# Patient Record
Sex: MALE | Race: WHITE | NOT HISPANIC OR LATINO | Employment: OTHER | URBAN - METROPOLITAN AREA
[De-identification: names, ages, dates, MRNs, and addresses within clinical notes are randomized per-mention and may not be internally consistent; named-entity substitution may affect disease eponyms.]

---

## 2024-10-21 ENCOUNTER — HOSPITAL ENCOUNTER (EMERGENCY)
Facility: HOSPITAL | Age: 54
Discharge: HOME | End: 2024-10-21
Attending: EMERGENCY MEDICINE
Payer: COMMERCIAL

## 2024-10-21 ENCOUNTER — CONSULT (OUTPATIENT)
Dept: GASTROENTEROLOGY | Facility: AMBULARY SURGERY CENTER | Age: 54
End: 2024-10-21
Payer: COMMERCIAL

## 2024-10-21 ENCOUNTER — HOSPITAL ENCOUNTER (INPATIENT)
Facility: HOSPITAL | Age: 54
LOS: 2 days | DRG: 254 | End: 2024-10-23
Attending: EMERGENCY MEDICINE | Admitting: INTERNAL MEDICINE
Payer: COMMERCIAL

## 2024-10-21 ENCOUNTER — APPOINTMENT (EMERGENCY)
Dept: CT IMAGING | Facility: HOSPITAL | Age: 54
DRG: 254 | End: 2024-10-21
Payer: COMMERCIAL

## 2024-10-21 VITALS
HEART RATE: 110 BPM | HEIGHT: 70 IN | OXYGEN SATURATION: 97 % | DIASTOLIC BLOOD PRESSURE: 68 MMHG | WEIGHT: 191.2 LBS | BODY MASS INDEX: 27.37 KG/M2 | SYSTOLIC BLOOD PRESSURE: 130 MMHG

## 2024-10-21 VITALS
DIASTOLIC BLOOD PRESSURE: 104 MMHG | HEART RATE: 106 BPM | RESPIRATION RATE: 18 BRPM | TEMPERATURE: 97.9 F | SYSTOLIC BLOOD PRESSURE: 146 MMHG | OXYGEN SATURATION: 96 %

## 2024-10-21 DIAGNOSIS — R79.89 ELEVATED SERUM CREATININE: ICD-10-CM

## 2024-10-21 DIAGNOSIS — K59.00 CONSTIPATION: Primary | ICD-10-CM

## 2024-10-21 DIAGNOSIS — K59.00 CONSTIPATION: ICD-10-CM

## 2024-10-21 DIAGNOSIS — K59.00 CONSTIPATION, UNSPECIFIED CONSTIPATION TYPE: Primary | ICD-10-CM

## 2024-10-21 DIAGNOSIS — E87.6 HYPOKALEMIA: ICD-10-CM

## 2024-10-21 PROBLEM — R65.10 SIRS (SYSTEMIC INFLAMMATORY RESPONSE SYNDROME) (HCC): Status: ACTIVE | Noted: 2024-10-21

## 2024-10-21 PROBLEM — R94.31 PROLONGED Q-T INTERVAL ON ECG: Status: ACTIVE | Noted: 2024-10-21

## 2024-10-21 PROBLEM — R11.2 NAUSEA AND VOMITING: Status: ACTIVE | Noted: 2024-10-21

## 2024-10-21 LAB
ALBUMIN SERPL BCG-MCNC: 4.9 G/DL (ref 3.5–5)
ALP SERPL-CCNC: 64 U/L (ref 34–104)
ALT SERPL W P-5'-P-CCNC: 13 U/L (ref 7–52)
ANION GAP SERPL CALCULATED.3IONS-SCNC: 14 MMOL/L (ref 4–13)
AST SERPL W P-5'-P-CCNC: 17 U/L (ref 13–39)
BASOPHILS # BLD AUTO: 0.06 THOUSANDS/ΜL (ref 0–0.1)
BASOPHILS NFR BLD AUTO: 0 % (ref 0–1)
BILIRUB SERPL-MCNC: 0.9 MG/DL (ref 0.2–1)
BUN SERPL-MCNC: 21 MG/DL (ref 5–25)
CALCIUM SERPL-MCNC: 9.9 MG/DL (ref 8.4–10.2)
CHLORIDE SERPL-SCNC: 99 MMOL/L (ref 96–108)
CO2 SERPL-SCNC: 26 MMOL/L (ref 21–32)
CREAT SERPL-MCNC: 1.55 MG/DL (ref 0.6–1.3)
EOSINOPHIL # BLD AUTO: 0.03 THOUSAND/ΜL (ref 0–0.61)
EOSINOPHIL NFR BLD AUTO: 0 % (ref 0–6)
ERYTHROCYTE [DISTWIDTH] IN BLOOD BY AUTOMATED COUNT: 12.9 % (ref 11.6–15.1)
GFR SERPL CREATININE-BSD FRML MDRD: 49 ML/MIN/1.73SQ M
GLUCOSE SERPL-MCNC: 128 MG/DL (ref 65–140)
HCT VFR BLD AUTO: 47.9 % (ref 36.5–49.3)
HGB BLD-MCNC: 16.6 G/DL (ref 12–17)
IMM GRANULOCYTES # BLD AUTO: 0.04 THOUSAND/UL (ref 0–0.2)
IMM GRANULOCYTES NFR BLD AUTO: 0 % (ref 0–2)
LYMPHOCYTES # BLD AUTO: 0.96 THOUSANDS/ΜL (ref 0.6–4.47)
LYMPHOCYTES NFR BLD AUTO: 6 % (ref 14–44)
MAGNESIUM SERPL-MCNC: 2.6 MG/DL (ref 1.9–2.7)
MCH RBC QN AUTO: 31 PG (ref 26.8–34.3)
MCHC RBC AUTO-ENTMCNC: 34.7 G/DL (ref 31.4–37.4)
MCV RBC AUTO: 90 FL (ref 82–98)
MONOCYTES # BLD AUTO: 1.04 THOUSAND/ΜL (ref 0.17–1.22)
MONOCYTES NFR BLD AUTO: 7 % (ref 4–12)
NEUTROPHILS # BLD AUTO: 12.79 THOUSANDS/ΜL (ref 1.85–7.62)
NEUTS SEG NFR BLD AUTO: 87 % (ref 43–75)
NRBC BLD AUTO-RTO: 0 /100 WBCS
PLATELET # BLD AUTO: 397 THOUSANDS/UL (ref 149–390)
PMV BLD AUTO: 9.3 FL (ref 8.9–12.7)
POTASSIUM SERPL-SCNC: 3.2 MMOL/L (ref 3.5–5.3)
PROCALCITONIN SERPL-MCNC: <0.05 NG/ML
PROT SERPL-MCNC: 8.4 G/DL (ref 6.4–8.4)
RBC # BLD AUTO: 5.35 MILLION/UL (ref 3.88–5.62)
SODIUM SERPL-SCNC: 139 MMOL/L (ref 135–147)
WBC # BLD AUTO: 14.92 THOUSAND/UL (ref 4.31–10.16)

## 2024-10-21 PROCEDURE — 84145 PROCALCITONIN (PCT): CPT | Performed by: NURSE PRACTITIONER

## 2024-10-21 PROCEDURE — 85025 COMPLETE CBC W/AUTO DIFF WBC: CPT

## 2024-10-21 PROCEDURE — 99283 EMERGENCY DEPT VISIT LOW MDM: CPT

## 2024-10-21 PROCEDURE — 99285 EMERGENCY DEPT VISIT HI MDM: CPT

## 2024-10-21 PROCEDURE — 36415 COLL VENOUS BLD VENIPUNCTURE: CPT

## 2024-10-21 PROCEDURE — 96361 HYDRATE IV INFUSION ADD-ON: CPT

## 2024-10-21 PROCEDURE — 93005 ELECTROCARDIOGRAM TRACING: CPT

## 2024-10-21 PROCEDURE — 96375 TX/PRO/DX INJ NEW DRUG ADDON: CPT

## 2024-10-21 PROCEDURE — 99283 EMERGENCY DEPT VISIT LOW MDM: CPT | Performed by: EMERGENCY MEDICINE

## 2024-10-21 PROCEDURE — 74177 CT ABD & PELVIS W/CONTRAST: CPT

## 2024-10-21 PROCEDURE — 96374 THER/PROPH/DIAG INJ IV PUSH: CPT

## 2024-10-21 PROCEDURE — 99203 OFFICE O/P NEW LOW 30 MIN: CPT | Performed by: PHYSICIAN ASSISTANT

## 2024-10-21 PROCEDURE — 80053 COMPREHEN METABOLIC PANEL: CPT

## 2024-10-21 PROCEDURE — 83735 ASSAY OF MAGNESIUM: CPT | Performed by: NURSE PRACTITIONER

## 2024-10-21 RX ORDER — POLYETHYLENE GLYCOL 3350 17 G/17G
17 POWDER, FOR SOLUTION ORAL DAILY
Qty: 12 EACH | Refills: 0 | Status: ON HOLD | OUTPATIENT
Start: 2024-10-21

## 2024-10-21 RX ORDER — POTASSIUM CHLORIDE 14.9 MG/ML
20 INJECTION INTRAVENOUS
Status: COMPLETED | OUTPATIENT
Start: 2024-10-21 | End: 2024-10-22

## 2024-10-21 RX ORDER — SODIUM CHLORIDE, SODIUM LACTATE, POTASSIUM CHLORIDE, CALCIUM CHLORIDE 600; 310; 30; 20 MG/100ML; MG/100ML; MG/100ML; MG/100ML
100 INJECTION, SOLUTION INTRAVENOUS CONTINUOUS
Status: DISCONTINUED | OUTPATIENT
Start: 2024-10-21 | End: 2024-10-23

## 2024-10-21 RX ORDER — POLYETHYLENE GLYCOL 3350 17 G/17G
17 POWDER, FOR SOLUTION ORAL ONCE
Status: DISCONTINUED | OUTPATIENT
Start: 2024-10-21 | End: 2024-10-21

## 2024-10-21 RX ORDER — FAMOTIDINE 10 MG/ML
20 INJECTION, SOLUTION INTRAVENOUS ONCE
Status: COMPLETED | OUTPATIENT
Start: 2024-10-21 | End: 2024-10-21

## 2024-10-21 RX ORDER — ONDANSETRON 2 MG/ML
4 INJECTION INTRAMUSCULAR; INTRAVENOUS ONCE
Status: COMPLETED | OUTPATIENT
Start: 2024-10-21 | End: 2024-10-21

## 2024-10-21 RX ORDER — BISACODYL 10 MG
10 SUPPOSITORY, RECTAL RECTAL DAILY PRN
Status: DISCONTINUED | OUTPATIENT
Start: 2024-10-21 | End: 2024-10-23 | Stop reason: HOSPADM

## 2024-10-21 RX ADMIN — POLYETHYLENE GLYCOL 3350, SODIUM SULFATE ANHYDROUS, SODIUM BICARBONATE, SODIUM CHLORIDE, POTASSIUM CHLORIDE 4000 ML: 236; 22.74; 6.74; 5.86; 2.97 POWDER, FOR SOLUTION ORAL at 06:04

## 2024-10-21 RX ADMIN — ONDANSETRON 4 MG: 2 INJECTION INTRAMUSCULAR; INTRAVENOUS at 19:47

## 2024-10-21 RX ADMIN — SODIUM CHLORIDE, SODIUM LACTATE, POTASSIUM CHLORIDE, AND CALCIUM CHLORIDE 100 ML/HR: .6; .31; .03; .02 INJECTION, SOLUTION INTRAVENOUS at 22:06

## 2024-10-21 RX ADMIN — SODIUM CHLORIDE 1000 ML: 0.9 INJECTION, SOLUTION INTRAVENOUS at 16:09

## 2024-10-21 RX ADMIN — MINERAL OIL 1 ENEMA: 100 ENEMA RECTAL at 22:30

## 2024-10-21 RX ADMIN — IOHEXOL 100 ML: 350 INJECTION, SOLUTION INTRAVENOUS at 17:12

## 2024-10-21 RX ADMIN — FAMOTIDINE 20 MG: 10 INJECTION INTRAVENOUS at 19:35

## 2024-10-21 RX ADMIN — POTASSIUM CHLORIDE 20 MEQ: 14.9 INJECTION, SOLUTION INTRAVENOUS at 23:21

## 2024-10-21 NOTE — ED ATTENDING ATTESTATION
10/21/2024  I, Kenan Poon DO, saw and evaluated the patient. I have discussed the patient with the resident/non-physician practitioner and agree with the resident's/non-physician practitioner's findings, Plan of Care, and MDM as documented in the resident's/non-physician practitioner's note, except where noted. All available labs and Radiology studies were reviewed.  I was present for key portions of any procedure(s) performed by the resident/non-physician practitioner and I was immediately available to provide assistance.       At this point I agree with the current assessment done in the Emergency Department.  I have conducted an independent evaluation of this patient a history and physical is as follows:    54-year-old male presents for constipation has not had a bowel movement for a week.  Patient has been on the road normally eats very healthy plant-based diet, however has been straining from the and thinks that is what is causing his constipation he has no past surgical history, denies any melena or blood in his stool.  Denies any vomiting.  No other modifying factors or associated symptoms.  Otherwise healthy.  On exam slightly tachycardic and hypertensive but seems nervous.  Nondistended abdomen.  No evidence of peritonitis.  Plan laxative discharge    ED Course         Critical Care Time  Procedures

## 2024-10-21 NOTE — PROGRESS NOTES
Ambulatory Visit  Name: Darin Bullock      : 1970      MRN: [id number]  Encounter Provider: Mitali Herrera PA-C  Encounter Date: 10/21/2024   Encounter department: [facility] [place]    Assessment & Plan  Constipation, unspecified constipation type  Patient went to the emergency room this morning for symptoms.  No labs or imaging performed.  He was sent home with GoLytely which he drank 1/2 around 10 AM.  He subsequently had large-volume emesis around 2 PM which he reports was all prep.  His abdomen is distended and firm in the office though bowel sounds are present.    -Discussed consideration for stat CT/obstruction series as an outpatient versus returning back to the emergency room.  His abdomen is distended and firm and he appears visibly uncomfortable in the office and tachycardic.  Recommend return to ER for evaluation.  He is agreeable  -ADT order placed.  -Recommend awaiting CT results prior to further bowel prep, enemas or manual disimpaction. He will likely require admission for this due to inability to tolerate prep as an outpatient.  -Recommend routine lab work with CBC, CMP and TSH    -He will require colonoscopy in the next few months.  He is aware.  We will await ER/hospital eval       History of Present Illness     Darin Bullock is a 54 y.o. male who presents with no significant past medical history after going to the emergency room this morning for constipation.    Patient reports for constipation with no significant bowel movement for at least 1 week.  He reports prior to this he was having regular bowel movements.  No blood in the stool or abdominal pain.  He reports he has been on the road recently there for eating a lot of fast food.  He then started some protein shakes and Metamucil and reports his constipation began to worsen.  He reports some occasional passage of gas however none significantly.  He tried Dulcolax last night without improvement as  "well.  He reports weight has been stable.    Patient went to the emergency room earlier this morning after no improvement with dulcolax overnight. No labs or imaging were obtained.  Patient was sent DOCUSYSly bowel prep and recommended to complete that.  He drink one half of it at 10 AM and subsequently had large episode of emesis around 2 PM.  He did not have any bowel movements with this.    No prior abdominal surgeries.    No significant GI family history.    No smoking or alcohol use.    No prior colonoscopy      Review of Systems   Constitutional:  Negative for chills and fever.   HENT:  Negative for ear pain and sore throat.    Eyes:  Negative for pain and visual disturbance.   Respiratory:  Negative for cough and shortness of breath.    Cardiovascular:  Negative for chest pain and palpitations.   Gastrointestinal:  Positive for abdominal pain. Negative for vomiting.   Genitourinary:  Negative for dysuria and hematuria.   Musculoskeletal:  Negative for arthralgias and back pain.   Skin:  Negative for color change and rash.   Neurological:  Negative for seizures and syncope.   All other systems reviewed and are negative.          Objective     /68 (BP Location: Left arm, Patient Position: Sitting, Cuff Size: Standard)   Pulse (!) 110   Ht 5' 10\" (1.778 m)   Wt 86.7 kg (191 lb 3.2 oz)   SpO2 97%   BMI 27.43 kg/m²     Physical Exam  Vitals reviewed.   Constitutional:       General: He is not in acute distress.     Appearance: Normal appearance. He is not ill-appearing.   HENT:      Head: Normocephalic and atraumatic.   Eyes:      Conjunctiva/sclera: Conjunctivae normal.   Cardiovascular:      Rate and Rhythm: Normal rate and regular rhythm.      Heart sounds: No murmur heard.  Pulmonary:      Effort: Pulmonary effort is normal. No respiratory distress.      Breath sounds: Normal breath sounds.   Abdominal:      General: Bowel sounds are normal. There is distension.      Tenderness: There is abdominal " tenderness. There is no guarding or rebound.      Comments: Abdomen is mildly distended and firm.  Mild tenderness throughout.  Bowel sounds are present   Musculoskeletal:         General: No swelling.      Cervical back: Normal range of motion.      Right lower leg: No edema.      Left lower leg: No edema.   Skin:     General: Skin is warm.      Coloration: Skin is not jaundiced.   Neurological:      General: No focal deficit present.      Mental Status: He is alert and oriented to person, place, and time. Mental status is at baseline.   Psychiatric:         Mood and Affect: Mood normal.         Behavior: Behavior normal.

## 2024-10-21 NOTE — ED PROVIDER NOTES
Time reflects when diagnosis was documented in both MDM as applicable and the Disposition within this note       Time User Action Codes Description Comment    10/21/2024  5:48 AM Darin Kapoor Add [K59.00] Constipation           ED Disposition       ED Disposition   Discharge    Condition   Stable    Date/Time   Mon Oct 21, 2024  5:48 AM    Comment   Darin BURNS Ara discharge to home/self care.                   Assessment & Plan       Medical Decision Making  Patient is a 54-year-old male presenting for concerns of constipation.  DDx: Constipation  Based on patient presentation and physical exam findings, primary concern for constipation.  Plan for GoLytely administration, MiraLAX prescription, and GI follow-up.  GoLytely provided, patient will plan to take it at home.  Return precautions given.  Patient understands agrees with plan.  Ready for discharge.    Problems Addressed:  Constipation: acute illness or injury    Risk  OTC drugs.  Prescription drug management.             Medications   polyethylene glycol (GOLYTELY) bowel prep 4,000 mL (4,000 mL Oral Given 10/21/24 0604)       ED Risk Strat Scores                           SBIRT 20yo+      Flowsheet Row Most Recent Value   Initial Alcohol Screen: US AUDIT-C     1. How often do you have a drink containing alcohol? 0 Filed at: 10/21/2024 0459   Audit-C Score 0 Filed at: 10/21/2024 0459   CHRISS: How many times in the past year have you...    Used an illegal drug or used a prescription medication for non-medical reasons? Never Filed at: 10/21/2024 0459                            History of Present Illness       Chief Complaint   Patient presents with    Constipation     Pt c/o constipation, states he has not had a bm in one week.  Pt very uncomfortable and bloated.  Pt states he tried colace at home with no relief.        History reviewed. No pertinent past medical history.   History reviewed. No pertinent surgical history.   History reviewed. No pertinent family  history.   Social History     Tobacco Use    Smoking status: Never     Passive exposure: Never    Smokeless tobacco: Never   Substance Use Topics    Alcohol use: Not Currently    Drug use: Never      E-Cigarette/Vaping      E-Cigarette/Vaping Substances      I have reviewed and agree with the history as documented.     HPI  Patient is a 54-year-old male presenting for concerns of constipation has not had a bowel movement for the last week.  No abdominal surgical history, no symptom past medical history.  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  States since that time has had difficulty with a bowel movement.  No fever chills no chest pain.  Has tried Dulcolax at home without relief.  Review of Systems   Constitutional: Negative.    HENT: Negative.     Eyes: Negative.    Respiratory: Negative.     Cardiovascular: Negative.    Gastrointestinal:  Positive for constipation.   Endocrine: Negative.    Genitourinary: Negative.    Musculoskeletal: Negative.    Allergic/Immunologic: Negative.    Neurological: Negative.    Hematological: Negative.    Psychiatric/Behavioral: Negative.             Objective       ED Triage Vitals [10/21/24 0458]   Temperature Pulse Blood Pressure Respirations SpO2 Patient Position - Orthostatic VS   97.9 °F (36.6 °C) (!) 106 (!) 146/104 18 96 % Sitting      Temp Source Heart Rate Source BP Location FiO2 (%) Pain Score    Temporal Monitor Left arm -- --      Vitals      Date and Time Temp Pulse SpO2 Resp BP Pain Score FACES Pain Rating User   10/21/24 0458 97.9 °F (36.6 °C) 106 96 % 18 146/104 -- -- NZ            Physical Exam  Vitals and nursing note reviewed.   Constitutional:       Appearance: Normal appearance. He is normal weight.   HENT:      Head: Normocephalic and atraumatic.      Right Ear: Tympanic membrane, ear canal and external ear normal.      Left Ear: Tympanic membrane, ear canal and external ear normal.      Nose:  Nose normal.      Mouth/Throat:      Mouth: Mucous membranes are moist.      Pharynx: Oropharynx is clear.   Eyes:      Extraocular Movements: Extraocular movements intact.      Conjunctiva/sclera: Conjunctivae normal.      Pupils: Pupils are equal, round, and reactive to light.   Cardiovascular:      Rate and Rhythm: Normal rate and regular rhythm.      Pulses: Normal pulses.      Heart sounds: Normal heart sounds.   Pulmonary:      Effort: Pulmonary effort is normal.      Breath sounds: Normal breath sounds.   Abdominal:      General: Abdomen is flat. Bowel sounds are normal.      Palpations: Abdomen is soft.   Musculoskeletal:         General: Normal range of motion.      Cervical back: Normal range of motion and neck supple.   Skin:     General: Skin is warm and dry.      Capillary Refill: Capillary refill takes less than 2 seconds.   Neurological:      General: No focal deficit present.      Mental Status: He is alert and oriented to person, place, and time.   Psychiatric:         Mood and Affect: Mood normal.         Behavior: Behavior normal.         Thought Content: Thought content normal.         Judgment: Judgment normal.         Results Reviewed       None            No orders to display       Procedures    ED Medication and Procedure Management   None     Discharge Medication List as of 10/21/2024  6:03 AM        START taking these medications    Details   polyethylene glycol (MIRALAX) 17 g packet Take 17 g by mouth daily, Starting Mon 10/21/2024, Normal             ED SEPSIS DOCUMENTATION   Time reflects when diagnosis was documented in both MDM as applicable and the Disposition within this note       Time User Action Codes Description Comment    10/21/2024  5:48 AM Darin Kapoor Add [K59.00] Constipation                  Darin Kapoor MD  10/21/24 0708

## 2024-10-21 NOTE — Clinical Note
Case was discussed with SHILOH and the patient's admission status was agreed to be Admission Status: observation status to the service of Dr. Mendoza .

## 2024-10-21 NOTE — ED PROVIDER NOTES
Time reflects when diagnosis was documented in both MDM as applicable and the Disposition within this note       Time User Action Codes Description Comment    10/21/2024  7:11 PM Cherie Donald [K59.00] Constipation     10/21/2024  7:11 PM Cherie Donald [E87.6] Hypokalemia     10/21/2024  8:24 PM Cherie Donald [R79.89] Elevated serum creatinine           ED Disposition       ED Disposition   Admit    Condition   Stable    Date/Time   Mon Oct 21, 2024  8:51 PM    Comment   Case was discussed with SHILOH and the patient's admission status was agreed to be Admission Status: inpatient status to the service of Dr. Mendoza .               Assessment & Plan       Medical Decision Making  Differential diagnosis to include but not limited to: bowel obstruction, bowel perforation, constipation  CBC with leukocytosis, CMP with hypokalemia and elevated creatinine and anion gap. CT abdomen/pelvis with severe constipation. Distended fluid-filled loops of small bowel likely secondary to constipation however enteritis and/or low-grade obstruction.  Discussed case with colorectal on call, Dr. Garcia for evaluation due to high suspicion for obstruction. Patient vomiting again prior to evaluation, Zofran ordered. Discussed case with SHILOH Mendoza and Miki Perdomo for admission. Patient accepted. Pt stable at time of admission, vital signs reviewed, questions answered.     Problems Addressed:  Constipation: acute illness or injury  Hypokalemia: acute illness or injury    Amount and/or Complexity of Data Reviewed  External Data Reviewed: notes.     Details: Reviewed patient's ED visit earlier today and his GI appointment.  Labs: ordered. Decision-making details documented in ED Course.  Radiology: ordered. Decision-making details documented in ED Course.  Discussion of management or test interpretation with external provider(s): Reviewed case with Dr. Garcia from colorectal surgery due to high suspicion for  obstruction. Their plan is medicine admit, recommend suppository and enemas, monitoring abdominal exam, if does not resolve in the next 48 hours would engage GI. Will need follow-up colonoscopy.   Discussed case with [facility] provider iMki Perdomo who accepted patient for admission.    Risk  Prescription drug management.  Decision regarding hospitalization.        ED Course as of 10/21/24 2052   Mon Oct 21, 2024   1634 WBC(!): 14.92   1705 Potassium(!): 3.2   1705 ANION GAP(!): 14   1705 Creatinine(!): 1.55   1903 CT abdomen pelvis with contrast  Severe constipation. Distended fluid-filled loops of small bowel likely secondary to constipation however enteritis and/or low-grade obstruction not excluded       Medications   sodium chloride 0.9 % bolus 1,000 mL (0 mL Intravenous Stopped 10/21/24 1829)   iohexol (OMNIPAQUE) 350 MG/ML injection (MULTI-DOSE) 100 mL (100 mL Intravenous Given 10/21/24 1712)   Famotidine (PF) (PEPCID) injection 20 mg (20 mg Intravenous Given 10/21/24 1935)   ondansetron (ZOFRAN) injection 4 mg (4 mg Intravenous Given 10/21/24 1947)       ED Risk Strat Scores                                               History of Present Illness       Chief Complaint   Patient presents with    Constipation     Pt reports he has constipation was seen at [facility] this am and given go-lyte with no results but then vomited it up. Pt was seen at gastro and told he should have imaging.        History reviewed. No pertinent past medical history.   History reviewed. No pertinent surgical history.   History reviewed. No pertinent family history.   Social History     Tobacco Use    Smoking status: Never     Passive exposure: Never    Smokeless tobacco: Never   Substance Use Topics    Alcohol use: Not Currently    Drug use: Never      E-Cigarette/Vaping      E-Cigarette/Vaping Substances      I have reviewed and agree with the history as documented.     Darin is a 54 year old male presenting to the ED for constipation x  one week. 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 In the beginning of October (this month) he began eating healthy again, eating Core Powder shakes for breakfast and taking Metamucil. He stopped taking Metamucil 1.5-2 weeks ago. He noted constipation developing about two weeks ago, his last bowel movement was eight days ago with passage of small stool pellets five days ago. Nothing since. He went to [facility] to get Ducolax, the pharmacist recommended ER evaluation if no BM this morning. Was evaluated in [place] and given a new medication for constipation. Also made a GI appointment for this afternoon. Drank 56 oz of the medication and vomited it up at 1330 before the GI doctor. GI dr recommended ED visit for imaging and possible admission. States he has lack of appetite and feels his abdomen is distended. Able to tolerate water. No further symptoms. No hx of constipation or abdominal surgeries.         Review of Systems   Constitutional:  Positive for appetite change. Negative for chills and fever.   Eyes:  Negative for photophobia and visual disturbance.   Respiratory:  Negative for cough and shortness of breath.    Cardiovascular:  Negative for chest pain and palpitations.   Gastrointestinal:  Positive for abdominal pain and constipation. Negative for diarrhea, nausea and vomiting.   Genitourinary:  Negative for decreased urine volume, difficulty urinating, dysuria, frequency, hematuria and urgency.   Musculoskeletal:  Negative for myalgias, neck pain and neck stiffness.   Skin:  Negative for rash.   Neurological:  Negative for light-headedness and headaches.           Objective       ED Triage Vitals   Temperature Pulse Blood Pressure Respirations SpO2 Patient Position - Orthostatic VS   10/21/24 1508 10/21/24 1507 10/21/24 1507 10/21/24 1507 10/21/24 1507 10/21/24 1507   (!) 97.4 °F (36.3 °C) 100 142/87 18  95 % Sitting      Temp Source Heart Rate Source BP Location FiO2 (%) Pain Score    10/21/24 1508 10/21/24 1830 10/21/24 1507 -- 10/21/24 1939    Oral Monitor Right arm  No Pain      Vitals      Date and Time Temp Pulse SpO2 Resp BP Pain Score FACES Pain Rating User   10/21/24 1939 -- 96 94 % 18 158/95 No Pain -- GELY   10/21/24 1900 -- 98 95 % 18 150/100 -- -- MD   10/21/24 1830 -- 98 95 % 18 179/107 -- -- KK   10/21/24 1600 -- 98 95 % 18 153/91 -- -- MD   10/21/24 1545 -- 98 95 % 18 158/90 -- -- MD   10/21/24 1508 97.4 °F (36.3 °C) -- -- -- -- -- -- RLN   10/21/24 1507 -- 100 95 % 18 142/87 -- -- RLN            Physical Exam  Vitals reviewed.   Constitutional:       General: He is not in acute distress.     Appearance: Normal appearance. He is not ill-appearing or toxic-appearing.   HENT:      Head: Normocephalic and atraumatic.      Right Ear: External ear normal.      Left Ear: External ear normal.      Nose: Nose normal.   Eyes:      General: No scleral icterus.        Right eye: No discharge.         Left eye: No discharge.      Extraocular Movements: Extraocular movements intact.      Conjunctiva/sclera: Conjunctivae normal.   Cardiovascular:      Rate and Rhythm: Normal rate and regular rhythm.      Pulses: Normal pulses.      Heart sounds: Normal heart sounds.   Pulmonary:      Effort: Pulmonary effort is normal. No respiratory distress.      Breath sounds: Normal breath sounds.   Abdominal:      General: There is distension.      Tenderness: There is abdominal tenderness (generalized). There is no guarding or rebound.      Comments: Firm, distended abdomen.   Musculoskeletal:         General: Normal range of motion.      Cervical back: Normal range of motion and neck supple. No rigidity or tenderness.   Lymphadenopathy:      Cervical: No cervical adenopathy.   Skin:     General: Skin is warm and dry.      Capillary Refill: Capillary refill takes less than 2 seconds.   Neurological:      Mental Status: He is  alert.   Psychiatric:         Mood and Affect: Mood normal.         Behavior: Behavior normal.         Results Reviewed       Procedure Component Value Units Date/Time    Comprehensive metabolic panel [[id number]]  (Abnormal) Collected: 10/21/24 1610    Lab Status: Final result Specimen: Blood from Arm, Right Updated: 10/21/24 1645     Sodium 139 mmol/L      Potassium 3.2 mmol/L      Chloride 99 mmol/L      CO2 26 mmol/L      ANION GAP 14 mmol/L      BUN 21 mg/dL      Creatinine 1.55 mg/dL      Glucose 128 mg/dL      Calcium 9.9 mg/dL      AST 17 U/L      ALT 13 U/L      Alkaline Phosphatase 64 U/L      Total Protein 8.4 g/dL      Albumin 4.9 g/dL      Total Bilirubin 0.90 mg/dL      eGFR 49 ml/min/1.73sq m     Narrative:      National Kidney Disease Foundation guidelines for Chronic Kidney Disease (CKD):     Stage 1 with normal or high GFR (GFR > 90 mL/min/1.73 square meters)    Stage 2 Mild CKD (GFR = 60-89 mL/min/1.73 square meters)    Stage 3A Moderate CKD (GFR = 45-59 mL/min/1.73 square meters)    Stage 3B Moderate CKD (GFR = 30-44 mL/min/1.73 square meters)    Stage 4 Severe CKD (GFR = 15-29 mL/min/1.73 square meters)    Stage 5 End Stage CKD (GFR <15 mL/min/1.73 square meters)  Note: GFR calculation is accurate only with a steady state creatinine    CBC and differential [[id number]]  (Abnormal) Collected: 10/21/24 1610    Lab Status: Final result Specimen: Blood from Arm, Right Updated: 10/21/24 1620     WBC 14.92 Thousand/uL      RBC 5.35 Million/uL      Hemoglobin 16.6 g/dL      Hematocrit 47.9 %      MCV 90 fL      MCH 31.0 pg      MCHC 34.7 g/dL      RDW 12.9 %      MPV 9.3 fL      Platelets 397 Thousands/uL      nRBC 0 /100 WBCs      Segmented % 87 %      Immature Grans % 0 %      Lymphocytes % 6 %      Monocytes % 7 %      Eosinophils Relative 0 %      Basophils Relative 0 %      Absolute Neutrophils 12.79 Thousands/µL      Absolute Immature Grans 0.04 Thousand/uL      Absolute Lymphocytes 0.96  Thousands/µL      Absolute Monocytes 1.04 Thousand/µL      Eosinophils Absolute 0.03 Thousand/µL      Basophils Absolute 0.06 Thousands/µL             CT abdomen pelvis with contrast   Final Interpretation by Louis Solis MD (10/21 1900)      Severe constipation. Distended fluid-filled loops of small bowel likely secondary to constipation however enteritis and/or low-grade obstruction not excluded         Workstation performed: [id number]             Procedures    ED Medication and Procedure Management   Prior to Admission Medications   Prescriptions Last Dose Informant Patient Reported? Taking?   polyethylene glycol (MIRALAX) 17 g packet  Self No No   Sig: Take 17 g by mouth daily      Facility-Administered Medications: None     Patient's Medications   Discharge Prescriptions    No medications on file     No discharge procedures on file.  ED SEPSIS DOCUMENTATION   Time reflects when diagnosis was documented in both MDM as applicable and the Disposition within this note       Time User Action Codes Description Comment    10/21/2024  7:11 PM Cherie Donald [K59.00] Constipation     10/21/2024  7:11 PM Cherie Donald [E87.6] Hypokalemia     10/21/2024  8:24 PM Cherie Donald [R79.89] Elevated serum creatinine            Initial Sepsis Screening       Row Name 10/21/24 1634                Is the patient's history suggestive of a new or worsening infection? No  -AB                  User Key  (r) = Recorded By, (t) = Taken By, (c) = Cosigned By      Initials Name Provider Type    AB Cherie Donald PA-C Physician Assistant                       Cherie Donald PA-C  10/21/24 2052

## 2024-10-22 LAB
ANION GAP SERPL CALCULATED.3IONS-SCNC: 15 MMOL/L (ref 4–13)
ATRIAL RATE: 88 BPM
ATRIAL RATE: 98 BPM
B-OH-BUTYR SERPL-MCNC: 1.64 MMOL/L (ref 0.02–0.27)
BACTERIA UR QL AUTO: ABNORMAL /HPF
BASE EX.OXY STD BLDV CALC-SCNC: 88.6 % (ref 60–80)
BASE EXCESS BLDV CALC-SCNC: -2.3 MMOL/L
BASOPHILS # BLD AUTO: 0.02 THOUSANDS/ΜL (ref 0–0.1)
BASOPHILS NFR BLD AUTO: 0 % (ref 0–1)
BILIRUB UR QL STRIP: NEGATIVE
BUN SERPL-MCNC: 25 MG/DL (ref 5–25)
CALCIUM SERPL-MCNC: 8.7 MG/DL (ref 8.4–10.2)
CHLORIDE SERPL-SCNC: 105 MMOL/L (ref 96–108)
CLARITY UR: CLEAR
CO2 SERPL-SCNC: 18 MMOL/L (ref 21–32)
COLOR UR: YELLOW
CREAT SERPL-MCNC: 1.31 MG/DL (ref 0.6–1.3)
EOSINOPHIL # BLD AUTO: 0 THOUSAND/ΜL (ref 0–0.61)
EOSINOPHIL NFR BLD AUTO: 0 % (ref 0–6)
ERYTHROCYTE [DISTWIDTH] IN BLOOD BY AUTOMATED COUNT: 12.8 % (ref 11.6–15.1)
GFR SERPL CREATININE-BSD FRML MDRD: 60 ML/MIN/1.73SQ M
GLUCOSE SERPL-MCNC: 122 MG/DL (ref 65–140)
GLUCOSE UR STRIP-MCNC: NEGATIVE MG/DL
HCO3 BLDV-SCNC: 22.1 MMOL/L (ref 24–30)
HCT VFR BLD AUTO: 44 % (ref 36.5–49.3)
HGB BLD-MCNC: 14.9 G/DL (ref 12–17)
HGB UR QL STRIP.AUTO: NEGATIVE
IMM GRANULOCYTES # BLD AUTO: 0.04 THOUSAND/UL (ref 0–0.2)
IMM GRANULOCYTES NFR BLD AUTO: 0 % (ref 0–2)
KETONES UR STRIP-MCNC: ABNORMAL MG/DL
LACTATE SERPL-SCNC: 1.1 MMOL/L (ref 0.5–2)
LEUKOCYTE ESTERASE UR QL STRIP: NEGATIVE
LYMPHOCYTES # BLD AUTO: 0.95 THOUSANDS/ΜL (ref 0.6–4.47)
LYMPHOCYTES NFR BLD AUTO: 9 % (ref 14–44)
MAGNESIUM SERPL-MCNC: 2.4 MG/DL (ref 1.9–2.7)
MCH RBC QN AUTO: 30.7 PG (ref 26.8–34.3)
MCHC RBC AUTO-ENTMCNC: 33.9 G/DL (ref 31.4–37.4)
MCV RBC AUTO: 91 FL (ref 82–98)
MONOCYTES # BLD AUTO: 1.24 THOUSAND/ΜL (ref 0.17–1.22)
MONOCYTES NFR BLD AUTO: 12 % (ref 4–12)
MUCOUS THREADS UR QL AUTO: ABNORMAL
NEUTROPHILS # BLD AUTO: 8.34 THOUSANDS/ΜL (ref 1.85–7.62)
NEUTS SEG NFR BLD AUTO: 79 % (ref 43–75)
NITRITE UR QL STRIP: NEGATIVE
NON-SQ EPI CELLS URNS QL MICRO: ABNORMAL /HPF
NRBC BLD AUTO-RTO: 0 /100 WBCS
O2 CT BLDV-SCNC: 19.3 ML/DL
P AXIS: 15 DEGREES
P AXIS: 30 DEGREES
PCO2 BLDV: 37.5 MM HG (ref 42–50)
PH BLDV: 7.39 [PH] (ref 7.3–7.4)
PH UR STRIP.AUTO: 5.5 [PH]
PLATELET # BLD AUTO: 347 THOUSANDS/UL (ref 149–390)
PMV BLD AUTO: 9.2 FL (ref 8.9–12.7)
PO2 BLDV: 60.9 MM HG (ref 35–45)
POTASSIUM SERPL-SCNC: 3.5 MMOL/L (ref 3.5–5.3)
PR INTERVAL: 166 MS
PR INTERVAL: 166 MS
PROCALCITONIN SERPL-MCNC: 0.07 NG/ML
PROT UR STRIP-MCNC: ABNORMAL MG/DL
QRS AXIS: -25 DEGREES
QRS AXIS: -28 DEGREES
QRSD INTERVAL: 86 MS
QRSD INTERVAL: 96 MS
QT INTERVAL: 418 MS
QT INTERVAL: 548 MS
QTC INTERVAL: 505 MS
QTC INTERVAL: 699 MS
RBC # BLD AUTO: 4.85 MILLION/UL (ref 3.88–5.62)
RBC #/AREA URNS AUTO: ABNORMAL /HPF
SODIUM SERPL-SCNC: 138 MMOL/L (ref 135–147)
SP GR UR STRIP.AUTO: >=1.05 (ref 1–1.03)
T WAVE AXIS: -12 DEGREES
T WAVE AXIS: -34 DEGREES
TSH SERPL DL<=0.05 MIU/L-ACNC: 0.76 UIU/ML (ref 0.45–4.5)
UROBILINOGEN UR STRIP-ACNC: <2 MG/DL
VENTRICULAR RATE: 88 BPM
VENTRICULAR RATE: 98 BPM
WBC # BLD AUTO: 10.59 THOUSAND/UL (ref 4.31–10.16)
WBC #/AREA URNS AUTO: ABNORMAL /HPF

## 2024-10-22 PROCEDURE — 93010 ELECTROCARDIOGRAM REPORT: CPT | Performed by: INTERNAL MEDICINE

## 2024-10-22 PROCEDURE — 83605 ASSAY OF LACTIC ACID: CPT

## 2024-10-22 PROCEDURE — 82805 BLOOD GASES W/O2 SATURATION: CPT

## 2024-10-22 PROCEDURE — 82010 KETONE BODYS QUAN: CPT

## 2024-10-22 PROCEDURE — 99232 SBSQ HOSP IP/OBS MODERATE 35: CPT | Performed by: INTERNAL MEDICINE

## 2024-10-22 PROCEDURE — 83735 ASSAY OF MAGNESIUM: CPT

## 2024-10-22 PROCEDURE — 93005 ELECTROCARDIOGRAM TRACING: CPT

## 2024-10-22 PROCEDURE — 80048 BASIC METABOLIC PNL TOTAL CA: CPT | Performed by: NURSE PRACTITIONER

## 2024-10-22 PROCEDURE — 84145 PROCALCITONIN (PCT): CPT | Performed by: NURSE PRACTITIONER

## 2024-10-22 PROCEDURE — 84443 ASSAY THYROID STIM HORMONE: CPT

## 2024-10-22 PROCEDURE — 85025 COMPLETE CBC W/AUTO DIFF WBC: CPT | Performed by: NURSE PRACTITIONER

## 2024-10-22 PROCEDURE — 81001 URINALYSIS AUTO W/SCOPE: CPT | Performed by: NURSE PRACTITIONER

## 2024-10-22 PROCEDURE — NC001 PR NO CHARGE: Performed by: COLON & RECTAL SURGERY

## 2024-10-22 PROCEDURE — 99254 IP/OBS CNSLTJ NEW/EST MOD 60: CPT | Performed by: COLON & RECTAL SURGERY

## 2024-10-22 RX ORDER — PANTOPRAZOLE SODIUM 40 MG/1
40 TABLET, DELAYED RELEASE ORAL
Status: DISCONTINUED | OUTPATIENT
Start: 2024-10-22 | End: 2024-10-23 | Stop reason: HOSPADM

## 2024-10-22 RX ORDER — MAGNESIUM HYDROXIDE/ALUMINUM HYDROXICE/SIMETHICONE 120; 1200; 1200 MG/30ML; MG/30ML; MG/30ML
30 SUSPENSION ORAL EVERY 4 HOURS PRN
Status: DISCONTINUED | OUTPATIENT
Start: 2024-10-22 | End: 2024-10-23 | Stop reason: HOSPADM

## 2024-10-22 RX ORDER — HYDRALAZINE HYDROCHLORIDE 20 MG/ML
10 INJECTION INTRAMUSCULAR; INTRAVENOUS EVERY 8 HOURS PRN
Status: DISCONTINUED | OUTPATIENT
Start: 2024-10-22 | End: 2024-10-23 | Stop reason: HOSPADM

## 2024-10-22 RX ADMIN — MINERAL OIL 1 ENEMA: 100 ENEMA RECTAL at 09:24

## 2024-10-22 RX ADMIN — BISACODYL 10 MG: 10 SUPPOSITORY RECTAL at 21:24

## 2024-10-22 RX ADMIN — SODIUM CHLORIDE, SODIUM LACTATE, POTASSIUM CHLORIDE, AND CALCIUM CHLORIDE 100 ML/HR: .6; .31; .03; .02 INJECTION, SOLUTION INTRAVENOUS at 23:52

## 2024-10-22 RX ADMIN — ALUMINUM HYDROXIDE, MAGNESIUM HYDROXIDE, AND DIMETHICONE 30 ML: 200; 20; 200 SUSPENSION ORAL at 18:48

## 2024-10-22 RX ADMIN — PANTOPRAZOLE SODIUM 40 MG: 40 TABLET, DELAYED RELEASE ORAL at 10:58

## 2024-10-22 RX ADMIN — SODIUM CHLORIDE, SODIUM LACTATE, POTASSIUM CHLORIDE, AND CALCIUM CHLORIDE 100 ML/HR: .6; .31; .03; .02 INJECTION, SOLUTION INTRAVENOUS at 14:28

## 2024-10-22 RX ADMIN — POTASSIUM CHLORIDE 20 MEQ: 14.9 INJECTION, SOLUTION INTRAVENOUS at 01:43

## 2024-10-22 RX ADMIN — BISACODYL 10 MG: 10 SUPPOSITORY RECTAL at 03:55

## 2024-10-22 NOTE — ASSESSMENT & PLAN NOTE
Patient denies fever, chills, cough, shortness of breath, chest pain, dysuria.SIRS criteria: Leukocytosis, tachycardia  Suspect reactive due to recent nausea/vomiting 2/2 constipation  Monitor off antibiotics.  Monitor vital signs, laboratory studies.  Lactic acid unremarkable  Beta-hydroxybutyrate elevated to 1.64

## 2024-10-22 NOTE — PLAN OF CARE
Problem: PAIN - ADULT  Goal: Verbalizes/displays adequate comfort level or baseline comfort level  Description: Interventions:  - Encourage patient to monitor pain and request assistance  - Assess pain using appropriate pain scale  - Administer analgesics based on type and severity of pain and evaluate response  - Implement non-pharmacological measures as appropriate and evaluate response  - Consider cultural and social influences on pain and pain management  - Notify physician/advanced practitioner if interventions unsuccessful or patient reports new pain  Outcome: Progressing     Problem: INFECTION - ADULT  Goal: Absence or prevention of progression during hospitalization  Description: INTERVENTIONS:  - Assess and monitor for signs and symptoms of infection  - Monitor lab/diagnostic results  - Monitor all insertion sites, i.e. indwelling lines, tubes, and drains  - Monitor endotracheal if appropriate and nasal secretions for changes in amount and color  - [place] appropriate cooling/warming therapies per order  - Administer medications as ordered  - Instruct and encourage patient and family to use good hand hygiene technique  - Identify and instruct in appropriate isolation precautions for identified infection/condition  Outcome: Progressing  Goal: Absence of fever/infection during neutropenic period  Description: INTERVENTIONS:  - Monitor WBC    Outcome: Progressing     Problem: SAFETY ADULT  Goal: Patient will remain free of falls  Description: INTERVENTIONS:  - Educate patient/family on patient safety including physical limitations  - Instruct patient to call for assistance with activity   - Consult OT/PT to assist with strengthening/mobility   - Keep Call bell within reach  - Keep bed low and locked with side rails adjusted as appropriate  - Keep care items and personal belongings within reach  - Initiate and maintain comfort rounds  - Make Fall Risk Sign visible to staff  - Offer Toileting every  Hours,  in advance of need  - Initiate/Maintain alarm  - Obtain necessary fall risk management equipment:   - Apply yellow socks and bracelet for high fall risk patients  - Consider moving patient to room near nurses station  Outcome: Progressing  Goal: Maintain or return to baseline ADL function  Description: INTERVENTIONS:  -  Assess patient's ability to carry out ADLs; assess patient's baseline for ADL function and identify physical deficits which impact ability to perform ADLs (bathing, care of mouth/teeth, toileting, grooming, dressing, etc.)  - Assess/evaluate cause of self-care deficits   - Assess range of motion  - Assess patient's mobility; develop plan if impaired  - Assess patient's need for assistive devices and provide as appropriate  - Encourage maximum independence but intervene and supervise when necessary  - Involve family in performance of ADLs  - Assess for home care needs following discharge   - Consider OT consult to assist with ADL evaluation and planning for discharge  - Provide patient education as appropriate  Outcome: Progressing  Goal: Maintains/Returns to pre admission functional level  Description: INTERVENTIONS:  - Perform AM-PAC 6 Click Basic Mobility/ Daily Activity assessment daily.  - Set and communicate daily mobility goal to care team and patient/family/caregiver.   - Collaborate with rehabilitation services on mobility goals if consulted  - Perform Range of Motion  times a day.  - Reposition patient every  hours.  - Dangle patient  times a day  - Stand patient  times a day  - Ambulate patient  times a day  - Out of bed to chair  times a day   - Out of bed for meals  times a day  - Out of bed for toileting  - Record patient progress and toleration of activity level   Outcome: Progressing     Problem: DISCHARGE PLANNING  Goal: Discharge to home or other facility with appropriate resources  Description: INTERVENTIONS:  - Identify barriers to discharge w/patient and caregiver  - Arrange for  needed discharge resources and transportation as appropriate  - Identify discharge learning needs (meds, wound care, etc.)  - Arrange for interpretive services to assist at discharge as needed  - Refer to Case Management Department for coordinating discharge planning if the patient needs post-hospital services based on physician/advanced practitioner order or complex needs related to functional status, cognitive ability, or social support system  Outcome: Progressing     Problem: Knowledge Deficit  Goal: Patient/family/caregiver demonstrates understanding of disease process, treatment plan, medications, and discharge instructions  Description: Complete learning assessment and assess knowledge base.  Interventions:  - Provide teaching at level of understanding  - Provide teaching via preferred learning methods  Outcome: Progressing

## 2024-10-22 NOTE — PROGRESS NOTES
"Progress Note - Hospitalist   Name: Darin Bullock 54 y.o. male I MRN: [id number]  Unit/Bed#: W -01 I Date of Admission: 10/21/2024   Date of Service: 10/22/2024 I Hospital Day: 1    Assessment & Plan  Constipation  Presentation: Patient presents to the ED for the second time today due to complaints of constipation for 1 week. Patient was seen in [facility] ED this morning at which time he was discharged home with GoLytely, Miralax prescription and GI outpatient follow up. He returned to [facility] ED due to new onset of nausea and vomiting following consumption 58 ounces of GoLytely. Patient denies ever undergoing a colonoscopy in the past.  CT A/P: \"Severe constipation. Distended fluid-filled loops of small bowel likely secondary to constipation however enteritis and/or low-grade obstruction not excluded \"  Colorectal surgery consulted  Monitor abdominal exam.  Suppositories and enemas.  Clear liquids as tolerated.  If no return of bowel function in 48 hours, recommending GI consult.  Stool record at bedside.  SIRS (systemic inflammatory response syndrome) (HCC)  Patient denies fever, chills, cough, shortness of breath, chest pain, dysuria.SIRS criteria: Leukocytosis, tachycardia  Suspect reactive due to recent nausea/vomiting 2/2 constipation  Monitor off antibiotics.  Monitor vital signs, laboratory studies.  Lactic acid unremarkable  Beta-hydroxybutyrate elevated to 1.64  Elevated serum creatinine  Creatinine upon admission: 1.55  Baseline: Unsure of baseline creatinine due to limited lab records  IV hydration.  Monitor BMP.  Nausea and vomiting  Suspect 2/2 constipation.  IM Tigan.  IV hydration.  Clear liquids as tolerated.  Prolonged Q-T interval on ECG  Patient denies chest pain.  EKG displayed NSR, nonspecific T wave abnormality, heart rate 98, QTc 699.  Replete electrolytes.  Repeat EKG demonstrated Qtc prolonged at 505, but improved from 699 on admission.    VTE Pharmacologic Prophylaxis: VTE Score: 2 Low Risk " (Score 0-2) - Encourage Ambulation.    Mobility:   Basic Mobility Inpatient Raw Score: 24  JH-HLM Goal: 8: Walk 250 feet or more  JH-HLM Achieved: 8: Walk 250 feet ot more  JH-HLM Goal achieved. Continue to encourage appropriate mobility.    Patient Centered Rounds: I performed bedside rounds with nursing staff today.   Discussions with Specialists or Other Care Team Provider: Yes    Education and Discussions with Family / Patient: Patient declined call to .     Current Length of Stay: 1 day(s)  Current Patient Status: Inpatient   Certification Statement: The patient will continue to require additional inpatient hospital stay due to severe constipation and dilated bowel on CT  Discharge Plan: Anticipate discharge in 48-72 hrs to home.    Code Status: Level 1 - Full Code    Subjective   Patient seen and passed some flatus after enema last evening.  Around noon patient had very small bowel movement, not feeling bloated.  Patient had 1 episode of vomiting after GoLytely PTA, denies nausea since.  Patient has no surgical history and denies family history of colon polyps or mass.  He denies diverticulitis history.  He denies fever/chills, nausea/vomiting, cough, shortness of breath, chest pain, abdominal pain, changes in bladder habits.     Objective :  Temp:  [97.6 °F (36.4 °C)-98 °F (36.7 °C)] 97.8 °F (36.6 °C)  HR:  [] 90  BP: (150-179)/() 153/100  Resp:  [16-18] 18  SpO2:  [93 %-96 %] 94 %  O2 Device: None (Room air)    There is no height or weight on file to calculate BMI.     Input and Output Summary (last 24 hours):     Intake/Output Summary (Last 24 hours) at 10/22/2024 1522  Last data filed at 10/22/2024 1300  Gross per 24 hour   Intake 1960 ml   Output 400 ml   Net 1560 ml       Physical Exam  Constitutional:       General: He is not in acute distress.     Appearance: He is not ill-appearing.   Cardiovascular:      Rate and Rhythm: Normal rate.      Heart sounds: Normal heart sounds.  No murmur heard.  Pulmonary:      Effort: No respiratory distress.      Breath sounds: Normal breath sounds. No wheezing or rales.   Abdominal:      General: Bowel sounds are normal. There is no distension.      Tenderness: There is no abdominal tenderness. There is no guarding or rebound.   Musculoskeletal:      Right lower leg: No edema.      Left lower leg: No edema.           Lines/Drains:              Lab Results: I have reviewed the following results:   Results from last 7 days   Lab Units 10/22/24  0510   WBC Thousand/uL 10.59*   HEMOGLOBIN g/dL 14.9   HEMATOCRIT % 44.0   PLATELETS Thousands/uL 347   SEGS PCT % 79*   LYMPHO PCT % 9*   MONO PCT % 12   EOS PCT % 0     Results from last 7 days   Lab Units 10/22/24  0510 10/21/24  1610   SODIUM mmol/L 138 139   POTASSIUM mmol/L 3.5 3.2*   CHLORIDE mmol/L 105 99   CO2 mmol/L 18* 26   BUN mg/dL 25 21   CREATININE mg/dL 1.31* 1.55*   ANION GAP mmol/L 15* 14*   CALCIUM mg/dL 8.7 9.9   ALBUMIN g/dL  --  4.9   TOTAL BILIRUBIN mg/dL  --  0.90   ALK PHOS U/L  --  64   ALT U/L  --  13   AST U/L  --  17   GLUCOSE RANDOM mg/dL 122 128                 Results from last 7 days   Lab Units 10/22/24  0715 10/22/24  0510 10/21/24  1610   LACTIC ACID mmol/L 1.1  --   --    PROCALCITONIN ng/ml  --  0.07 <0.05       Recent Cultures (last 7 days):         Imaging Results Review: I reviewed radiology reports from this admission including: CTAP with contrast.  Other Study Results Review: EKG was reviewed.     Last 24 Hours Medication List:     Current Facility-Administered Medications:     bisacodyl (DULCOLAX) rectal suppository 10 mg, Daily PRN    hydrALAZINE (APRESOLINE) injection 10 mg, Q8H PRN    lactated ringers infusion, Continuous, Last Rate: 100 mL/hr (10/22/24 1428)    pantoprazole (PROTONIX) EC tablet 40 mg, Daily Before Breakfast    trimethobenzamide (TIGAN) IM injection 200 mg, Q6H PRN    Administrative Statements   Today, Patient Was Seen By: Gerri Mercado,  MD      **Please Note: This note may have been constructed using a voice recognition system.**

## 2024-10-22 NOTE — ASSESSMENT & PLAN NOTE
Darin Bullock is a 54 y.o. male with no significant PMHx or PSHx presenting due to 1 week of constipation and new onset nausea and vomiting following consumption of GoLytely today.  CT scan shows large volume stool and gas throughout the colon, with the exception of decompressed sigmoid colon, concern for large bowel obstruction.     - Clears as tolerated  - suppositories and enemas  - monitor abdominal exam  - if no return of bowel function in 48 hours from admission, recommend engaging GI  -No surgical intervention at this time, we will continue to follow

## 2024-10-22 NOTE — ASSESSMENT & PLAN NOTE
Patient denies fever, chills, cough, shortness of breath, chest pain, dysuria.SIRS criteria: Leukocytosis, tachycardia  Suspect reactive due to recent nausea/vomiting 2/2 constipation  Obtain UA.  Monitor off antibiotics.  Monitor vital signs, laboratory studies.

## 2024-10-22 NOTE — ASSESSMENT & PLAN NOTE
Darin Bullock is a 54 y.o. male with no significant PMHx or PSHx presenting due to 1 week of constipation and new onset nausea and vomiting following consumption of GoLytely today.  CT scan shows large volume stool and gas throughout the colon, with the exception of decompressed sigmoid colon, concern for large bowel obstruction.     - Clears as tolerated  - suppositories and enemas  - monitor abdominal exam  - if no return of bowel function in 48 hours, recommend engaging GI  -No surgical intervention at this time, we will continue to follow

## 2024-10-22 NOTE — PROGRESS NOTES
Progress Note - Surgery-General   Name: Darin Bullock 54 y.o. male I MRN: [id number]  Unit/Bed#: W -01 I Date of Admission: 10/21/2024   Date of Service: 10/22/2024 I Hospital Day: 1    Assessment & Plan  Constipation  Darin Bullock is a 54 y.o. male with no significant PMHx or PSHx presenting due to 1 week of constipation and new onset nausea and vomiting following consumption of GoLytely today.  CT scan shows large volume stool and gas throughout the colon, with the exception of decompressed sigmoid colon, concern for large bowel obstruction. S/P mineral oil enemas, patient reports small BM and some flatus.    - Clears toast cracker as tolerated  - Switch to MIVF 100  - Continue dulcolax suppositories, enemas  - Monitor abdominal exam  - Consider GI consult for scope  - No surgical intervention at this time, we will continue to follow    Please contact the SecureChat role for the Colorectal service with any questions/concerns.    Subjective   No acute events overnight. Patient reports pain is about the same, describes it as discomfort. They are experiencing some nausea and vomiting with clear liquids as they report reflux that is more well-controlled with more solid food.  They report a small bowel movement and a small amount of flatus after enema.  They are ambulating.  They deny chest pain, shortness of breath, fevers, chills.      Objective :  Temp:  [97.4 °F (36.3 °C)-98 °F (36.7 °C)] 97.8 °F (36.6 °C)  HR:  [] 114  BP: (130-179)/() 158/102  Resp:  [16-18] 18  SpO2:  [93 %-97 %] 95 %  O2 Device: None (Room air)    I/O         10/20 0701  10/21 0700 10/21 0701  10/22 0700 10/22 0701  10/23 0700    P.O.   480    IV Piggyback  1000     Total Intake  1000 480    Urine  150 250    Total Output  150 250    Net  +850 +230           Unmeasured Urine Occurrence  1 x             Physical Exam  Constitutional:       General: He is not in acute distress.  HENT:      Head: Normocephalic and atraumatic.       Right Ear: External ear normal.      Left Ear: External ear normal.      Nose: Nose normal.      Mouth/Throat:      Pharynx: Oropharynx is clear.   Eyes:      Extraocular Movements: Extraocular movements intact.   Cardiovascular:      Rate and Rhythm: Normal rate.   Pulmonary:      Effort: Pulmonary effort is normal.   Abdominal:      General: There is distension.      Palpations: Abdomen is soft.      Tenderness: There is no abdominal tenderness.   Musculoskeletal:         General: No swelling.      Cervical back: Normal range of motion.   Skin:     General: Skin is warm and dry.   Neurological:      Mental Status: He is alert. Mental status is at baseline.   Psychiatric:         Mood and Affect: Mood normal.           Lab Results: I have reviewed the following results:  Recent Labs     10/21/24  1610 10/22/24  0510 10/22/24  0715   WBC 14.92* 10.59*  --    HGB 16.6 14.9  --    HCT 47.9 44.0  --    * 347  --    SODIUM 139 138  --    K 3.2* 3.5  --    CL 99 105  --    CO2 26 18*  --    BUN 21 25  --    CREATININE 1.55* 1.31*  --    GLUC 128 122  --    MG 2.6  --  2.4   AST 17  --   --    ALT 13  --   --    ALB 4.9  --   --    TBILI 0.90  --   --    ALKPHOS 64  --   --    LACTICACID  --   --  1.1       Imaging Results Review: I reviewed radiology reports from this admission including: CT abdomen/pelvis.  Other Study Results Review: No additional pertinent studies reviewed.    VTE Pharmacologic Prophylaxis: VTE covered by:    None     VTE Mechanical Prophylaxis: sequential compression device

## 2024-10-22 NOTE — ASSESSMENT & PLAN NOTE
Creatinine upon admission: 1.55  Baseline: Unsure of baseline creatinine due to limited lab records  IV hydration.  Monitor BMP.

## 2024-10-22 NOTE — ASSESSMENT & PLAN NOTE
"Presentation: Patient presents to the ED for the second time today due to complaints of constipation for 1 week. Patient was seen in [facility] ED this morning at which time he was discharged home with GoLytely, Miralax prescription and GI outpatient follow up. He returned to [facility] ED due to new onset of nausea and vomiting following consumption 58 ounces of GoLytely. Patient denies ever undergoing a colonoscopy in the past.  CT A/P: \"Severe constipation. Distended fluid-filled loops of small bowel likely secondary to constipation however enteritis and/or low-grade obstruction not excluded \"  Colorectal surgery consulted -- appreciate recommendations.  Monitor abdominal exam.  Suppositories and enemas.  Clear liquids as tolerated.  If no return of bowel function in 48 hours, recommending GI consult.  Stool record at bedside.  "

## 2024-10-22 NOTE — UTILIZATION REVIEW
Initial Clinical Review    Admission: Date/Time/Statement:   Admission Orders (From admission, onward)       Ordered        10/21/24 2052  INPATIENT ADMISSION  Once                          Orders Placed This Encounter   Procedures    INPATIENT ADMISSION     Standing Status:   Standing     Number of Occurrences:   1     Order Specific Question:   Level of Care     Answer:   Med Surg [16]     Order Specific Question:   Estimated length of stay     Answer:   More than 2 Midnights     Order Specific Question:   Certification     Answer:   I certify that inpatient services are medically necessary for this patient for a duration of greater than two midnights. See H&P and MD Progress Notes for additional information about the patient's course of treatment.     ED Arrival Information       Expected   10/21/2024 14:53    Arrival   10/21/2024 14:56    Acuity   Urgent              Means of arrival   Walk-In    Escorted by   Self    Service   Hospitalist    Admission type   Emergency              Arrival complaint   constipation             Chief Complaint   Patient presents with    Constipation     Pt reports he has constipation was seen at [facility] this am and given go-lyte with no results but then vomited it up. Pt was seen at [facility] and told he should have imaging.        Initial Presentation: 54 y.o. male w/o signif PMH who presents to ED from home with 1 week of constipation . Pt was seen in [facility] ED earlier today and was d/c'ed home w/GoLytely, Miralax prescription and GI outpatient follow up. Pt now has  new onset nausea and vomiting following consumption of GoLytely today. On exam, decreased bowel sounds, abdomen distended .   Labs : WBC 14.92. K 3.2, creat 1.55 with no documented baseline .  CT  shows severe constipation , distended fluid-filled loops of small bowel likely secondary to constipation however enteritis and/or low-grade obstruction not excluded .EKG displayed NSR, nonspecific T wave abnormality, heart  rate 98, QTc 699.  Pt given IVF, IV antiemetic in ED. Pt admitted as Inpatient with constipation , elevated serum creat. Nausea and vomiting suspect 2/2 constipation .  PLan- Colorectal sx consult . Monitor abdominal exam . Suppositories ansd enemas.  C CL diet as jasbir. IVF. If no return of bowel function in 48 hours, recommending GI consult. Monitor BMP. ECG in am to eval QTc. Replete lytes as needed.   Anticipated Length of Stay/Certification Statement:  Patient will be admitted on an inpatient basis with an anticipated length of stay of greater than 2 midnights secondary to constipation .     Colorectal sx consult - After consuming about half of the GoLytely as outpt , patient had a large bout of emesis a few hours later. Patient was seen in by outpatient GI who recommended further workup in the ED.  CT scan shows large volume stool and gas throughout the colon, with the exception of decompressed sigmoid colon, concern for large bowel obstruction. Abdomen is soft, nontender, distended, tympanic. No previous significant family history or colonoscopy. PLan- CL as jasbir. Suppos and enemas. No surgical intervention at this time . Monit abdomen, bowel function . Consider GI consult I no bowel function  in 48 hrs .     Date: 10/22   Day 2:    BP elevated overnight, this am . 161/105 . Patient reports pain is controlled , just having feelings of abdominal bloating.  Not tolerating clear liquid diet.  Reports intermittent nausea and 1 episode of emesis overnight.  Voiding spontaneously   States he has received suppository and enemas without bowel function yet, not passing flatus or bm . On exam, abdomen tympanic . IVF infusing . Creat down to 1.31. K 3.5 today .   Update  pt had small bm today around 1200 ,  denies abdominal pain .  Tolerating CL diet started this am . No nausea or vomiting. Soft nontender abdomen. Bowel sounds present. Continue aggressive bowel regime .   Repeat EKG demonstrated Qtc prolonged at 505, but  improved from 699 on admission . PRN Tigan for nausea and vomiting given prolonged QTc. Beta-hydroxybutyrate elevated to 1.64 . Continue IVF . PRN Hydralazine added . CBC, BMP,  phos in am .             ED Treatment-Medication Administration from 10/21/2024 1453 to 10/21/2024 2143         Date/Time Order Dose Route Action     10/21/2024 1609 sodium chloride 0.9 % bolus 1,000 mL 1,000 mL Intravenous New Bag     10/21/2024 1712 iohexol (OMNIPAQUE) 350 MG/ML injection (MULTI-DOSE) 100 mL 100 mL Intravenous Given     10/21/2024 1935 Famotidine (PF) (PEPCID) injection 20 mg 20 mg Intravenous Given     10/21/2024 1947 ondansetron (ZOFRAN) injection 4 mg 4 mg Intravenous Given            Scheduled Medications:  pantoprazole, 40 mg, Oral, Daily Before Breakfast    potassium chloride 20 mEq IVPB (premix)  Dose: 20 mEq  Freq: Every 2 hours Route: IV  Last Dose: 20 mEq (10/22/24 0143)  Start: 10/21/24 2245 End: 10/22/24 0343  mineral oil enema 1 enema  Dose: 1 enema  Freq: Once Route: RE x1 10/21 @ 2230. X1 10/22 @ 0924         Continuous IV Infusions:  lactated ringers, 100 mL/hr, Intravenous, Continuous      PRN Meds:  bisacodyl, 10 mg, Rectal, Daily PRN x1 10/22  trimethobenzamide, 200 mg, Intramuscular, Q6H PRN      ED Triage Vitals   Temperature Pulse Respirations Blood Pressure SpO2 Pain Score   10/21/24 1508 10/21/24 1507 10/21/24 1507 10/21/24 1507 10/21/24 1507 10/21/24 1939   (!) 97.4 °F (36.3 °C) 100 18 142/87 95 % No Pain     Weight (last 2 days)       None            Vital Signs (last 3 days)       Date/Time Temp Pulse Resp BP MAP (mmHg) SpO2 O2 Device Patient Position - Orthostatic VS [place] Coma Scale Score Pain    10/22/24 0916 -- -- -- -- -- -- -- -- 15 --    10/22/24 0900 -- 114 -- 158/102 121 95 % -- -- -- --    10/22/24 08:53:28 -- 95 18 158/102 121 94 % -- -- -- --    10/22/24 08:39:07 97.8 °F (36.6 °C) 89 18 159/106 124 93 % -- -- -- --    10/21/24 23:32:37 97.6 °F (36.4 °C) 95 16 161/105 124 94 % -- --  -- --    10/21/24 23:24:26 -- 95 -- 161/105 124 95 % -- -- -- --    10/21/24 2152 -- -- -- -- -- -- None (Room air) -- 15 No Pain    10/21/24 21:47:44 98 °F (36.7 °C) 102 16 157/108 124 94 % -- -- -- --    10/21/24 2130 -- 100 18 163/95 125 96 % None (Room air) Lying -- --    10/21/24 1939 -- 96 18 158/95 120 94 % None (Room air) Sitting -- No Pain    10/21/24 1900 -- 98 18 150/100 122 95 % -- -- -- --    10/21/24 1830 -- 98 18 179/107 136 95 % None (Room air) Sitting -- --    10/21/24 1600 -- 98 18 153/91 117 95 % -- -- -- --    10/21/24 1549 -- -- -- -- -- -- -- -- 15 --    10/21/24 1545 -- 98 18 158/90 114 95 % -- -- -- --    10/21/24 1508 97.4 °F (36.3 °C) -- -- -- -- -- -- -- -- --    10/21/24 1507 -- 100 18 142/87 -- 95 % None (Room air) Sitting -- --              Pertinent Labs/Diagnostic Test Results:   Radiology:  CT abdomen pelvis with contrast   Final Interpretation by Louis Solis MD (10/21 1900)      Severe constipation. Distended fluid-filled loops of small bowel likely secondary to constipation however enteritis and/or low-grade obstruction not excluded         Workstation performed: [id number]           Cardiology:  ECG 12 lead   Final Result by Gerard Morales MD (10/22 1010)   Normal sinus rhythm   Voltage criteria for left ventricular hypertrophy   Nonspecific T wave abnormality   Prolonged QT   Abnormal ECG   When compared with ECG of 21-OCT-2024 22:15,   Nonspecific T wave abnormality has replaced inverted T waves in Inferior    leads   Nonspecific T wave abnormality now evident in Anterior leads   QT has shortened   Confirmed by Gerard Morales ([id number]) on 10/22/2024 10:10:18 AM      ECG 12 lead   Final Result by Gerard Morales MD (10/22 1811)   Normal sinus rhythm   Voltage criteria for left ventricular hypertrophy   ST & T wave abnormality, consider inferior ischemia   Abnormal ECG   No previous ECGs available   Confirmed by Gerard Morales ([id number]) on 10/22/2024 7:51:23 AM         GI:  No orders to display           Results from last 7 days   Lab Units 10/22/24  0510 10/21/24  1610   WBC Thousand/uL 10.59* 14.92*   HEMOGLOBIN g/dL 14.9 16.6   HEMATOCRIT % 44.0 47.9   PLATELETS Thousands/uL 347 397*   TOTAL NEUT ABS Thousands/µL 8.34* 12.79*         Results from last 7 days   Lab Units 10/22/24  0715 10/22/24  0510 10/21/24  1610   SODIUM mmol/L  --  138 139   POTASSIUM mmol/L  --  3.5 3.2*   CHLORIDE mmol/L  --  105 99   CO2 mmol/L  --  18* 26   ANION GAP mmol/L  --  15* 14*   BUN mg/dL  --  25 21   CREATININE mg/dL  --  1.31* 1.55*   EGFR ml/min/1.73sq m  --  60 49   CALCIUM mg/dL  --  8.7 9.9   MAGNESIUM mg/dL 2.4  --  2.6     Results from last 7 days   Lab Units 10/21/24  1610   AST U/L 17   ALT U/L 13   ALK PHOS U/L 64   TOTAL PROTEIN g/dL 8.4   ALBUMIN g/dL 4.9   TOTAL BILIRUBIN mg/dL 0.90         Results from last 7 days   Lab Units 10/22/24  0510 10/21/24  1610   GLUCOSE RANDOM mg/dL 122 128             Beta- Hydroxybutyrate   Date Value Ref Range Status   10/22/2024 1.64 (H) 0.02 - 0.27 mmol/L Final          Results from last 7 days   Lab Units 10/22/24  0851   PH IRVIN  7.389   PCO2 IRVIN mm Hg 37.5*   PO2 IRVIN mm Hg 60.9*   HCO3 IRVIN mmol/L 22.1*   BASE EXC IRVIN mmol/L -2.3   O2 CONTENT IRVIN ml/dL 19.3   O2 HGB, VENOUS % 88.6*                         Results from last 7 days   Lab Units 10/22/24  0510   TSH 3RD GENERATON uIU/mL 0.761     Results from last 7 days   Lab Units 10/22/24  0510 10/21/24  1610   PROCALCITONIN ng/ml 0.07 <0.05     Results from last 7 days   Lab Units 10/22/24  0715   LACTIC ACID mmol/L 1.1                   Results from last 7 days   Lab Units 10/22/24  0224   CLARITY UA  Clear   COLOR UA  Yellow   SPEC GRAV UA  >=1.050*   PH UA  5.5   GLUCOSE UA mg/dl Negative   KETONES UA mg/dl 60 (2+)*   BLOOD UA  Negative   PROTEIN UA mg/dl 50 (1+)*   NITRITE UA  Negative   BILIRUBIN UA  Negative   UROBILINOGEN UA (BE) mg/dl <2.0   LEUKOCYTES UA  Negative   WBC UA /hpf None  Seen   RBC UA /hpf 1-2   BACTERIA UA /hpf None Seen   EPITHELIAL CELLS WET PREP /hpf None Seen   MUCUS THREADS  Innumerable*             History reviewed. No pertinent past medical history.  Present on Admission:  **None**      Admitting Diagnosis: Hypokalemia [E87.6]  Constipation [K59.00]  Elevated serum creatinine [R79.89]  Age/Sex: 54 y.o. male    Network Utilization Review Department  ATTENTION: Please call with any questions or concerns to [phone] and carefully listen to the prompts so that you are directed to the right person. All voicemails are confidential.   For Discharge needs, contact Care Management DC Support Team at [phone] opt. 2  Send all requests for admission clinical reviews, approved or denied determinations and any other requests to dedicated fax number below belonging to the campus where the patient is receiving treatment. List of dedicated fax numbers for the Facilities:  FACILITY NAME UR FAX NUMBER   ADMISSION DENIALS (Administrative/Medical Necessity) [phone]   DISCHARGE SUPPORT TEAM (NETWORK) [phone]   PARENT CHILD HEALTH (Maternity/NICU/Pediatrics) [phone]   [facility] [phone]   [facility] [phone]   [facility] [phone]   [facility] [phone]   [facility] [phone]   [facility] [phone]   [facility] [phone]   [facility] [phone]   [facility] [phone]   [facility] [phone]   [facility] [phone]   [facility] [phone]

## 2024-10-22 NOTE — ASSESSMENT & PLAN NOTE
Patient denies chest pain.  EKG displayed NSR, nonspecific T wave abnormality, heart rate 98, QTc 699.  Replete electrolytes.  Repeat EKG in AM.

## 2024-10-22 NOTE — UTILIZATION REVIEW
NOTIFICATION OF INPATIENT ADMISSION   AUTHORIZATION REQUEST   SERVICING FACILITY:   [place] [address]  Tax ID: [id number]  NPI: [id number]   ATTENDING PROVIDER:  Attending Name and NPI#: Opal Newell Md [[id number]]  Address: [address] [place] [address]  Phone: [phone]     ADMISSION INFORMATION:  Place of Service: Inpatient [facility]  Place of Service Code: 21  Inpatient Admission Date/Time: 10/21/24  8:52 PM  Discharge Date/Time: No discharge date for patient encounter.  Admitting Diagnosis Code/Description:  Hypokalemia [E87.6]  Constipation [K59.00]  Elevated serum creatinine [R79.89]     UTILIZATION REVIEW CONTACT:  Lois Falk Utilization   Network Utilization Review Department  Phone: [phone]  Fax: [phone]  Email: [email or web address]  Contact for approvals/pending authorizations, clinical reviews, and discharge.     PHYSICIAN ADVISORY SERVICES:  Medical Necessity Denial & Icnn-zv-Bpnq Review  Phone: [phone]  Fax: [phone]  Email: [email or web address]     DISCHARGE SUPPORT TEAM:  For Patients Discharge Needs & Updates  Phone: [phone] opt. 2 Fax: [phone]  Email: [email or web address]

## 2024-10-22 NOTE — ASSESSMENT & PLAN NOTE
Patient denies chest pain.  EKG displayed NSR, nonspecific T wave abnormality, heart rate 98, QTc 699.  Replete electrolytes.  Repeat EKG demonstrated Qtc prolonged at 505, but improved from 699 on admission.

## 2024-10-22 NOTE — ASSESSMENT & PLAN NOTE
"Presentation: Patient presents to the ED for the second time today due to complaints of constipation for 1 week. Patient was seen in [facility] ED this morning at which time he was discharged home with GoLytely, Miralax prescription and GI outpatient follow up. He returned to [facility] ED due to new onset of nausea and vomiting following consumption 58 ounces of GoLytely. Patient denies ever undergoing a colonoscopy in the past.  CT A/P: \"Severe constipation. Distended fluid-filled loops of small bowel likely secondary to constipation however enteritis and/or low-grade obstruction not excluded \"  Colorectal surgery consulted  Monitor abdominal exam.  Suppositories and enemas.  Clear liquids as tolerated.  If no return of bowel function in 48 hours, recommending GI consult.  Stool record at bedside.  "

## 2024-10-22 NOTE — ASSESSMENT & PLAN NOTE
Darin Bullock is a 54 y.o. male with no significant PMHx or PSHx presenting due to 1 week of constipation and new onset nausea and vomiting following consumption of GoLytely today.  CT scan shows large volume stool and gas throughout the colon, with the exception of decompressed sigmoid colon, concern for large bowel obstruction. S/P mineral oil enemas, patient reports small BM and some flatus.    - Clears toast cracker as tolerated  - Switch to MIVF 100  - Continue dulcolax suppositories, enemas  - Monitor abdominal exam  - Consider GI consult for scope  - No surgical intervention at this time, we will continue to follow

## 2024-10-22 NOTE — CONSULTS
"Consultation - Surgery-General   Name: Darin Bullock 54 y.o. male I MRN: [id number]  Unit/Bed#: ED-36 I Date of Admission: 10/21/2024   Date of Service: 10/21/2024 I Hospital Day: 0   Inpatient consult to Colorectal Surgery  Consult performed by: Amor Garcia MD  Consult ordered by: Cherie Donald PA-C        Physician Requesting Evaluation: Aida Malik   Reason for Evaluation / Principal Problem: constipation   Assessment & Plan  Constipation  Darin Bullock is a 54 y.o. male with no significant PMHx or PSHx presenting due to 1 week of constipation and new onset nausea and vomiting following consumption of GoLytely today.  CT scan shows large volume stool and gas throughout the colon, with the exception of decompressed sigmoid colon, concern for large bowel obstruction.     - Clears as tolerated  - suppositories and enemas  - monitor abdominal exam  - if no return of bowel function in 48 hours, recommend engaging GI  -No surgical intervention at this time, we will continue to follow  Please contact the SecureChat role,\"AN Surgery Resident\", with any questions/concerns.    History of Present Illness   Darin Bullock is a 54 y.o. male with no significant PMHx or PSHx presenting due to 1 week of constipation & increasing abdominal distention. Attempted Dulcolax at home last night with no improvement.  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  Patient recently drinking protein shakes and taking MiraLAX to help improve bowel movements.  Given his increasing abdominal distention and continued constipation, patient presented to the ED.  Patient was seen in the [facility]"" ED this morning and given GoLytely for constipation & discharged.  No labs or imaging were obtained at this morning's ED visit.  After consuming about half of the GoLytely, patient had a large bout of emesis a few hours later. Patient was seen in by " outpatient GI who recommended further workup in the ED. Reports 1 week without bowel movement & a few days since passing flatus. Has decreased appetite over the last couple days. First day of nausea/vomiting was today after the GoLytely is still having n/v. Denies fevers, chills, urinary symptoms. No previous significant family history or colonoscopy. Abdomen is soft, nontender, distended, tympanic. Vitals stable on room air. WBC 14.9, Hgb 16.6. CT abdomen pelvis shows large volume stool and gas throughout the colon, with the exception of decompressed sigmoid colon, concern for large bowel obstruction.  Given history and clinical picture, plan as above.    Review of Systems   Constitutional:  Positive for appetite change. Negative for chills and fever.   Respiratory:  Negative for shortness of breath.    Cardiovascular:  Negative for chest pain.   Gastrointestinal:  Positive for abdominal distention, constipation, nausea and vomiting. Negative for abdominal pain.   Genitourinary:  Negative for difficulty urinating and dysuria.     I have reviewed the patient's PMH, PSH, Social History, Family History, Meds, and Allergies  Historical Information   History reviewed. No pertinent past medical history.  History reviewed. No pertinent surgical history.  Social History     Tobacco Use    Smoking status: Never     Passive exposure: Never    Smokeless tobacco: Never   Substance and Sexual Activity    Alcohol use: Not Currently    Drug use: Never    Sexual activity: Not on file     E-Cigarette/Vaping     E-Cigarette/Vaping Substances     Family history non-contributory    Objective :  Temp:  [97.4 °F (36.3 °C)-97.9 °F (36.6 °C)] 97.4 °F (36.3 °C)  HR:  [] 96  BP: (130-179)/() 158/95  Resp:  [18] 18  SpO2:  [94 %-97 %] 94 %  O2 Device: None (Room air)      Physical Exam  Constitutional:       Appearance: Normal appearance.   HENT:      Head: Normocephalic and atraumatic.      Mouth/Throat:      Mouth: Mucous  membranes are moist.      Pharynx: Oropharynx is clear.   Eyes:      Extraocular Movements: Extraocular movements intact.      Pupils: Pupils are equal, round, and reactive to light.   Cardiovascular:      Rate and Rhythm: Normal rate.   Pulmonary:      Effort: Pulmonary effort is normal.   Abdominal:      General: There is distension.      Palpations: Abdomen is soft.      Tenderness: There is no abdominal tenderness.      Comments: tympanic   Musculoskeletal:         General: Normal range of motion.   Skin:     General: Skin is warm and dry.   Neurological:      General: No focal deficit present.      Mental Status: He is alert and oriented to person, place, and time.   Psychiatric:         Mood and Affect: Mood normal.         Behavior: Behavior normal.       Lab Results: I have reviewed the following results:  Recent Labs     10/21/24  1610   WBC 14.92*   HGB 16.6   HCT 47.9   *   SODIUM 139   K 3.2*   CL 99   CO2 26   BUN 21   CREATININE 1.55*   GLUC 128   AST 17   ALT 13   ALB 4.9   TBILI 0.90   ALKPHOS 64       Imaging Results Review: I personally reviewed the following image studies in PACS and associated radiology reports: CT abdomen/pelvis. My interpretation of the radiology images/reports is: concern for large bowel obstruction.  Other Study Results Review: No additional pertinent studies reviewed.      Administrative Statements   I have spent a total time of 20 minutes in caring for this patient on the day of the visit/encounter including Diagnostic results, Risks and benefits of tx options, Instructions for management, Patient and family education, Counseling / Coordination of care, Documenting in the medical record, Reviewing / ordering tests, medicine, procedures  , Obtaining or reviewing history  , and Communicating with other healthcare professionals .    Amor Garcia MD  General Surgery Resident

## 2024-10-22 NOTE — PLAN OF CARE
Problem: PAIN - ADULT  Goal: Verbalizes/displays adequate comfort level or baseline comfort level  Description: Interventions:  - Encourage patient to monitor pain and request assistance  - Assess pain using appropriate pain scale  - Administer analgesics based on type and severity of pain and evaluate response  - Implement non-pharmacological measures as appropriate and evaluate response  - Consider cultural and social influences on pain and pain management  - Notify physician/advanced practitioner if interventions unsuccessful or patient reports new pain  Outcome: Progressing     Problem: INFECTION - ADULT  Goal: Absence or prevention of progression during hospitalization  Description: INTERVENTIONS:  - Assess and monitor for signs and symptoms of infection  - Monitor lab/diagnostic results  - Monitor all insertion sites, i.e. indwelling lines, tubes, and drains  - Monitor endotracheal if appropriate and nasal secretions for changes in amount and color  - [place] appropriate cooling/warming therapies per order  - Administer medications as ordered  - Instruct and encourage patient and family to use good hand hygiene technique  - Identify and instruct in appropriate isolation precautions for identified infection/condition  Outcome: Progressing  Goal: Absence of fever/infection during neutropenic period  Description: INTERVENTIONS:  - Monitor WBC    Outcome: Progressing     Problem: SAFETY ADULT  Goal: Patient will remain free of falls  Description: INTERVENTIONS:  - Educate patient/family on patient safety including physical limitations  - Instruct patient to call for assistance with activity   - Consult OT/PT to assist with strengthening/mobility   - Keep Call bell within reach  - Keep bed low and locked with side rails adjusted as appropriate  - Keep care items and personal belongings within reach  - Initiate and maintain comfort rounds  - Make Fall Risk Sign visible to staff  - Offer Toileting every  Hours,  in advance of need  - Initiate/Maintain alarm  - Obtain necessary fall risk management equipment:   - Apply yellow socks and bracelet for high fall risk patients  - Consider moving patient to room near nurses station  Outcome: Progressing  Goal: Maintain or return to baseline ADL function  Description: INTERVENTIONS:  -  Assess patient's ability to carry out ADLs; assess patient's baseline for ADL function and identify physical deficits which impact ability to perform ADLs (bathing, care of mouth/teeth, toileting, grooming, dressing, etc.)  - Assess/evaluate cause of self-care deficits   - Assess range of motion  - Assess patient's mobility; develop plan if impaired  - Assess patient's need for assistive devices and provide as appropriate  - Encourage maximum independence but intervene and supervise when necessary  - Involve family in performance of ADLs  - Assess for home care needs following discharge   - Consider OT consult to assist with ADL evaluation and planning for discharge  - Provide patient education as appropriate  Outcome: Progressing  Goal: Maintains/Returns to pre admission functional level  Description: INTERVENTIONS:  - Perform AM-PAC 6 Click Basic Mobility/ Daily Activity assessment daily.  - Set and communicate daily mobility goal to care team and patient/family/caregiver.   - Collaborate with rehabilitation services on mobility goals if consulted  - Perform Range of Motion  times a day.  - Reposition patient every  hours.  - Dangle patient  times a day  - Stand patient  times a day  - Ambulate patient  times a day  - Out of bed to chair  times a day   - Out of bed for meals  times a day  - Out of bed for toileting  - Record patient progress and toleration of activity level   Outcome: Progressing     Problem: DISCHARGE PLANNING  Goal: Discharge to home or other facility with appropriate resources  Description: INTERVENTIONS:  - Identify barriers to discharge w/patient and caregiver  - Arrange for  needed discharge resources and transportation as appropriate  - Identify discharge learning needs (meds, wound care, etc.)  - Arrange for interpretive services to assist at discharge as needed  - Refer to Case Management Department for coordinating discharge planning if the patient needs post-hospital services based on physician/advanced practitioner order or complex needs related to functional status, cognitive ability, or social support system  Outcome: Progressing     Problem: Knowledge Deficit  Goal: Patient/family/caregiver demonstrates understanding of disease process, treatment plan, medications, and discharge instructions  Description: Complete learning assessment and assess knowledge base.  Interventions:  - Provide teaching at level of understanding  - Provide teaching via preferred learning methods  Outcome: Progressing

## 2024-10-23 ENCOUNTER — HOSPITAL ENCOUNTER (INPATIENT)
Facility: HOSPITAL | Age: 54
LOS: 11 days | Discharge: HOME HEALTH | DRG: 231 | End: 2024-11-03
Attending: COLON & RECTAL SURGERY | Admitting: COLON & RECTAL SURGERY
Payer: COMMERCIAL

## 2024-10-23 ENCOUNTER — APPOINTMENT (INPATIENT)
Dept: RADIOLOGY | Facility: HOSPITAL | Age: 54
DRG: 254 | End: 2024-10-23
Payer: COMMERCIAL

## 2024-10-23 ENCOUNTER — APPOINTMENT (INPATIENT)
Dept: CT IMAGING | Facility: HOSPITAL | Age: 54
DRG: 254 | End: 2024-10-23
Payer: COMMERCIAL

## 2024-10-23 VITALS
HEART RATE: 87 BPM | RESPIRATION RATE: 16 BRPM | TEMPERATURE: 98.4 F | DIASTOLIC BLOOD PRESSURE: 103 MMHG | SYSTOLIC BLOOD PRESSURE: 173 MMHG | OXYGEN SATURATION: 93 %

## 2024-10-23 DIAGNOSIS — K59.09 OTHER CONSTIPATION: ICD-10-CM

## 2024-10-23 DIAGNOSIS — K63.89 COLONIC MASS: Primary | ICD-10-CM

## 2024-10-23 PROBLEM — K21.9 GERD (GASTROESOPHAGEAL REFLUX DISEASE): Status: ACTIVE | Noted: 2024-10-23

## 2024-10-23 LAB
ANION GAP SERPL CALCULATED.3IONS-SCNC: 12 MMOL/L (ref 4–13)
BUN SERPL-MCNC: 27 MG/DL (ref 5–25)
CALCIUM SERPL-MCNC: 9.1 MG/DL (ref 8.4–10.2)
CEA SERPL-MCNC: 13.4 NG/ML (ref 0–3)
CHLORIDE SERPL-SCNC: 103 MMOL/L (ref 96–108)
CO2 SERPL-SCNC: 24 MMOL/L (ref 21–32)
CREAT SERPL-MCNC: 1.29 MG/DL (ref 0.6–1.3)
ERYTHROCYTE [DISTWIDTH] IN BLOOD BY AUTOMATED COUNT: 12.9 % (ref 11.6–15.1)
GFR SERPL CREATININE-BSD FRML MDRD: 61 ML/MIN/1.73SQ M
GLUCOSE SERPL-MCNC: 129 MG/DL (ref 65–140)
HCT VFR BLD AUTO: 43.6 % (ref 36.5–49.3)
HGB BLD-MCNC: 14.6 G/DL (ref 12–17)
MCH RBC QN AUTO: 30.5 PG (ref 26.8–34.3)
MCHC RBC AUTO-ENTMCNC: 33.5 G/DL (ref 31.4–37.4)
MCV RBC AUTO: 91 FL (ref 82–98)
PHOSPHATE SERPL-MCNC: 3.5 MG/DL (ref 2.7–4.5)
PLATELET # BLD AUTO: 306 THOUSANDS/UL (ref 149–390)
PMV BLD AUTO: 9.3 FL (ref 8.9–12.7)
POTASSIUM SERPL-SCNC: 3.2 MMOL/L (ref 3.5–5.3)
RBC # BLD AUTO: 4.78 MILLION/UL (ref 3.88–5.62)
SODIUM SERPL-SCNC: 139 MMOL/L (ref 135–147)
WBC # BLD AUTO: 8.11 THOUSAND/UL (ref 4.31–10.16)

## 2024-10-23 PROCEDURE — 99239 HOSP IP/OBS DSCHRG MGMT >30: CPT | Performed by: INTERNAL MEDICINE

## 2024-10-23 PROCEDURE — 71250 CT THORAX DX C-: CPT

## 2024-10-23 PROCEDURE — 99233 SBSQ HOSP IP/OBS HIGH 50: CPT | Performed by: COLON & RECTAL SURGERY

## 2024-10-23 PROCEDURE — 84100 ASSAY OF PHOSPHORUS: CPT

## 2024-10-23 PROCEDURE — 74022 RADEX COMPL AQT ABD SERIES: CPT

## 2024-10-23 PROCEDURE — 80048 BASIC METABOLIC PNL TOTAL CA: CPT

## 2024-10-23 PROCEDURE — 82378 CARCINOEMBRYONIC ANTIGEN: CPT | Performed by: PHYSICIAN ASSISTANT

## 2024-10-23 PROCEDURE — 85027 COMPLETE CBC AUTOMATED: CPT

## 2024-10-23 RX ORDER — OMEPRAZOLE 40 MG/1
40 CAPSULE, DELAYED RELEASE ORAL DAILY
COMMUNITY

## 2024-10-23 RX ORDER — HYDROMORPHONE HCL IN WATER/PF 6 MG/30 ML
0.2 PATIENT CONTROLLED ANALGESIA SYRINGE INTRAVENOUS EVERY 4 HOURS PRN
Status: DISCONTINUED | OUTPATIENT
Start: 2024-10-23 | End: 2024-10-30

## 2024-10-23 RX ORDER — POLYETHYLENE GLYCOL 3350 17 G/17G
17 POWDER, FOR SOLUTION ORAL DAILY
Status: DISCONTINUED | OUTPATIENT
Start: 2024-10-23 | End: 2024-10-23 | Stop reason: HOSPADM

## 2024-10-23 RX ORDER — POTASSIUM CHLORIDE 1500 MG/1
40 TABLET, EXTENDED RELEASE ORAL 2 TIMES DAILY
Status: CANCELLED | OUTPATIENT
Start: 2024-10-23 | End: 2024-10-24

## 2024-10-23 RX ORDER — ONDANSETRON 2 MG/ML
4 INJECTION INTRAMUSCULAR; INTRAVENOUS EVERY 4 HOURS PRN
Status: DISCONTINUED | OUTPATIENT
Start: 2024-10-23 | End: 2024-11-03 | Stop reason: HOSPADM

## 2024-10-23 RX ORDER — MAGNESIUM HYDROXIDE/ALUMINUM HYDROXICE/SIMETHICONE 120; 1200; 1200 MG/30ML; MG/30ML; MG/30ML
30 SUSPENSION ORAL EVERY 4 HOURS PRN
Status: CANCELLED | OUTPATIENT
Start: 2024-10-23

## 2024-10-23 RX ORDER — POTASSIUM CHLORIDE 1500 MG/1
40 TABLET, EXTENDED RELEASE ORAL 2 TIMES DAILY
Status: COMPLETED | OUTPATIENT
Start: 2024-10-23 | End: 2024-10-23

## 2024-10-23 RX ORDER — LANOLIN ALCOHOL/MO/W.PET/CERES
3 CREAM (GRAM) TOPICAL
Status: DISCONTINUED | OUTPATIENT
Start: 2024-10-23 | End: 2024-10-23

## 2024-10-23 RX ORDER — PANTOPRAZOLE SODIUM 40 MG/1
40 TABLET, DELAYED RELEASE ORAL
Status: CANCELLED | OUTPATIENT
Start: 2024-10-24

## 2024-10-23 RX ORDER — HYDRALAZINE HYDROCHLORIDE 20 MG/ML
10 INJECTION INTRAMUSCULAR; INTRAVENOUS EVERY 8 HOURS PRN
Status: DISCONTINUED | OUTPATIENT
Start: 2024-10-23 | End: 2024-11-03 | Stop reason: HOSPADM

## 2024-10-23 RX ORDER — DEXTROSE MONOHYDRATE, SODIUM CHLORIDE, AND POTASSIUM CHLORIDE 50; 1.49; 4.5 G/1000ML; G/1000ML; G/1000ML
100 INJECTION, SOLUTION INTRAVENOUS CONTINUOUS
Status: CANCELLED | OUTPATIENT
Start: 2024-10-23

## 2024-10-23 RX ORDER — OXYCODONE HYDROCHLORIDE 5 MG/1
5 TABLET ORAL EVERY 4 HOURS PRN
Status: DISCONTINUED | OUTPATIENT
Start: 2024-10-23 | End: 2024-10-23

## 2024-10-23 RX ORDER — HEPARIN SODIUM 5000 [USP'U]/ML
5000 INJECTION, SOLUTION INTRAVENOUS; SUBCUTANEOUS EVERY 8 HOURS SCHEDULED
Status: DISCONTINUED | OUTPATIENT
Start: 2024-10-23 | End: 2024-11-01

## 2024-10-23 RX ORDER — ACETAMINOPHEN 325 MG/1
975 TABLET ORAL EVERY 6 HOURS SCHEDULED
Status: DISCONTINUED | OUTPATIENT
Start: 2024-10-23 | End: 2024-10-23

## 2024-10-23 RX ORDER — DEXTROSE MONOHYDRATE, SODIUM CHLORIDE, AND POTASSIUM CHLORIDE 50; 1.49; 4.5 G/1000ML; G/1000ML; G/1000ML
100 INJECTION, SOLUTION INTRAVENOUS CONTINUOUS
Status: DISCONTINUED | OUTPATIENT
Start: 2024-10-23 | End: 2024-10-23 | Stop reason: HOSPADM

## 2024-10-23 RX ORDER — HYDROMORPHONE HCL/PF 1 MG/ML
0.5 SYRINGE (ML) INJECTION EVERY 4 HOURS PRN
Status: DISCONTINUED | OUTPATIENT
Start: 2024-10-23 | End: 2024-11-03 | Stop reason: HOSPADM

## 2024-10-23 RX ORDER — POLYETHYLENE GLYCOL 3350 17 G/17G
17 POWDER, FOR SOLUTION ORAL DAILY
Status: CANCELLED | OUTPATIENT
Start: 2024-10-24

## 2024-10-23 RX ORDER — HYDRALAZINE HYDROCHLORIDE 20 MG/ML
10 INJECTION INTRAMUSCULAR; INTRAVENOUS EVERY 8 HOURS PRN
Status: CANCELLED | OUTPATIENT
Start: 2024-10-23

## 2024-10-23 RX ORDER — PANTOPRAZOLE SODIUM 40 MG/1
40 TABLET, DELAYED RELEASE ORAL
Status: DISCONTINUED | OUTPATIENT
Start: 2024-10-24 | End: 2024-10-23

## 2024-10-23 RX ORDER — IBUPROFEN 200 MG
400 TABLET ORAL EVERY 6 HOURS PRN
COMMUNITY
End: 2024-11-03

## 2024-10-23 RX ORDER — SENNOSIDES 8.6 MG
650 CAPSULE ORAL EVERY 8 HOURS PRN
COMMUNITY

## 2024-10-23 RX ORDER — PANTOPRAZOLE SODIUM 40 MG/10ML
40 INJECTION, POWDER, LYOPHILIZED, FOR SOLUTION INTRAVENOUS
Status: DISCONTINUED | OUTPATIENT
Start: 2024-10-24 | End: 2024-10-31

## 2024-10-23 RX ORDER — DEXTROSE MONOHYDRATE, SODIUM CHLORIDE, AND POTASSIUM CHLORIDE 50; 1.49; 4.5 G/1000ML; G/1000ML; G/1000ML
84 INJECTION, SOLUTION INTRAVENOUS CONTINUOUS
Status: DISCONTINUED | OUTPATIENT
Start: 2024-10-23 | End: 2024-10-26

## 2024-10-23 RX ORDER — BISACODYL 10 MG
10 SUPPOSITORY, RECTAL RECTAL DAILY PRN
Status: CANCELLED | OUTPATIENT
Start: 2024-10-23

## 2024-10-23 RX ADMIN — MINERAL OIL 1 ENEMA: 100 ENEMA RECTAL at 08:54

## 2024-10-23 RX ADMIN — POTASSIUM CHLORIDE 40 MEQ: 1500 TABLET, EXTENDED RELEASE ORAL at 12:04

## 2024-10-23 RX ADMIN — ACETAMINOPHEN 975 MG: 325 TABLET, FILM COATED ORAL at 19:35

## 2024-10-23 RX ADMIN — PANTOPRAZOLE SODIUM 40 MG: 40 TABLET, DELAYED RELEASE ORAL at 06:19

## 2024-10-23 RX ADMIN — HEPARIN SODIUM 5000 UNITS: 5000 INJECTION INTRAVENOUS; SUBCUTANEOUS at 21:10

## 2024-10-23 RX ADMIN — HYDRALAZINE HYDROCHLORIDE 10 MG: 20 INJECTION, SOLUTION INTRAMUSCULAR; INTRAVENOUS at 08:54

## 2024-10-23 RX ADMIN — DEXTROSE, SODIUM CHLORIDE, AND POTASSIUM CHLORIDE 100 ML/HR: 5; .45; .15 INJECTION INTRAVENOUS at 08:55

## 2024-10-23 RX ADMIN — MINERAL OIL 1 ENEMA: 100 ENEMA RECTAL at 06:23

## 2024-10-23 RX ADMIN — POTASSIUM CHLORIDE 40 MEQ: 1500 TABLET, EXTENDED RELEASE ORAL at 17:15

## 2024-10-23 RX ADMIN — DEXTROSE, SODIUM CHLORIDE, AND POTASSIUM CHLORIDE 100 ML/HR: 5; .45; .15 INJECTION INTRAVENOUS at 19:35

## 2024-10-23 NOTE — H&P
"H&P - Colorectal Surgery  Darin Bullock 54 y.o. male MRN: [id number]  Unit/Bed#: [facility] 834-01 Encounter: [id number]      Assessment:  54M with 1w of constipation/distention, found to have partially obstructing descending/sigmoid colon apple core lesion.    Plan:  - NPO, SanaF@125  - appreciate GI for colonic stenting  - plan for L colectomy some time afterwards  - DVT ppx    HPI:  Darin Bullock is a 54 y.o. male with no significant PMHx or PSHx presenting due to 1 week of constipation & increasing abdominal distention. 10/21 in [facility] ED he got GoLytely for constipation & discharged. Had a large bout of emesis a few hours later. Decreased appetite over last couple days. No previous significant family history or colonoscopy. CTAP showed large volume stool and gas throughout colon, decompressed sigmoid, with partially obstructing apple core lesion in descending/sigmoid colon junction.  Has had several small emeses the last few days, but no current n/v. No fevers. Last had jello earlier today. Had a small BM in the morning, passed flatus overnight.    Objective   Patient Vitals for the past 24 hrs:   BP Temp Pulse Resp SpO2 Height Weight   10/23/24 2233 (!) 159/112 98.8 °F (37.1 °C) 92 18 93 % -- --   10/23/24 1943 (!) 155/111 -- 92 -- 94 % -- --   10/23/24 1859 -- -- -- -- -- 5' 10\" (1.778 m) 84.9 kg (187 lb 2.7 oz)   10/23/24 1847 (!) 144/111 98.1 °F (36.7 °C) 94 18 94 % -- --       Physical Exam  Constitutional:       General: He is not in acute distress.  HENT:      Head: Normocephalic and atraumatic.   Eyes:      Extraocular Movements: Extraocular movements intact.      Conjunctiva/sclera: Conjunctivae normal.   Cardiovascular:      Rate and Rhythm: Normal rate.      Pulses: Normal pulses.   Pulmonary:      Effort: Pulmonary effort is normal. No respiratory distress.   Abdominal:      General: There is distension.      Palpations: Abdomen is soft.      Tenderness: There is no abdominal tenderness.   Musculoskeletal:  "        General: Normal range of motion.      Cervical back: Normal range of motion.   Skin:     General: Skin is warm and dry.   Neurological:      General: No focal deficit present.      Mental Status: He is alert and oriented to person, place, and time.   Psychiatric:         Mood and Affect: Mood normal.       Review of Systems   Constitutional:  Negative for chills and fever.   HENT:  Negative for ear pain and sore throat.    Eyes:  Negative for pain and visual disturbance.   Respiratory:  Negative for cough and shortness of breath.    Cardiovascular:  Negative for chest pain and palpitations.   Gastrointestinal:  Positive for abdominal distention, constipation, nausea and vomiting. Negative for abdominal pain and blood in stool.   Genitourinary:  Negative for dysuria and hematuria.   Musculoskeletal:  Negative for arthralgias and back pain.   Skin:  Negative for color change and rash.   Neurological:  Negative for seizures and syncope.   All other systems reviewed and are negative.      Meds: SQH, PPI (p)    Results:  10/21 CT AP: large volume stool and gas throughout colon, decompressed sigmoid, with partially obstructing apple core lesion in descending/sigmoid colon junction  10/23 CT chest: no mets    CEA 13.4  Recent Labs     10/21/24  1610 10/22/24  0510 10/22/24  0715 10/23/24  0430   WBC 14.92* 10.59*  --  8.11   HGB 16.6 14.9  --  14.6   * 347  --  306   SODIUM 139 138  --  139   K 3.2* 3.5  --  3.2*   CL 99 105  --  103   CO2 26 18*  --  24   BUN 21 25  --  27*   CREATININE 1.55* 1.31*  --  1.29   GLUC 128 122  --  129   CALCIUM 9.9 8.7  --  9.1   MG 2.6  --  2.4  --    PHOS  --   --   --  3.5   AST 17  --   --   --    ALT 13  --   --   --    ALKPHOS 64  --   --   --    TBILI 0.90  --   --   --    EGFR 49 60  --  61   LACTICACID  --   --  1.1  --      Lab Results   Component Value Date    LEUKOCYTESUR Negative 10/22/2024    NITRITE Negative 10/22/2024    GLUCOSEU Negative 10/22/2024    KETONESU  60 (2+) (A) 10/22/2024    BLOODU Negative 10/22/2024       Lab Results: I have personally reviewed pertinent lab results.  Imaging: I have personally reviewed pertinent reports.  EKG, Pathology, and Other Studies: I have personally reviewed pertinent reports.  All current active meds have been reviewed  Counseling / Coordination of Care: Total floor / unit time spent today 30 minutes.  Greater than 50% of total time was spent with the patient and / or family counseling and / or coordination of care.

## 2024-10-23 NOTE — ASSESSMENT & PLAN NOTE
-Resolved  -Suspected from constipation  -As needed antiemetics, IV fluid hydration, clear liquid diet as tolerated

## 2024-10-23 NOTE — ASSESSMENT & PLAN NOTE
"Presentation: Patient presents to the ED for the second time today due to complaints of constipation for 1 week. Patient was seen in [facility] ED this morning at which time he was discharged home with GoLytely, Miralax prescription and GI outpatient follow up. He returned to [facility] ED due to new onset of nausea and vomiting following consumption 58 ounces of GoLytely. Patient denies ever undergoing a colonoscopy in the past.  CT A/P: \"Severe constipation. Distended fluid-filled loops of small bowel likely secondary to constipation however enteritis and/or low-grade obstruction not excluded \"  Colorectal surgery consulted  Recommend transfer to [facility] for access to fluoroscopy ready GI lab for possible colectomy given apple core lesion seen on CT scan.  Will obtain CEA and CT chest to complete workup for metastatic disease.  SOB for colonoscopy, possible stenting and surgery  "

## 2024-10-23 NOTE — ASSESSMENT & PLAN NOTE
-Enema on Tuesday, October 22, 2024  -For mineral oil enema this morning  -Continue bowel regimen  -Consider flex sig versus full colonoscopy during this admission to rule out colonic mass.  I will discuss further with my attending.

## 2024-10-23 NOTE — ASSESSMENT & PLAN NOTE
Patient denies chest pain.  Replete electrolytes.  Repeat EKG demonstrated Qtc prolonged at 505, but improved from 699 on admission.

## 2024-10-23 NOTE — PROGRESS NOTES
"Progress Note - Hospitalist   Name: Darin Bullock 54 y.o. male I MRN: [id number]  Unit/Bed#: W -01 I Date of Admission: 10/21/2024   Date of Service: 10/23/2024 I Hospital Day: 2    Assessment & Plan  Constipation  Presentation: Patient presents to the ED for the second time today due to complaints of constipation for 1 week. Patient was seen [facility] ED this morning at which time he was discharged home with GoLytely, Miralax prescription and GI outpatient follow up. He returned to [facility] ED due to new onset of nausea and vomiting following consumption 58 ounces of GoLytely. Patient denies ever undergoing a colonoscopy in the past.  CT A/P: \"Severe constipation. Distended fluid-filled loops of small bowel likely secondary to constipation however enteritis and/or low-grade obstruction not excluded \"  Colorectal surgery consulted  Monitor abdominal exam.  Suppositories and enemas.  Clear liquids as tolerated.  If no return of bowel function in 48 hours, recommending GI consult.  Stool record at bedside.  SIRS (systemic inflammatory response syndrome) (HCC)  Patient denies fever, chills, cough, shortness of breath, chest pain, dysuria.SIRS criteria: Leukocytosis, tachycardia  Suspect reactive due to recent nausea/vomiting 2/2 constipation  Monitor off antibiotics.  Monitor vital signs, laboratory studies.  Lactic acid unremarkable  Beta-hydroxybutyrate elevated to 1.64  Elevated serum creatinine  Creatinine upon admission: 1.55  Baseline: Unsure of baseline creatinine due to limited lab records  IV hydration.  Monitor BMP.  Nausea and vomiting  Suspect 2/2 constipation.  IM Tigan.  IV hydration.  Clear liquids as tolerated.  Prolonged Q-T interval on ECG  Patient denies chest pain.  Replete electrolytes.  Repeat EKG demonstrated Qtc prolonged at 505, but improved from 699 on admission.  GERD (gastroesophageal reflux disease)  -Takes Prilosec at home    Plan:  -Protonix 40 daily  -Maalox every 4 hours as " needed    VTE Pharmacologic Prophylaxis: VTE Score: 2 Low Risk (Score 0-2) - Encourage Ambulation.    Mobility:   Basic Mobility Inpatient Raw Score: 24  JH-HLM Goal: 8: Walk 250 feet or more  JH-HLM Achieved: 6: Walk 10 steps or more  JH-HLM Goal achieved. Continue to encourage appropriate mobility.    Patient Centered Rounds: I evaluated the patient without nursing staff present due to nurse performing other clinical duties    Discussions with Specialists or Other Care Team Provider: Yes    Education and Discussions with Family / Patient: Patient declined call to .     Current Length of Stay: 2 day(s)  Current Patient Status: Inpatient   Certification Statement: The patient will continue to require additional inpatient hospital stay due to continued constipation  Discharge Plan: Anticipate discharge in 48 hrs to home.    Code Status: Level 1 - Full Code    Subjective   Patient was resting comfortably in bed when examined this morning.  He continues to feel bloated and abdominal discomfort but denies abdominal pain.  He states his discomfort is stable from yesterday.  Yesterday he was passing flatus with very small bowel movement after enema.  Ordered additional mineral oil enema this morning.  Patient had 2 episodes of vomiting yesterday and 1 earlier this morning.  He denies fever/chills, cough, shortness of breath, chest pain, changes in bladder habits.    Objective :  Temp:  [97.8 °F (36.6 °C)-98.4 °F (36.9 °C)] 97.8 °F (36.6 °C)  HR:  [] 102  BP: (134-162)/(100-112) 156/102  Resp:  [16-19] 17  SpO2:  [92 %-96 %] 93 %  O2 Device: None (Room air)    There is no height or weight on file to calculate BMI.     Input and Output Summary (last 24 hours):     Intake/Output Summary (Last 24 hours) at 10/23/2024 0644  Last data filed at 10/22/2024 1400  Gross per 24 hour   Intake 1960 ml   Output 250 ml   Net 1710 ml       Physical Exam  Constitutional:       General: He is not in acute distress.      Appearance: He is not ill-appearing or toxic-appearing.   Cardiovascular:      Heart sounds: Normal heart sounds. No murmur heard.     No gallop.   Pulmonary:      Effort: No respiratory distress.      Breath sounds: Normal breath sounds. No wheezing or rales.   Abdominal:      General: Bowel sounds are normal. There is distension.      Tenderness: There is no abdominal tenderness. There is no guarding.   Musculoskeletal:      Right lower leg: No edema.      Left lower leg: No edema.           Lines/Drains:              Lab Results: I have reviewed the following results:   Results from last 7 days   Lab Units 10/23/24  0430 10/22/24  0510   WBC Thousand/uL 8.11 10.59*   HEMOGLOBIN g/dL 14.6 14.9   HEMATOCRIT % 43.6 44.0   PLATELETS Thousands/uL 306 347   SEGS PCT %  --  79*   LYMPHO PCT %  --  9*   MONO PCT %  --  12   EOS PCT %  --  0     Results from last 7 days   Lab Units 10/23/24  0430 10/22/24  0510 10/21/24  1610   SODIUM mmol/L 139   < > 139   POTASSIUM mmol/L 3.2*   < > 3.2*   CHLORIDE mmol/L 103   < > 99   CO2 mmol/L 24   < > 26   BUN mg/dL 27*   < > 21   CREATININE mg/dL 1.29   < > 1.55*   ANION GAP mmol/L 12   < > 14*   CALCIUM mg/dL 9.1   < > 9.9   ALBUMIN g/dL  --   --  4.9   TOTAL BILIRUBIN mg/dL  --   --  0.90   ALK PHOS U/L  --   --  64   ALT U/L  --   --  13   AST U/L  --   --  17   GLUCOSE RANDOM mg/dL 129   < > 128    < > = values in this interval not displayed.                 Results from last 7 days   Lab Units 10/22/24  0715 10/22/24  0510 10/21/24  1610   LACTIC ACID mmol/L 1.1  --   --    PROCALCITONIN ng/ml  --  0.07 <0.05       Recent Cultures (last 7 days):         Imaging Results Review: I reviewed radiology reports from this admission including: CT abdomen/pelvis.  Other Study Results Review: EKG was reviewed.     Last 24 Hours Medication List:     Current Facility-Administered Medications:     aluminum-magnesium hydroxide-simethicone (MAALOX) oral suspension 30 mL, Q4H PRN     bisacodyl (DULCOLAX) rectal suppository 10 mg, Daily PRN    hydrALAZINE (APRESOLINE) injection 10 mg, Q8H PRN    lactated ringers infusion, Continuous, Last Rate: 100 mL/hr (10/22/24 7678)    pantoprazole (PROTONIX) EC tablet 40 mg, Daily Before Breakfast    trimethobenzamide (TIGAN) IM injection 200 mg, Q6H PRN    Administrative Statements   Today, Patient Was Seen By: Gerri Mercado MD      **Please Note: This note may have been constructed using a voice recognition system.**

## 2024-10-23 NOTE — ASSESSMENT & PLAN NOTE
-Patient is currently stable on pantoprazole 40 mg once daily.   No current daytime or night time break through symptoms.  No pain or difficulty swallowing.  No unintentional weight loss.

## 2024-10-23 NOTE — ASSESSMENT & PLAN NOTE
-Due to leukocytosis and tachycardia.  Denies fever chills cough shortness of breath, dysuria or chest pain.  -Being monitored off antibiotics

## 2024-10-23 NOTE — PLAN OF CARE
Problem: PAIN - ADULT  Goal: Verbalizes/displays adequate comfort level or baseline comfort level  Description: Interventions:  - Encourage patient to monitor pain and request assistance  - Assess pain using appropriate pain scale  - Administer analgesics based on type and severity of pain and evaluate response  - Implement non-pharmacological measures as appropriate and evaluate response  - Consider cultural and social influences on pain and pain management  - Notify physician/advanced practitioner if interventions unsuccessful or patient reports new pain  Outcome: Progressing     Problem: INFECTION - ADULT  Goal: Absence or prevention of progression during hospitalization  Description: INTERVENTIONS:  - Assess and monitor for signs and symptoms of infection  - Monitor lab/diagnostic results  - Monitor all insertion sites, i.e. indwelling lines, tubes, and drains  - Monitor endotracheal if appropriate and nasal secretions for changes in amount and color  - [place] appropriate cooling/warming therapies per order  - Administer medications as ordered  - Instruct and encourage patient and family to use good hand hygiene technique  - Identify and instruct in appropriate isolation precautions for identified infection/condition  Outcome: Progressing  Goal: Absence of fever/infection during neutropenic period  Description: INTERVENTIONS:  - Monitor WBC    Outcome: Progressing     Problem: SAFETY ADULT  Goal: Patient will remain free of falls  Description: INTERVENTIONS:  - Educate patient/family on patient safety including physical limitations  - Instruct patient to call for assistance with activity   - Consult OT/PT to assist with strengthening/mobility   - Keep Call bell within reach  - Keep bed low and locked with side rails adjusted as appropriate  - Keep care items and personal belongings within reach  - Initiate and maintain comfort rounds  - Make Fall Risk Sign visible to staff  - Offer Toileting every  Hours,  in advance of need  - Initiate/Maintain alarm  - Obtain necessary fall risk management equipment:   - Apply yellow socks and bracelet for high fall risk patients  - Consider moving patient to room near nurses station  Outcome: Progressing  Goal: Maintain or return to baseline ADL function  Description: INTERVENTIONS:  -  Assess patient's ability to carry out ADLs; assess patient's baseline for ADL function and identify physical deficits which impact ability to perform ADLs (bathing, care of mouth/teeth, toileting, grooming, dressing, etc.)  - Assess/evaluate cause of self-care deficits   - Assess range of motion  - Assess patient's mobility; develop plan if impaired  - Assess patient's need for assistive devices and provide as appropriate  - Encourage maximum independence but intervene and supervise when necessary  - Involve family in performance of ADLs  - Assess for home care needs following discharge   - Consider OT consult to assist with ADL evaluation and planning for discharge  - Provide patient education as appropriate  Outcome: Progressing  Goal: Maintains/Returns to pre admission functional level  Description: INTERVENTIONS:  - Perform AM-PAC 6 Click Basic Mobility/ Daily Activity assessment daily.  - Set and communicate daily mobility goal to care team and patient/family/caregiver.   - Collaborate with rehabilitation services on mobility goals if consulted  - Perform Range of Motion  times a day.  - Reposition patient every  hours.  - Dangle patient  times a day  - Stand patient  times a day  - Ambulate patient  times a day  - Out of bed to chair  times a day   - Out of bed for meals times a day  - Out of bed for toileting  - Record patient progress and toleration of activity level   Outcome: Progressing     Problem: DISCHARGE PLANNING  Goal: Discharge to home or other facility with appropriate resources  Description: INTERVENTIONS:  - Identify barriers to discharge w/patient and caregiver  - Arrange for  needed discharge resources and transportation as appropriate  - Identify discharge learning needs (meds, wound care, etc.)  - Arrange for interpretive services to assist at discharge as needed  - Refer to Case Management Department for coordinating discharge planning if the patient needs post-hospital services based on physician/advanced practitioner order or complex needs related to functional status, cognitive ability, or social support system  Outcome: Progressing     Problem: Knowledge Deficit  Goal: Patient/family/caregiver demonstrates understanding of disease process, treatment plan, medications, and discharge instructions  Description: Complete learning assessment and assess knowledge base.  Interventions:  - Provide teaching at level of understanding  - Provide teaching via preferred learning methods  Outcome: Progressing

## 2024-10-23 NOTE — ASSESSMENT & PLAN NOTE
"Presentation: Patient presents to the ED for the second time today due to complaints of constipation for 1 week. Patient was seen in [facility] ED this morning at which time he was discharged home with GoLytely, Miralax prescription and GI outpatient follow up. He returned to [facility] ED due to new onset of nausea and vomiting following consumption 58 ounces of GoLytely. Patient denies ever undergoing a colonoscopy in the past.  CT A/P: \"Severe constipation. Distended fluid-filled loops of small bowel likely secondary to constipation however enteritis and/or low-grade obstruction not excluded \"  Colorectal surgery consulted  Monitor abdominal exam.  Suppositories and enemas.  Clear liquids as tolerated.  If no return of bowel function in 48 hours, recommending GI consult.  Stool record at bedside.  "

## 2024-10-23 NOTE — DISCHARGE SUMMARY
"Discharge Summary - Hospitalist   Name: Darin Bullock 54 y.o. male I MRN: [id number]  Unit/Bed#: W -01 I Date of Admission: 10/21/2024   Date of Service: 10/23/2024 I Hospital Day: 2     Assessment & Plan  Constipation  Presentation: Patient presents to the ED for the second time today due to complaints of constipation for 1 week. Patient was seen in [facility] ED this morning at which time he was discharged home with GoLytely, Miralax prescription and GI outpatient follow up. He returned to [facility] ED due to new onset of nausea and vomiting following consumption 58 ounces of GoLytely. Patient denies ever undergoing a colonoscopy in the past.  CT A/P: \"Severe constipation. Distended fluid-filled loops of small bowel likely secondary to constipation however enteritis and/or low-grade obstruction not excluded \"  Colorectal surgery consulted  Recommend transfer to [facility] for access to fluoroscopy ready GI lab for possible colectomy given apple core lesion seen on CT scan.  Will obtain CEA and CT chest to complete workup for metastatic disease.  SOB for colonoscopy, possible stenting and surgery  SIRS (systemic inflammatory response syndrome) (HCC)  Patient denies fever, chills, cough, shortness of breath, chest pain, dysuria.SIRS criteria: Leukocytosis, tachycardia  Suspect reactive due to recent nausea/vomiting 2/2 constipation  Monitor off antibiotics.  Monitor vital signs, laboratory studies.  Lactic acid unremarkable  Beta-hydroxybutyrate elevated to 1.64  Elevated serum creatinine  Creatinine upon admission: 1.55  Baseline: Unsure of baseline creatinine due to limited lab records  IV hydration.  Monitor BMP.  Nausea and vomiting  Suspect 2/2 constipation.  IM Tigan.  IV hydration.  Clear liquids as tolerated.  Prolonged Q-T interval on ECG  Patient denies chest pain.  Replete electrolytes.  Repeat EKG demonstrated Qtc prolonged at 505, but improved from 699 on admission.  GERD (gastroesophageal reflux disease)  -Takes " Prilosec at home    Plan:  -Protonix 40 daily  -Maalox every 4 hours as needed     Medical Problems       Resolved Problems  Date Reviewed: 10/21/2024   None       Discharging Physician / Practitioner: Gerri Mercado MD  PCP: No primary care provider on file.  Admission Date:   Admission Orders (From admission, onward)       Ordered        10/21/24 2052  INPATIENT ADMISSION  Once                          Discharge/Transfer Date: 10/23/24    Disposition:   Transfer to: [facility]  Reason for Transfer: Access to fluoroscopy lab and advanced GI suite for possible surgery.  Accepting Provider at [facility]: Dr. Llanos    Consultations During Hospital Stay:  Colorectal surgery    Procedures Performed:   None    Significant Findings / Test Results:   Dr. Llanos's interpretation of CTAP 10/21 reveals apple core lesion in descending/sigmoid colon junction.  X-ray abdomen obstruction series 10/23: Persistent small/large bowel distention with increased distal colorectal stool, similar to recent CT findings.     Incidental Findings:   None    Test Results Pending at Discharge (will require follow up):   CT chest  CEA  BMP     Outpatient Tests Requested:  None    Complications: None    Reason for Admission: Constipation 1 week    Hospital Course:   Darin Bullock is a 54 y.o. male patient who originally presented to the hospital on 10/21/2024 due to constipation of 1 week.  Colorectal surgery was consulted and patient received CTAP that revealed apple core lesion in descending/sigmoid colon junction.  CT chest was obtained and CEA to workup possible metastatic disease, pending at time of discharge.  Patient received 3 mineral oil enemas, only passing intermittent flatus with very small bowel movement s/p enema.  Patient had prolonged QTc, as needed Tigan was given due to few episodes of nonbloody emesis throughout the admission.  As per the recommendation of Dr. Llanos from colorectal surgery patient will be  transferred to [facility] for access to fluoroscopy and advanced GI suite for possible surgery.      Please see above list of diagnoses and related plan for additional information.     Condition at Discharge: fair    See progress note 10/23 for discharge day encounter    Discussion with Family: Patient declined call to .      Administrative Statements   Discharge Statement:  I have spent a total time of 30 minutes in caring for this patient on the day of the visit/encounter. >30 minutes of time was spent on: Documenting in the medical record.    **Please Note: This note may have been constructed using a voice recognition system.**

## 2024-10-24 ENCOUNTER — ANESTHESIA (INPATIENT)
Dept: PERIOP | Facility: HOSPITAL | Age: 54
DRG: 231 | End: 2024-10-24
Payer: COMMERCIAL

## 2024-10-24 ENCOUNTER — APPOINTMENT (OUTPATIENT)
Dept: PERIOP | Facility: HOSPITAL | Age: 54
DRG: 231 | End: 2024-10-24
Payer: COMMERCIAL

## 2024-10-24 ENCOUNTER — APPOINTMENT (INPATIENT)
Dept: RADIOLOGY | Facility: HOSPITAL | Age: 54
DRG: 231 | End: 2024-10-24
Payer: COMMERCIAL

## 2024-10-24 ENCOUNTER — ANESTHESIA EVENT (INPATIENT)
Dept: PERIOP | Facility: HOSPITAL | Age: 54
DRG: 231 | End: 2024-10-24
Payer: COMMERCIAL

## 2024-10-24 LAB
ANION GAP SERPL CALCULATED.3IONS-SCNC: 9 MMOL/L (ref 4–13)
BASOPHILS # BLD AUTO: 0.02 THOUSANDS/ΜL (ref 0–0.1)
BASOPHILS NFR BLD AUTO: 0 % (ref 0–1)
BUN SERPL-MCNC: 25 MG/DL (ref 5–25)
CALCIUM SERPL-MCNC: 8.7 MG/DL (ref 8.4–10.2)
CHLORIDE SERPL-SCNC: 105 MMOL/L (ref 96–108)
CO2 SERPL-SCNC: 27 MMOL/L (ref 21–32)
CREAT SERPL-MCNC: 1.07 MG/DL (ref 0.6–1.3)
EOSINOPHIL # BLD AUTO: 0.05 THOUSAND/ΜL (ref 0–0.61)
EOSINOPHIL NFR BLD AUTO: 1 % (ref 0–6)
ERYTHROCYTE [DISTWIDTH] IN BLOOD BY AUTOMATED COUNT: 12.9 % (ref 11.6–15.1)
GFR SERPL CREATININE-BSD FRML MDRD: 77 ML/MIN/1.73SQ M
GLUCOSE SERPL-MCNC: 139 MG/DL (ref 65–140)
HCT VFR BLD AUTO: 44.9 % (ref 36.5–49.3)
HGB BLD-MCNC: 15.2 G/DL (ref 12–17)
IMM GRANULOCYTES # BLD AUTO: 0.01 THOUSAND/UL (ref 0–0.2)
IMM GRANULOCYTES NFR BLD AUTO: 0 % (ref 0–2)
LYMPHOCYTES # BLD AUTO: 0.86 THOUSANDS/ΜL (ref 0.6–4.47)
LYMPHOCYTES NFR BLD AUTO: 10 % (ref 14–44)
MCH RBC QN AUTO: 30.7 PG (ref 26.8–34.3)
MCHC RBC AUTO-ENTMCNC: 33.9 G/DL (ref 31.4–37.4)
MCV RBC AUTO: 91 FL (ref 82–98)
MONOCYTES # BLD AUTO: 1.41 THOUSAND/ΜL (ref 0.17–1.22)
MONOCYTES NFR BLD AUTO: 16 % (ref 4–12)
NEUTROPHILS # BLD AUTO: 6.61 THOUSANDS/ΜL (ref 1.85–7.62)
NEUTS SEG NFR BLD AUTO: 73 % (ref 43–75)
NRBC BLD AUTO-RTO: 0 /100 WBCS
PLATELET # BLD AUTO: 317 THOUSANDS/UL (ref 149–390)
PMV BLD AUTO: 9.4 FL (ref 8.9–12.7)
POTASSIUM SERPL-SCNC: 3.8 MMOL/L (ref 3.5–5.3)
RBC # BLD AUTO: 4.95 MILLION/UL (ref 3.88–5.62)
SODIUM SERPL-SCNC: 141 MMOL/L (ref 135–147)
WBC # BLD AUTO: 8.96 THOUSAND/UL (ref 4.31–10.16)

## 2024-10-24 PROCEDURE — 45331 SIGMOIDOSCOPY AND BIOPSY: CPT | Performed by: INTERNAL MEDICINE

## 2024-10-24 PROCEDURE — C1769 GUIDE WIRE: HCPCS

## 2024-10-24 PROCEDURE — 88305 TISSUE EXAM BY PATHOLOGIST: CPT | Performed by: SPECIALIST

## 2024-10-24 PROCEDURE — 99223 1ST HOSP IP/OBS HIGH 75: CPT | Performed by: COLON & RECTAL SURGERY

## 2024-10-24 PROCEDURE — 88341 IMHCHEM/IMCYTCHM EA ADD ANTB: CPT | Performed by: SPECIALIST

## 2024-10-24 PROCEDURE — 74018 RADEX ABDOMEN 1 VIEW: CPT

## 2024-10-24 PROCEDURE — 88342 IMHCHEM/IMCYTCHM 1ST ANTB: CPT | Performed by: SPECIALIST

## 2024-10-24 PROCEDURE — 0DBN8ZX EXCISION OF SIGMOID COLON, VIA NATURAL OR ARTIFICIAL OPENING ENDOSCOPIC, DIAGNOSTIC: ICD-10-PCS | Performed by: INTERNAL MEDICINE

## 2024-10-24 PROCEDURE — 80048 BASIC METABOLIC PNL TOTAL CA: CPT

## 2024-10-24 PROCEDURE — 0D7N8DZ DILATION OF SIGMOID COLON WITH INTRALUMINAL DEVICE, VIA NATURAL OR ARTIFICIAL OPENING ENDOSCOPIC: ICD-10-PCS | Performed by: INTERNAL MEDICINE

## 2024-10-24 PROCEDURE — 85025 COMPLETE CBC W/AUTO DIFF WBC: CPT

## 2024-10-24 PROCEDURE — C1876 STENT, NON-COA/NON-COV W/DEL: HCPCS

## 2024-10-24 PROCEDURE — 99254 IP/OBS CNSLTJ NEW/EST MOD 60: CPT | Performed by: INTERNAL MEDICINE

## 2024-10-24 PROCEDURE — 45347 SIGMOIDOSCOPY W/PLCMT STENT: CPT | Performed by: INTERNAL MEDICINE

## 2024-10-24 RX ORDER — SODIUM CHLORIDE, SODIUM LACTATE, POTASSIUM CHLORIDE, CALCIUM CHLORIDE 600; 310; 30; 20 MG/100ML; MG/100ML; MG/100ML; MG/100ML
100 INJECTION, SOLUTION INTRAVENOUS CONTINUOUS
Status: DISCONTINUED | OUTPATIENT
Start: 2024-10-24 | End: 2024-10-25

## 2024-10-24 RX ORDER — FENTANYL CITRATE/PF 50 MCG/ML
50 SYRINGE (ML) INJECTION
Status: DISCONTINUED | OUTPATIENT
Start: 2024-10-24 | End: 2024-10-24 | Stop reason: HOSPADM

## 2024-10-24 RX ORDER — SUCCINYLCHOLINE/SOD CL,ISO/PF 100 MG/5ML
SYRINGE (ML) INTRAVENOUS AS NEEDED
Status: DISCONTINUED | OUTPATIENT
Start: 2024-10-24 | End: 2024-10-24

## 2024-10-24 RX ORDER — METOCLOPRAMIDE HYDROCHLORIDE 5 MG/ML
10 INJECTION INTRAMUSCULAR; INTRAVENOUS ONCE AS NEEDED
Status: DISCONTINUED | OUTPATIENT
Start: 2024-10-24 | End: 2024-10-24 | Stop reason: HOSPADM

## 2024-10-24 RX ORDER — ONDANSETRON 2 MG/ML
INJECTION INTRAMUSCULAR; INTRAVENOUS AS NEEDED
Status: DISCONTINUED | OUTPATIENT
Start: 2024-10-24 | End: 2024-10-24

## 2024-10-24 RX ORDER — POTASSIUM CHLORIDE 14.9 MG/ML
20 INJECTION INTRAVENOUS ONCE
Status: COMPLETED | OUTPATIENT
Start: 2024-10-24 | End: 2024-10-24

## 2024-10-24 RX ORDER — PROPOFOL 10 MG/ML
INJECTION, EMULSION INTRAVENOUS AS NEEDED
Status: DISCONTINUED | OUTPATIENT
Start: 2024-10-24 | End: 2024-10-24

## 2024-10-24 RX ORDER — SODIUM CHLORIDE, SODIUM LACTATE, POTASSIUM CHLORIDE, CALCIUM CHLORIDE 600; 310; 30; 20 MG/100ML; MG/100ML; MG/100ML; MG/100ML
INJECTION, SOLUTION INTRAVENOUS CONTINUOUS PRN
Status: DISCONTINUED | OUTPATIENT
Start: 2024-10-24 | End: 2024-10-24

## 2024-10-24 RX ORDER — LIDOCAINE HYDROCHLORIDE 10 MG/ML
INJECTION, SOLUTION EPIDURAL; INFILTRATION; INTRACAUDAL; PERINEURAL AS NEEDED
Status: DISCONTINUED | OUTPATIENT
Start: 2024-10-24 | End: 2024-10-24

## 2024-10-24 RX ADMIN — POTASSIUM CHLORIDE 20 MEQ: 14.9 INJECTION, SOLUTION INTRAVENOUS at 11:05

## 2024-10-24 RX ADMIN — SODIUM CHLORIDE, SODIUM LACTATE, POTASSIUM CHLORIDE, AND CALCIUM CHLORIDE: .6; .31; .03; .02 INJECTION, SOLUTION INTRAVENOUS at 17:42

## 2024-10-24 RX ADMIN — ONDANSETRON 4 MG: 2 INJECTION INTRAMUSCULAR; INTRAVENOUS at 18:14

## 2024-10-24 RX ADMIN — Medication 100 MG: at 18:01

## 2024-10-24 RX ADMIN — HEPARIN SODIUM 5000 UNITS: 5000 INJECTION INTRAVENOUS; SUBCUTANEOUS at 06:27

## 2024-10-24 RX ADMIN — PROPOFOL 200 MG: 10 INJECTION, EMULSION INTRAVENOUS at 18:00

## 2024-10-24 RX ADMIN — HEPARIN SODIUM 5000 UNITS: 5000 INJECTION INTRAVENOUS; SUBCUTANEOUS at 14:25

## 2024-10-24 RX ADMIN — DEXTROSE, SODIUM CHLORIDE, AND POTASSIUM CHLORIDE 125 ML/HR: 5; .45; .15 INJECTION INTRAVENOUS at 11:05

## 2024-10-24 RX ADMIN — DEXTROSE, SODIUM CHLORIDE, AND POTASSIUM CHLORIDE 125 ML/HR: 5; .45; .15 INJECTION INTRAVENOUS at 02:35

## 2024-10-24 RX ADMIN — HEPARIN SODIUM 5000 UNITS: 5000 INJECTION INTRAVENOUS; SUBCUTANEOUS at 22:47

## 2024-10-24 RX ADMIN — LIDOCAINE HYDROCHLORIDE 50 MG: 10 INJECTION, SOLUTION EPIDURAL; INFILTRATION; INTRACAUDAL; PERINEURAL at 18:00

## 2024-10-24 RX ADMIN — DEXTROSE, SODIUM CHLORIDE, AND POTASSIUM CHLORIDE 125 ML/HR: 5; .45; .15 INJECTION INTRAVENOUS at 22:47

## 2024-10-24 RX ADMIN — IOHEXOL 10 ML: 240 INJECTION, SOLUTION INTRATHECAL; INTRAVASCULAR; INTRAVENOUS; ORAL at 18:32

## 2024-10-24 NOTE — CONSULTS
Consultation -  [facility]  Darin Bullock 54 y.o. male MRN: [id number]  [facility] Encounter: [id number]            Inpatient consult to gastroenterology     Date/Time  10/24/2024 12:42 PM     Performed by  Chris Pruitt MD   Authorized by  Aleida Sharma PA-C             Inpatient consult to gastroenterology     Date/Time  10/24/2024 12:42 PM     Performed by  Gabriel Olivarez PA-C   Authorized by  Sulma Mott PA-C             Reason for Consult / Principal Problem:   Large bowel obstruction      ASSESSMENT AND PLAN:    53 yo M with history of GERD who presented to [facility] on 10/21 due to progressively worsening constipation found to have CT abdomen showing large amount of stool distending colon with decompressed sigmoid colon with narrow area of transition overall concerning for constipation due to near obstructing colon mass versus NSAID induced stricture versus less likely IBD leading to GI consult for colonoscopy and stent placement.    1.  Constipation  2.  Sigmoid colon narrowing  - Keep patient n.p.o.  - Administer 2 tapwater enemas  - Plan for colonoscopy today for possible sigmoid stent placement  - Colorectal surgery following    ______________________________________________________________________    HPI:  Darin Bullock is our 53 yo M with history of GERD who presented to [facility] on 10/21 due to progressively worsening constipation.  He presented to [place] earlier that morning due to constipation for the past 2 weeks that came on suddenly.  He was discharged and followed up with gastroenterology in the office later that day who instructed him to return to the ED for imaging given concern for bowel obstruction.  He received bowel regimen including GoLytely while there but had episode of emesis.  He has had 2 small bowel movements the past week.  He was seen by colorectal surgery who recommended transfer to [facility] for consideration of  colonoscopy with stent placement given concern for sigmoid mass with proximal obstruction.  He was transferred on 10/23 to [facility].  He reports symptoms came on suddenly and before this he was feeling well.  He has lost a few pounds, but contributes this to switching to a vegetarian diet.  He has been taken ibuprofen, 2 pills daily for several months now.  Denies family history of colorectal cancer.  Denies melena, hematochezia.  Denies abdominal pain, nausea, vomiting.  No family history of Crohn disease or ulcerative colitis.  No previous endoscopic evaluation.      REVIEW OF SYSTEMS:    CONSTITUTIONAL: Denies any fever, chills, rigors  HEENT: No earache or tinnitus. Denies hearing loss or visual disturbances.  CARDIOVASCULAR: No chest pain or palpitations.   RESPIRATORY: Denies any cough, hemoptysis, shortness of breath or dyspnea on exertion.  GASTROINTESTINAL: As noted in the History of Present Illness.   GENITOURINARY: No problems with urination. Denies any hematuria or dysuria.  NEUROLOGIC: No dizziness or vertigo, denies headaches.   MUSCULOSKELETAL: Denies any muscle or joint pain.   SKIN: Denies skin rashes or itching.   ENDOCRINE: Denies excessive thirst. Denies intolerance to heat or cold.  PSYCHOSOCIAL: Denies depression or anxiety. Denies any recent memory loss.       Historical Information   History reviewed. No pertinent past medical history.  History reviewed. No pertinent surgical history.  Social History   Social History     Substance and Sexual Activity   Alcohol Use Never     Social History     Substance and Sexual Activity   Drug Use Never     Social History     Tobacco Use   Smoking Status Never    Passive exposure: Never   Smokeless Tobacco Never     History reviewed. No pertinent family history.    Meds/Allergies       Medications Prior to Admission:     acetaminophen (TYLENOL) 650 mg CR tablet    ibuprofen (MOTRIN) 200 mg tablet    omeprazole (PriLOSEC) 40 MG capsule    polyethylene glycol  "(MIRALAX) 17 g packet    Current Facility-Administered Medications:     dextrose 5 % and sodium chloride 0.45 % with KCl 20 mEq/L infusion, Continuous, Last Rate: 125 mL/hr (10/24/24 1105)    heparin (porcine) subcutaneous injection 5,000 Units, Q8H VANDANA **AND** [CANCELED] Platelet count, Once    hydrALAZINE (APRESOLINE) injection 10 mg, Q8H PRN    HYDROmorphone (DILAUDID) injection 0.5 mg, Q4H PRN    HYDROmorphone HCl (DILAUDID) injection 0.2 mg, Q4H PRN    ondansetron (ZOFRAN) injection 4 mg, Q4H PRN    pantoprazole (PROTONIX) injection 40 mg, Q24H VANDANA    potassium chloride 20 mEq IVPB (premix), Once, Last Rate: 20 mEq (10/24/24 1105)    Allergies   Allergen Reactions    Pollen Extract Allergic Rhinitis           Objective     Blood pressure (!) 146/108, pulse 81, temperature 98.1 °F (36.7 °C), resp. rate 16, height 5' 10\" (1.778 m), weight 84.9 kg (187 lb 2.7 oz), SpO2 95%. Body mass index is 26.86 kg/m².      Intake/Output Summary (Last 24 hours) at 10/24/2024 1231  Last data filed at 10/24/2024 0631  Gross per 24 hour   Intake 1349.59 ml   Output 200 ml   Net 1149.59 ml         PHYSICAL EXAM:      General Appearance:   Alert, cooperative, no distress, long hair   HEENT:   Normocephalic, atraumatic, anicteric.     Neck:  Supple, symmetrical, trachea midline   Lungs:   No respiratory distress   Heart::   Regular rate and rhythm per monitor   Abdomen:   Soft, non-tender, non-distended; normal bowel sounds; no masses, no organomegaly    Genitalia:   Deferred    Rectal:   Deferred    Extremities:  No cyanosis, clubbing or edema    Pulses:  Pulses present   Skin:  No jaundice, rashes, or lesions    Lymph nodes:  No palpable cervical lymphadenopathy        Lab Results:   Admission on 10/23/2024   Component Date Value    Sodium 10/24/2024 141     Potassium 10/24/2024 3.8     Chloride 10/24/2024 105     CO2 10/24/2024 27     ANION GAP 10/24/2024 9     BUN 10/24/2024 25     Creatinine 10/24/2024 1.07     Glucose " 10/24/2024 139     Calcium 10/24/2024 8.7     eGFR 10/24/2024 77     WBC 10/24/2024 8.96     RBC 10/24/2024 4.95     Hemoglobin 10/24/2024 15.2     Hematocrit 10/24/2024 44.9     MCV 10/24/2024 91     MCH 10/24/2024 30.7     MCHC 10/24/2024 33.9     RDW 10/24/2024 12.9     MPV 10/24/2024 9.4     Platelets 10/24/2024 317     nRBC 10/24/2024 0     Segmented % 10/24/2024 73     Immature Grans % 10/24/2024 0     Lymphocytes % 10/24/2024 10 (L)     Monocytes % 10/24/2024 16 (H)     Eosinophils Relative 10/24/2024 1     Basophils Relative 10/24/2024 0     Absolute Neutrophils 10/24/2024 6.61     Absolute Immature Grans 10/24/2024 0.01     Absolute Lymphocytes 10/24/2024 0.86     Absolute Monocytes 10/24/2024 1.41 (H)     Eosinophils Absolute 10/24/2024 0.05     Basophils Absolute 10/24/2024 0.02        Imaging Studies: I have personally reviewed pertinent imaging studies.

## 2024-10-24 NOTE — UTILIZATION REVIEW
Initial Clinical Review    Admission: Date/Time/Statement:   Admission Orders (From admission, onward)       Ordered        10/23/24 1918  Inpatient Admission  Once                          Orders Placed This Encounter   Procedures    Inpatient Admission     Standing Status:   Standing     Number of Occurrences:   1     Order Specific Question:   Level of Care     Answer:   Med Surg [16]     Order Specific Question:   Estimated length of stay     Answer:   More than 2 Midnights     Order Specific Question:   Certification     Answer:   I certify that inpatient services are medically necessary for this patient for a duration of greater than two midnights. See H&P and MD Progress Notes for additional information about the patient's course of treatment.     Initial Presentation: 54 y.o. male with no significant PMHx, who presented initially to [facility] on 10/21/24 then transferred to [facility] on 10/23/24, admitted Inpatient status dt Constipation with obstruction.  Presented due to one week of constipation & increasing abdominal distention. On 10/21 initially presented to [facility], received GoLytely for constipation & discharged. He then went to [facility] ED for nausea and vomiting following consumption of GoLytely. In [place] ED, CTAP showed large volume stool and gas throughout colon, decompressed sigmoid, with partially obstructing apple core lesion in descending/sigmoid colon junction. Admitted IP to [facility] then transferred to [facility] on 10/23/24 for further eval by Colorectal Sx.     Plan:  Admit to med surg :  found to have partially obstructing descending/sigmoid colon apple core lesion. Keep NPO, give IVF, GI consult for colonic stenting. Plan for L colectomy some time afterwards.     Date: 10/24   Day 2: Per GI: Sigmoid colon narrowing, keep NPO, Administer 2 tapwater enemas. Plan for colonoscopy today for possible sigmoid stent placement. Colorectal surgery  following.     Scheduled Medications:  heparin (porcine), 5,000 Units, Subcutaneous, Q8H VANDANA  pantoprazole, 40 mg, Intravenous, Q24H VANDANA  potassium chloride, 20 mEq, Intravenous, Once      Continuous IV Infusions:  dextrose 5 % and sodium chloride 0.45 % with KCl 20 mEq/L, 125 mL/hr, Intravenous, Continuous      PRN Meds:  hydrALAZINE, 10 mg, Intravenous, Q8H PRN  HYDROmorphone, 0.5 mg, Intravenous, Q4H PRN  HYDROmorphone, 0.2 mg, Intravenous, Q4H PRN  ondansetron, 4 mg, Intravenous, Q4H PRN      Triage Vitals   Temperature Pulse Respirations Blood Pressure SpO2 Pain Score   10/23/24 1847 10/23/24 1847 10/23/24 1847 10/23/24 1847 10/23/24 1847 10/23/24 1859   98.1 °F (36.7 °C) 94 18 (!) 144/111 94 % No Pain     Weight (last 2 days)       Date/Time Weight    10/23/24 1859 84.9 (187.17)            Vital Signs (last 3 days)       Date/Time Temp Pulse Resp BP MAP (mmHg) SpO2 O2 Device Anshu Coma Scale Score Pain    10/24/24 08:07:27 98.1 °F (36.7 °C) 81 16 146/108 121 95 % None (Room air) 15 --    10/23/24 22:33:22 98.8 °F (37.1 °C) 92 18 159/112 128 93 % -- -- --    10/23/24 19:43:28 -- 92 -- 155/111 126 94 % -- -- 3    10/23/24 1903 -- -- -- -- -- -- -- 15 --    10/23/24 1859 -- -- -- -- -- -- -- -- No Pain    10/23/24 18:47:08 98.1 °F (36.7 °C) 94 18 144/111 122 94 % -- -- --              Pertinent Labs/Diagnostic Test Results:   Radiology:  No orders to display     Cardiology:  No orders to display     GI:  No orders to display           Results from last 7 days   Lab Units 10/24/24  0626 10/23/24  0430 10/22/24  0510 10/21/24  1610   WBC Thousand/uL 8.96 8.11 10.59* 14.92*   HEMOGLOBIN g/dL 15.2 14.6 14.9 16.6   HEMATOCRIT % 44.9 43.6 44.0 47.9   PLATELETS Thousands/uL 317 306 347 397*   TOTAL NEUT ABS Thousands/µL 6.61  --  8.34* 12.79*         Results from last 7 days   Lab Units 10/24/24  0626 10/23/24  0430 10/22/24  0715 10/22/24  0510 10/21/24  1610   SODIUM mmol/L 141 139  --  138 139   POTASSIUM mmol/L  3.8 3.2*  --  3.5 3.2*   CHLORIDE mmol/L 105 103  --  105 99   CO2 mmol/L 27 24  --  18* 26   ANION GAP mmol/L 9 12  --  15* 14*   BUN mg/dL 25 27*  --  25 21   CREATININE mg/dL 1.07 1.29  --  1.31* 1.55*   EGFR ml/min/1.73sq m 77 61  --  60 49   CALCIUM mg/dL 8.7 9.1  --  8.7 9.9   MAGNESIUM mg/dL  --   --  2.4  --  2.6   PHOSPHORUS mg/dL  --  3.5  --   --   --      Results from last 7 days   Lab Units 10/21/24  1610   AST U/L 17   ALT U/L 13   ALK PHOS U/L 64   TOTAL PROTEIN g/dL 8.4   ALBUMIN g/dL 4.9   TOTAL BILIRUBIN mg/dL 0.90         Results from last 7 days   Lab Units 10/24/24  0626 10/23/24  0430 10/22/24  0510 10/21/24  1610   GLUCOSE RANDOM mg/dL 139 129 122 128     Beta- Hydroxybutyrate   Date Value Ref Range Status   10/22/2024 1.64 (H) 0.02 - 0.27 mmol/L Final          Results from last 7 days   Lab Units 10/22/24  0851   PH IRVIN  7.389   PCO2 IRVIN mm Hg 37.5*   PO2 IRVIN mm Hg 60.9*   HCO3 IRVIN mmol/L 22.1*   BASE EXC IRVIN mmol/L -2.3   O2 CONTENT IRVIN ml/dL 19.3   O2 HGB, VENOUS % 88.6*     Results from last 7 days   Lab Units 10/22/24  0510   TSH 3RD GENERATON uIU/mL 0.761     Results from last 7 days   Lab Units 10/22/24  0510 10/21/24  1610   PROCALCITONIN ng/ml 0.07 <0.05     Results from last 7 days   Lab Units 10/22/24  0715   LACTIC ACID mmol/L 1.1       Results from last 7 days   Lab Units 10/23/24  0929   CEA ng/mL 13.4*         Results from last 7 days   Lab Units 10/22/24  0224   CLARITY UA  Clear   COLOR UA  Yellow   SPEC GRAV UA  >=1.050*   PH UA  5.5   GLUCOSE UA mg/dl Negative   KETONES UA mg/dl 60 (2+)*   BLOOD UA  Negative   PROTEIN UA mg/dl 50 (1+)*   NITRITE UA  Negative   BILIRUBIN UA  Negative   UROBILINOGEN UA (BE) mg/dl <2.0   LEUKOCYTES UA  Negative   WBC UA /hpf None Seen   RBC UA /hpf 1-2   BACTERIA UA /hpf None Seen   EPITHELIAL CELLS WET PREP /hpf None Seen   MUCUS THREADS  Innumerable*     History reviewed. No pertinent past medical history.  Present on  Admission:  **None**      Admitting Diagnosis: Constipation [K59.00]  Age/Sex: 54 y.o. male    Network Utilization Review Department  ATTENTION: Please call with any questions or concerns to [phone] and carefully listen to the prompts so that you are directed to the right person. All voicemails are confidential.   For Discharge needs, contact Care Management DC Support Team at [phone] opt. 2  Send all requests for admission clinical reviews, approved or denied determinations and any other requests to dedicated fax number below belonging to the [place] where the patient is receiving treatment. List of dedicated fax numbers for the Facilities:  FACILITY NAME UR FAX NUMBER   ADMISSION DENIALS (Administrative/Medical Necessity) [phone]   DISCHARGE SUPPORT TEAM (NETWORK) [phone]   PARENT CHILD HEALTH (Maternity/NICU/Pediatrics) [phone]   [facility] [phone]   [facility] [phone]   [facility] [phone]   [facility] [phone]   [facility] [phone]   [facility] [phone]   [facility] [phone]   [facility] [phone]   [facility] [phone]   [facility] [phone]   [facility] [phone]   [facility] [phone]

## 2024-10-24 NOTE — ANESTHESIA PREPROCEDURE EVALUATION
Procedure:  COLONOSCOPY    Relevant Problems   GI/HEPATIC   (+) GERD (gastroesophageal reflux disease)      Other   (+) Prolonged Q-T interval on ECG        Physical Exam    Airway    Mallampati score: II  TM Distance: >3 FB  Neck ROM: full     Dental       Cardiovascular      Pulmonary      Other Findings        Anesthesia Plan  ASA Score- 3     Anesthesia Type- general with ASA Monitors.         Additional Monitors:     Airway Plan: ETT.           Plan Factors-    Chart reviewed.                      Induction- intravenous.    Postoperative Plan-     Perioperative Resuscitation Plan - Level 1 - Full Code.       Informed Consent- Anesthetic plan and risks discussed with patient.  I personally reviewed this patient with the CRNA. Discussed and agreed on the Anesthesia Plan with the CRNA..

## 2024-10-24 NOTE — UTILIZATION REVIEW
NOTIFICATION OF ADMISSION DISCHARGE   This is a Notification of Discharge from [facility]. Please be advised that this patient has been discharge from our facility. Below you will find the admission and discharge date and time including the patient’s disposition.   UTILIZATION REVIEW CONTACT:  Lois Falk  Utilization   Network Utilization Review Department  Phone: [phone] x carefully listen to the prompts. All voicemails are confidential.  Email: [email or web address]     ADMISSION INFORMATION  PRESENTATION DATE: 10/21/2024  3:34 PM  OBERVATION ADMISSION DATE: N/A  INPATIENT ADMISSION DATE: 10/21/24  8:52 PM   DISCHARGE DATE: 10/23/2024  6:23 PM   DISPOSITION:[facility] Utilization Review Department  ATTENTION: Please call with any questions or concerns to [phone] and carefully listen to the prompts so that you are directed to the right person. All voicemails are confidential.   For Discharge needs, contact Care Management DC Support Team at [phone] opt. 2  Send all requests for admission clinical reviews, approved or denied determinations and any other requests to dedicated fax number below belonging to the campus where the patient is receiving treatment. List of dedicated fax numbers for the Facilities:  FACILITY NAME UR FAX NUMBER   ADMISSION DENIALS (Administrative/Medical Necessity) [phone]   DISCHARGE SUPPORT TEAM ([facility]) [phone]   PARENT CHILD HEALTH (Maternity/NICU/Pediatrics) [phone]   [facility] [phone]   [facility] [phone]   [facility] [phone]   [facility] [phone]   [facility] [phone]   [facility] [phone]   General acute hospital [phone]   [facility]  [phone]   [facility] [phone]   [facility] [phone]   [facility] [phone]   [facility] [phone]

## 2024-10-24 NOTE — CASE MANAGEMENT
Case Management Assessment & Discharge Planning Note    Patient name Darin Bullock  Location [facility] 834/[facility] 834-01 MRN [id number]  : 1970 Date 10/24/2024       Current Admission Date: 10/23/2024  Current Admission Diagnosis:Constipation   Patient Active Problem List    Diagnosis Date Noted Date Diagnosed    GERD (gastroesophageal reflux disease) 10/23/2024     Constipation 10/21/2024     SIRS (systemic inflammatory response syndrome) (HCC) 10/21/2024     Elevated serum creatinine 10/21/2024     Nausea and vomiting 10/21/2024     Prolonged Q-T interval on ECG 10/21/2024       LOS (days): 1  Geometric Mean LOS (GMLOS) (days): 2.5  Days to GMLOS:1.6     OBJECTIVE:    Risk of Unplanned Readmission Score: 7.42         Current admission status: Inpatient       Preferred Pharmacy:   [id number] - [place] - [address]  [facility] [address]  Phone: [phone] Fax: [phone]    Primary Care Provider: No primary care provider on file.    Primary Insurance: [facility]  Secondary Insurance:     ASSESSMENT:  Active Health Care Proxies    There are no active Health Care Proxies on file.                 Readmission Root Cause  30 Day Readmission: No    Patient Information  Admitted from:: Home  Mental Status: Alert  During Assessment patient was accompanied by: Not accompanied during assessment  Assessment information provided by:: Patient  Primary Caregiver: Self  Support Systems: Spouse/significant other  County of Residence: [place]  What city do you live in?: [place]  Home entry access options. Select all that apply.: No steps to enter home  Type of Current Residence: 2 story home  Upon entering residence, is there a bedroom on the main floor (no further steps)?: No  A bedroom is located on the following floor levels of residence (select all that apply):: 2nd Floor  Upon entering residence, is there a bathroom on the main floor (no further steps)?:  No  Indicate which floors of current residence have a bathroom (select all the apply):: 2nd Floor  Number of steps to 2nd floor from main floor: One Flight  Living Arrangements: Lives w/ Spouse/significant other  Is patient a ?: No    Activities of Daily Living Prior to Admission  Functional Status: Independent  Completes ADLs independently?: Yes  Ambulates independently?: Yes  Does patient use assisted devices?: No  Does patient currently own DME?: No  Does patient have a history of Outpatient Therapy (PT/OT)?: No  Does the patient have a history of Short-Term Rehab?: No  Does patient have a history of HHC?: No  Does patient currently have HHC?: No         Patient Information Continued  Income Source: Employed  Does patient have prescription coverage?: Yes  Does patient receive dialysis treatments?: No  Does patient have a history of substance abuse?: No  Does patient have a history of Mental Health Diagnosis?: No         Means of Transportation  Means of Transport to Appts:: Drives Self          DISCHARGE DETAILS:    Discharge planning discussed with:: patient @ bedside  [place] of Choice: Yes     CM contacted family/caregiver?: No- see comments (pt AAOx3)  Were Treatment Team discharge recommendations reviewed with patient/caregiver?: Yes  Did patient/caregiver verbalize understanding of patient care needs?: Yes  Were patient/caregiver advised of the risks associated with not following Treatment Team discharge recommendations?: Yes    Contacts  Patient Contacts: Mary Ann Lizarraga  Relationship to Patient:: Family  Contact Method: Phone  Phone Number: [phone]  Reason/Outcome: Emergency Contact               CM met with patient at bedside to introduce self and role  Patient resides with his significant other in a 2SH, FFOS to 2nd floor bedroom/bathroom  Independent with all ADLS/ ambulation, does not own DME  No prior hx of OPPT, HHC or STR  Drives, but will have transport home  CM to follow for  needs    Patient/caregiver received discharge checklist.  Content reviewed.  Patient/caregiver encouraged to participate in discharge plan of care prior to discharge home.  CM reviewed d/c planning process including the following: identifying help at home, patient preference for d/c planning needs, Discharge Lounge, [facility] Meds to Bed program, availability of treatment team to discuss questions or concerns patient and/or family may have regarding understanding medications and recognizing signs and symptoms once discharged.  CM also encouraged patient to follow up with all recommended appointments after discharge. Patient advised of importance for patient and family to participate in managing patient’s medical well being.

## 2024-10-24 NOTE — ANESTHESIA POSTPROCEDURE EVALUATION
Post-Op Assessment Note    CV Status:  Stable  Pain Score: 0    Pain management: adequate       Mental Status:  Alert and awake   Hydration Status:  Euvolemic   PONV Controlled:  Controlled   Airway Patency:  Patent     Post Op Vitals Reviewed: Yes    No anethesia notable event occurred.    Staff: CRNA           Last Filed PACU Vitals:  Vitals Value Taken Time   Temp 99.4 °F (37.4 °C) 10/24/24 1903   Pulse 85 10/24/24 1903   /101 10/24/24 1903   Resp 15 10/24/24 1903   SpO2 95 % 10/24/24 1903       Modified Doron:  No data recorded

## 2024-10-24 NOTE — PLAN OF CARE
Problem: GASTROINTESTINAL - ADULT  Goal: Maintains or returns to baseline bowel function  Description: INTERVENTIONS:  - Assess bowel function  - Encourage oral fluids to ensure adequate hydration  - Administer IV fluids if ordered to ensure adequate hydration  - Administer ordered medications as needed  - Encourage mobilization and activity  - Consider nutritional services referral to assist patient with adequate nutrition and appropriate food choices  Outcome: Progressing  Goal: Maintains adequate nutritional intake  Description: INTERVENTIONS:  - Monitor percentage of each meal consumed  - Identify factors contributing to decreased intake, treat as appropriate  - Assist with meals as needed  - Monitor I&O, weight, and lab values if indicated  - Obtain nutrition services referral as needed  Outcome: Progressing     Problem: METABOLIC, FLUID AND ELECTROLYTES - ADULT  Goal: Electrolytes maintained within normal limits  Description: INTERVENTIONS:  - Monitor labs and assess patient for signs and symptoms of electrolyte imbalances  - Administer electrolyte replacement as ordered  - Monitor response to electrolyte replacements, including repeat lab results as appropriate  - Instruct patient on fluid and nutrition as appropriate  Outcome: Progressing

## 2024-10-25 PROBLEM — K56.609 BOWEL OBSTRUCTION (HCC): Status: ACTIVE | Noted: 2024-10-25

## 2024-10-25 LAB
ANION GAP SERPL CALCULATED.3IONS-SCNC: 6 MMOL/L (ref 4–13)
BASOPHILS # BLD AUTO: 0.02 THOUSANDS/ΜL (ref 0–0.1)
BASOPHILS NFR BLD AUTO: 0 % (ref 0–1)
BUN SERPL-MCNC: 17 MG/DL (ref 5–25)
CALCIUM SERPL-MCNC: 8 MG/DL (ref 8.4–10.2)
CHLORIDE SERPL-SCNC: 104 MMOL/L (ref 96–108)
CO2 SERPL-SCNC: 27 MMOL/L (ref 21–32)
CREAT SERPL-MCNC: 1.08 MG/DL (ref 0.6–1.3)
EOSINOPHIL # BLD AUTO: 0.08 THOUSAND/ΜL (ref 0–0.61)
EOSINOPHIL NFR BLD AUTO: 1 % (ref 0–6)
ERYTHROCYTE [DISTWIDTH] IN BLOOD BY AUTOMATED COUNT: 12.7 % (ref 11.6–15.1)
GFR SERPL CREATININE-BSD FRML MDRD: 76 ML/MIN/1.73SQ M
GLUCOSE SERPL-MCNC: 109 MG/DL (ref 65–140)
HCT VFR BLD AUTO: 40.3 % (ref 36.5–49.3)
HGB BLD-MCNC: 13.4 G/DL (ref 12–17)
IMM GRANULOCYTES # BLD AUTO: 0.02 THOUSAND/UL (ref 0–0.2)
IMM GRANULOCYTES NFR BLD AUTO: 0 % (ref 0–2)
LYMPHOCYTES # BLD AUTO: 1.1 THOUSANDS/ΜL (ref 0.6–4.47)
LYMPHOCYTES NFR BLD AUTO: 18 % (ref 14–44)
MCH RBC QN AUTO: 30.9 PG (ref 26.8–34.3)
MCHC RBC AUTO-ENTMCNC: 33.3 G/DL (ref 31.4–37.4)
MCV RBC AUTO: 93 FL (ref 82–98)
MONOCYTES # BLD AUTO: 0.83 THOUSAND/ΜL (ref 0.17–1.22)
MONOCYTES NFR BLD AUTO: 14 % (ref 4–12)
NEUTROPHILS # BLD AUTO: 4.01 THOUSANDS/ΜL (ref 1.85–7.62)
NEUTS SEG NFR BLD AUTO: 67 % (ref 43–75)
NRBC BLD AUTO-RTO: 0 /100 WBCS
PLATELET # BLD AUTO: 239 THOUSANDS/UL (ref 149–390)
PMV BLD AUTO: 9.4 FL (ref 8.9–12.7)
POTASSIUM SERPL-SCNC: 3.8 MMOL/L (ref 3.5–5.3)
RBC # BLD AUTO: 4.34 MILLION/UL (ref 3.88–5.62)
SODIUM SERPL-SCNC: 137 MMOL/L (ref 135–147)
WBC # BLD AUTO: 6.06 THOUSAND/UL (ref 4.31–10.16)

## 2024-10-25 PROCEDURE — 80048 BASIC METABOLIC PNL TOTAL CA: CPT | Performed by: PHYSICIAN ASSISTANT

## 2024-10-25 PROCEDURE — 99232 SBSQ HOSP IP/OBS MODERATE 35: CPT | Performed by: INTERNAL MEDICINE

## 2024-10-25 PROCEDURE — 85025 COMPLETE CBC W/AUTO DIFF WBC: CPT | Performed by: PHYSICIAN ASSISTANT

## 2024-10-25 PROCEDURE — 99232 SBSQ HOSP IP/OBS MODERATE 35: CPT | Performed by: SURGERY

## 2024-10-25 RX ORDER — ACETAMINOPHEN 325 MG/1
650 TABLET ORAL EVERY 6 HOURS SCHEDULED
Status: DISCONTINUED | OUTPATIENT
Start: 2024-10-25 | End: 2024-11-03 | Stop reason: HOSPADM

## 2024-10-25 RX ORDER — POLYETHYLENE GLYCOL 3350 17 G/17G
238 POWDER, FOR SOLUTION ORAL ONCE
Status: DISCONTINUED | OUTPATIENT
Start: 2024-10-26 | End: 2024-10-28

## 2024-10-25 RX ORDER — ACETAMINOPHEN 325 MG/1
650 TABLET ORAL EVERY 6 HOURS PRN
Status: DISCONTINUED | OUTPATIENT
Start: 2024-10-25 | End: 2024-10-25

## 2024-10-25 RX ORDER — POLYETHYLENE GLYCOL 3350 17 G/17G
238 POWDER, FOR SOLUTION ORAL ONCE
Status: DISCONTINUED | OUTPATIENT
Start: 2024-10-25 | End: 2024-10-28

## 2024-10-25 RX ORDER — POTASSIUM CHLORIDE 1500 MG/1
20 TABLET, EXTENDED RELEASE ORAL ONCE
Status: COMPLETED | OUTPATIENT
Start: 2024-10-25 | End: 2024-10-25

## 2024-10-25 RX ADMIN — PANTOPRAZOLE SODIUM 40 MG: 40 INJECTION, POWDER, FOR SOLUTION INTRAVENOUS at 10:43

## 2024-10-25 RX ADMIN — HEPARIN SODIUM 5000 UNITS: 5000 INJECTION INTRAVENOUS; SUBCUTANEOUS at 22:50

## 2024-10-25 RX ADMIN — POTASSIUM CHLORIDE 20 MEQ: 1500 TABLET, EXTENDED RELEASE ORAL at 10:43

## 2024-10-25 RX ADMIN — HEPARIN SODIUM 5000 UNITS: 5000 INJECTION INTRAVENOUS; SUBCUTANEOUS at 14:52

## 2024-10-25 RX ADMIN — HEPARIN SODIUM 5000 UNITS: 5000 INJECTION INTRAVENOUS; SUBCUTANEOUS at 05:54

## 2024-10-25 RX ADMIN — ACETAMINOPHEN 650 MG: 325 TABLET, FILM COATED ORAL at 20:11

## 2024-10-25 NOTE — ASSESSMENT & PLAN NOTE
Partial large bowel obstruction found in the descending sigmoid with apple core lesion now s/p sigmoid metal stent placed by GI, malignant appearing fungating invasive and ulcerated mass in the sigmoid colon.    -jesus d/w attending further plans for possible L colectomy  -NPO  -IVFs  -dvt ppx  -Rest of care per primary

## 2024-10-25 NOTE — PROGRESS NOTES
Progress Note - Colorectal   Name: Darin Bullock 54 y.o. male I MRN: [id number]  [facility] I Date of Admission: 10/23/2024   Date of Service: 10/26/2024 I Hospital Day: 3     Assessment & Plan  Bowel obstruction (HCC)  Partially obstructing apple-core lesion in descending/sigmoid colon now s/p 10/24 sigmoid metal stent placed by GI, malignant appearing fungating invasive and ulcerated mass in sigmoid colon.    Plan:  -discuss timing of possible L colectomy  -CLD, mIVF@84  -continue slow bowel prep  -DVT ppx    Subjective/Objective   NAOE. Pain controlled. Tolerating CLD, no n/v. Having watery brown BMs. Voiding. Walking.    Physical Exam:  General: NAD  CV: nl rate  Lungs: nl wob. No resp distress.  ABD: Soft, ND, NT  Extrem: No CCE      Patient Vitals for the past 24 hrs:   BP Temp Pulse Resp SpO2   10/26/24 0714 157/96 99.9 °F (37.7 °C) 98 17 94 %   10/25/24 2252 158/97 98.7 °F (37.1 °C) 100 16 93 %   10/25/24 2041 -- 100 °F (37.8 °C) (!) 112 -- 92 %   10/25/24 1940 -- -- -- -- 92 %   10/25/24 1504 (!) 159/101 98.2 °F (36.8 °C) 100 16 93 %       I/O         10/23 0701  10/24 0700 10/24 0701  10/25 0700 10/25 0701  10/26 0700    I.V. (mL/kg) 1349.6 (15.9) 2633.3 (31) 1102.1 (13)    Total Intake(mL/kg) 1349.6 (15.9) 2633.3 (31) 1102.1 (13)    Urine (mL/kg/hr) 200 660 (0.3)     Stool  0     Total Output 200 660     Net +1149.6 +1973.3 +1102.1           Unmeasured Stool Occurrence  3 x             Recent Labs     10/24/24  0626 10/25/24  0435 10/26/24  0524   WBC 8.96 6.06  --    HGB 15.2 13.4  --     239  --    SODIUM 141 137 134*   K 3.8 3.8 3.9    104 101   CO2 27 27 24   BUN 25 17 13   CREATININE 1.07 1.08 1.19   GLUC 139 109 116   CALCIUM 8.7 8.0* 8.4   EGFR 77 76 67     Lab Results   Component Value Date    LEUKOCYTESUR Negative 10/22/2024

## 2024-10-25 NOTE — PROGRESS NOTES
Progress Note - Surgery-General   Name: Darin Bullock 54 y.o. male I MRN: [id number]  Unit/Bed#: [facility] 834-01 I Date of Admission: 10/23/2024   Date of Service: 10/25/2024 I Hospital Day: 2    Assessment & Plan  Bowel obstruction (HCC)  Partial large bowel obstruction found in the descending sigmoid with apple core lesion now s/p sigmoid metal stent placed by GI, malignant appearing fungating invasive and ulcerated mass in the sigmoid colon.    -jesus d/w attending further plans for possible L colectomy  -NPO  -IVFs  -dvt ppx  -Rest of care per primary        Subjective   No acute events overnight.  Patient reports he is less distended.  Overnight he had 4 claylike stools.  His pain has also improved.    Objective :  Temp:  [97.5 °F (36.4 °C)-99.4 °F (37.4 °C)] 97.5 °F (36.4 °C)  HR:  [75-87] 87  BP: (146-165)/() 154/98  Resp:  [12-18] 12  SpO2:  [92 %-95 %] 93 %  O2 Device: None (Room air)    I/O         10/23 0701  10/24 0700 10/24 0701  10/25 0700    I.V. (mL/kg) 1349.6 (15.9) 2633.3 (31)    Total Intake(mL/kg) 1349.6 (15.9) 2633.3 (31)    Urine (mL/kg/hr) 200 660 (0.3)    Stool  0    Total Output 200 660    Net +1149.6 +1973.3          Unmeasured Stool Occurrence  3 x            Physical Exam  Physical Exam:  General: No acute distress, alert and oriented  CV: RRR  Lungs: Normal work of breathing   Abdomen: Soft nondistended, nontender.  Skin: Warm, dry      Lab Results: I have reviewed the following results:  Recent Labs     10/22/24  0715 10/23/24  0430 10/24/24  0626   WBC  --  8.11 8.96   HGB  --  14.6 15.2   HCT  --  43.6 44.9   PLT  --  306 317   SODIUM  --  139 141   K  --  3.2* 3.8   CL  --  103 105   CO2  --  24 27   BUN  --  27* 25   CREATININE  --  1.29 1.07   GLUC  --  129 139   MG 2.4  --   --    PHOS  --  3.5  --    LACTICACID 1.1  --   --              VTE Pharmacologic Prophylaxis: VTE covered by:  heparin (porcine), Subcutaneous, 5,000 Units at 10/24/24 7427     VTE Mechanical Prophylaxis:  sequential compression device

## 2024-10-25 NOTE — PROGRESS NOTES
Progress Note- Darin Bullock 54 y.o. male MRN: [id number]    [facility] Encounter: [id number]      Assessment and Plan:  53 yo M with history of GERD who presented to [facility] on 10/21 due to progressively worsening constipation found to have CT abdomen showing large amount of stool distending colon with decompressed sigmoid colon with narrow area of transition overall concerning for constipation due to near obstructing colon mass leading to colonoscopy on 10/24 with 22 [place] by 90 mm uncovered metal stent placed successfully with malignant appearing mass seen in sigmoid with biopsies taken.     1.  Constipation  2.  Sigmoid colon mass  - Follow-up biopsies  - Agree with bowel regiment  - Okay for clear liquid diet from GI standpoint  - Colorectal surgery following  ______________________________________________________________________    Subjective:  Patient feeling well overall.  No abdominal pain.  Having bowel movements.    Medication Administration - last 24 hours from 10/24/2024 1415 to 10/25/2024 1415         Date/Time Order Dose Route Action Action by     10/25/2024 0554 EDT heparin (porcine) subcutaneous injection 5,000 Units 5,000 Units Subcutaneous Given Dennis Huggins RN     10/24/2024 2247 EDT heparin (porcine) subcutaneous injection 5,000 Units 5,000 Units Subcutaneous Given Dennis Huggins RN     10/24/2024 1425 EDT heparin (porcine) subcutaneous injection 5,000 Units 5,000 Units Subcutaneous Given Domenica Otero RN     10/25/2024 1041 EDT dextrose 5 % and sodium chloride 0.45 % with KCl 20 mEq/L infusion 84 mL/hr Intravenous Rate/Dose Change Ayah Shah RN     10/25/2024 0738 EDT dextrose 5 % and sodium chloride 0.45 % with KCl 20 mEq/L infusion 125 mL/hr Intravenous Restarted Ayah Shah RN     10/25/2024 0736 EDT dextrose 5 % and sodium chloride 0.45 % with KCl 20 mEq/L infusion 0 mL/hr Intravenous Stopped Ayah Shah RN     10/24/2024 2247 EDT dextrose 5 % and  "sodium chloride 0.45 % with KCl 20 mEq/L infusion 125 mL/hr Intravenous New Bag Dennis Huggins RN     10/24/2024 2047 EDT dextrose 5 % and sodium chloride 0.45 % with KCl 20 mEq/L infusion 125 mL/hr Intravenous Restarted Dennis Huggins RN     10/25/2024 1043 EDT pantoprazole (PROTONIX) injection 40 mg 40 mg Intravenous Given Ayah Shah RN     10/24/2024 1832 EDT iohexol (OMNIPAQUE) 240 MG/ML solution 10 mL Other Given Collin Hutchins MD     10/24/2024 2038 EDT lactated ringers infusion -- Intravenous Canceled Entry Dennis Huggins RN     10/25/2024 1043 EDT potassium chloride (Klor-Con M20) CR tablet 20 mEq 20 mEq Oral Given Ayah Shah RN            Objective:     Vitals: Blood pressure 149/91, pulse 77, temperature 99.1 °F (37.3 °C), resp. rate 16, height 5' 10\" (1.778 m), weight 84.9 kg (187 lb 2.7 oz), SpO2 94%.,Body mass index is 26.86 kg/m².      Intake/Output Summary (Last 24 hours) at 10/25/2024 1415  Last data filed at 10/25/2024 0736  Gross per 24 hour   Intake 3735.41 ml   Output 660 ml   Net 3075.41 ml       Physical Exam:   General Appearance: Awake and alert, in no acute distress  Abdomen: Soft, non-tender, non-distended; bowel sounds normal; no masses or no organomegaly    Invasive Devices       Peripheral Intravenous Line  Duration             Peripheral IV 10/24/24 Right Hand <1 day                    Lab Results:  Admission on 10/23/2024   Component Date Value    Sodium 10/24/2024 141     Potassium 10/24/2024 3.8     Chloride 10/24/2024 105     CO2 10/24/2024 27     ANION GAP 10/24/2024 9     BUN 10/24/2024 25     Creatinine 10/24/2024 1.07     Glucose 10/24/2024 139     Calcium 10/24/2024 8.7     eGFR 10/24/2024 77     WBC 10/24/2024 8.96     RBC 10/24/2024 4.95     Hemoglobin 10/24/2024 15.2     Hematocrit 10/24/2024 44.9     MCV 10/24/2024 91     MCH 10/24/2024 30.7     MCHC 10/24/2024 33.9     RDW 10/24/2024 12.9     MPV 10/24/2024 9.4     Platelets 10/24/2024 317     nRBC 10/24/2024 0     " Segmented % 10/24/2024 73     Immature Grans % 10/24/2024 0     Lymphocytes % 10/24/2024 10 (L)     Monocytes % 10/24/2024 16 (H)     Eosinophils Relative 10/24/2024 1     Basophils Relative 10/24/2024 0     Absolute Neutrophils 10/24/2024 6.61     Absolute Immature Grans 10/24/2024 0.01     Absolute Lymphocytes 10/24/2024 0.86     Absolute Monocytes 10/24/2024 1.41 (H)     Eosinophils Absolute 10/24/2024 0.05     Basophils Absolute 10/24/2024 0.02     WBC 10/25/2024 6.06     RBC 10/25/2024 4.34     Hemoglobin 10/25/2024 13.4     Hematocrit 10/25/2024 40.3     MCV 10/25/2024 93     MCH 10/25/2024 30.9     MCHC 10/25/2024 33.3     RDW 10/25/2024 12.7     MPV 10/25/2024 9.4     Platelets 10/25/2024 239     nRBC 10/25/2024 0     Segmented % 10/25/2024 67     Immature Grans % 10/25/2024 0     Lymphocytes % 10/25/2024 18     Monocytes % 10/25/2024 14 (H)     Eosinophils Relative 10/25/2024 1     Basophils Relative 10/25/2024 0     Absolute Neutrophils 10/25/2024 4.01     Absolute Immature Grans 10/25/2024 0.02     Absolute Lymphocytes 10/25/2024 1.10     Absolute Monocytes 10/25/2024 0.83     Eosinophils Absolute 10/25/2024 0.08     Basophils Absolute 10/25/2024 0.02     Sodium 10/25/2024 137     Potassium 10/25/2024 3.8     Chloride 10/25/2024 104     CO2 10/25/2024 27     ANION GAP 10/25/2024 6     BUN 10/25/2024 17     Creatinine 10/25/2024 1.08     Glucose 10/25/2024 109     Calcium 10/25/2024 8.0 (L)     eGFR 10/25/2024 76        Imaging Studies: I have personally reviewed pertinent imaging studies.

## 2024-10-25 NOTE — ASSESSMENT & PLAN NOTE
Partially obstructing apple-core lesion in descending/sigmoid colon now s/p 10/24 sigmoid metal stent placed by GI, malignant appearing fungating invasive and ulcerated mass in sigmoid colon.    Plan:  -discuss timing of possible L colectomy  -CLD, mIVF@84  -continue slow bowel prep  -DVT ppx

## 2024-10-26 ENCOUNTER — APPOINTMENT (INPATIENT)
Dept: RADIOLOGY | Facility: HOSPITAL | Age: 54
DRG: 231 | End: 2024-10-26
Payer: COMMERCIAL

## 2024-10-26 LAB
ANION GAP SERPL CALCULATED.3IONS-SCNC: 9 MMOL/L (ref 4–13)
BUN SERPL-MCNC: 13 MG/DL (ref 5–25)
CALCIUM SERPL-MCNC: 8.4 MG/DL (ref 8.4–10.2)
CHLORIDE SERPL-SCNC: 101 MMOL/L (ref 96–108)
CO2 SERPL-SCNC: 24 MMOL/L (ref 21–32)
CREAT SERPL-MCNC: 1.19 MG/DL (ref 0.6–1.3)
GFR SERPL CREATININE-BSD FRML MDRD: 67 ML/MIN/1.73SQ M
GLUCOSE SERPL-MCNC: 116 MG/DL (ref 65–140)
POTASSIUM SERPL-SCNC: 3.9 MMOL/L (ref 3.5–5.3)
SODIUM SERPL-SCNC: 134 MMOL/L (ref 135–147)

## 2024-10-26 PROCEDURE — 99232 SBSQ HOSP IP/OBS MODERATE 35: CPT | Performed by: COLON & RECTAL SURGERY

## 2024-10-26 PROCEDURE — 80048 BASIC METABOLIC PNL TOTAL CA: CPT | Performed by: PHYSICIAN ASSISTANT

## 2024-10-26 PROCEDURE — 74018 RADEX ABDOMEN 1 VIEW: CPT

## 2024-10-26 RX ORDER — POTASSIUM CHLORIDE 1500 MG/1
20 TABLET, EXTENDED RELEASE ORAL ONCE
Status: COMPLETED | OUTPATIENT
Start: 2024-10-26 | End: 2024-10-26

## 2024-10-26 RX ADMIN — DEXTROSE, SODIUM CHLORIDE, AND POTASSIUM CHLORIDE 84 ML/HR: 5; .45; .15 INJECTION INTRAVENOUS at 08:27

## 2024-10-26 RX ADMIN — PANTOPRAZOLE SODIUM 40 MG: 40 INJECTION, POWDER, FOR SOLUTION INTRAVENOUS at 08:27

## 2024-10-26 RX ADMIN — HEPARIN SODIUM 5000 UNITS: 5000 INJECTION INTRAVENOUS; SUBCUTANEOUS at 05:17

## 2024-10-26 RX ADMIN — ACETAMINOPHEN 650 MG: 325 TABLET, FILM COATED ORAL at 12:13

## 2024-10-26 RX ADMIN — ACETAMINOPHEN 650 MG: 325 TABLET, FILM COATED ORAL at 17:22

## 2024-10-26 RX ADMIN — POTASSIUM CHLORIDE 20 MEQ: 1500 TABLET, EXTENDED RELEASE ORAL at 08:27

## 2024-10-26 RX ADMIN — HEPARIN SODIUM 5000 UNITS: 5000 INJECTION INTRAVENOUS; SUBCUTANEOUS at 15:29

## 2024-10-26 RX ADMIN — HEPARIN SODIUM 5000 UNITS: 5000 INJECTION INTRAVENOUS; SUBCUTANEOUS at 21:28

## 2024-10-27 LAB
ANION GAP SERPL CALCULATED.3IONS-SCNC: 10 MMOL/L (ref 4–13)
BUN SERPL-MCNC: 13 MG/DL (ref 5–25)
CALCIUM SERPL-MCNC: 8.6 MG/DL (ref 8.4–10.2)
CHLORIDE SERPL-SCNC: 104 MMOL/L (ref 96–108)
CO2 SERPL-SCNC: 23 MMOL/L (ref 21–32)
CREAT SERPL-MCNC: 0.99 MG/DL (ref 0.6–1.3)
GFR SERPL CREATININE-BSD FRML MDRD: 84 ML/MIN/1.73SQ M
GLUCOSE SERPL-MCNC: 94 MG/DL (ref 65–140)
POTASSIUM SERPL-SCNC: 3.9 MMOL/L (ref 3.5–5.3)
SODIUM SERPL-SCNC: 137 MMOL/L (ref 135–147)

## 2024-10-27 PROCEDURE — 80048 BASIC METABOLIC PNL TOTAL CA: CPT

## 2024-10-27 PROCEDURE — 99233 SBSQ HOSP IP/OBS HIGH 50: CPT | Performed by: COLON & RECTAL SURGERY

## 2024-10-27 RX ADMIN — HEPARIN SODIUM 5000 UNITS: 5000 INJECTION INTRAVENOUS; SUBCUTANEOUS at 05:39

## 2024-10-27 RX ADMIN — ACETAMINOPHEN 650 MG: 325 TABLET, FILM COATED ORAL at 23:23

## 2024-10-27 RX ADMIN — HEPARIN SODIUM 5000 UNITS: 5000 INJECTION INTRAVENOUS; SUBCUTANEOUS at 14:57

## 2024-10-27 RX ADMIN — PANTOPRAZOLE SODIUM 40 MG: 40 INJECTION, POWDER, FOR SOLUTION INTRAVENOUS at 08:26

## 2024-10-27 RX ADMIN — HEPARIN SODIUM 5000 UNITS: 5000 INJECTION INTRAVENOUS; SUBCUTANEOUS at 22:27

## 2024-10-27 NOTE — ASSESSMENT & PLAN NOTE
Partially obstructing apple-core lesion in descending/sigmoid colon now s/p 10/24 sigmoid metal stent placed by GI, malignant appearing fungating invasive and ulcerated mass in sigmoid colon.    Plan:  -plan for 10/29 L colectomy  -switch diet to CLD, bowel prep today  -DVT ppx, OOB/ambulate

## 2024-10-27 NOTE — PROGRESS NOTES
Progress Note - Colorectal   Name: Darin Bullock 54 y.o. male I MRN: [id number]  [facility] Date of Admission: 10/23/2024   Date of Service: 10/28/2024 I Hospital Day: 5     Assessment & Plan  Bowel obstruction (HCC)  Partially obstructing apple-core lesion in descending/sigmoid colon now s/p 10/24 sigmoid metal stent placed by GI, malignant appearing fungating invasive and ulcerated mass in sigmoid colon.    Plan:  -plan for 10/29 L colectomy  -switch diet to CLD, bowel prep today  -DVT ppx, OOB/ambulate    Subjective/Objective   NAOE. Pain controlled. Tolerating diet, no n/v. Having watery light brown BMs. Voiding. Walking.     Physical Exam:  General: NAD  CV: nl rate  Lungs: nl wob. No resp distress.  ABD: Soft, ND, NT  Extrem: No CCE  UOP unrecorded    Patient Vitals for the past 24 hrs:   BP Temp Temp src Pulse Resp SpO2   10/27/24 2229 140/96 (!) 97.4 °F (36.3 °C) Oral 86 18 94 %   10/27/24 1556 139/97 -- -- 90 -- 95 %   10/27/24 0706 148/100 97.9 °F (36.6 °C) -- 81 16 95 %       I/O         10/25 0701  10/26 0700 10/26 0701  10/27 0700 10/27 0701  10/28 0700    P.O. 240 420 298    I.V. (mL/kg) 2179.1 (25.7) 933.8 (11)     Total Intake(mL/kg) 2419.1 (28.5) 1353.8 (15.9) 298 (3.5)    Urine (mL/kg/hr) 400 (0.2) 400 (0.2)     Stool  0     Total Output 400 400     Net +2019.1 +953.8 +298           Unmeasured Urine Occurrence  2 x     Unmeasured Stool Occurrence  1 x             Recent Labs     10/27/24  0434 10/28/24  0444   SODIUM 137 138   K 3.9 3.6    103   CO2 23 25   BUN 13 15   CREATININE 0.99 1.07   GLUC 94 104   CALCIUM 8.6 8.4   MG  --  2.1   EGFR 84 77     Lab Results   Component Value Date    LEUKOCYTESUR Negative 10/22/2024

## 2024-10-27 NOTE — PROGRESS NOTES
Progress Note - Colorectal   Name: Darin Bullock 54 y.o. male I MRN: [id number]  Unit/Bed#: [facility] 834-01 I Date of Admission: 10/23/2024   Date of Service: 10/27/2024 I Hospital Day: 4     Assessment & Plan  Bowel obstruction (HCC)  Partially obstructing apple-core lesion in descending/sigmoid colon now s/p 10/24 sigmoid metal stent placed by GI, malignant appearing fungating invasive and ulcerated mass in sigmoid colon.    Plan:  -discuss timing of possible L colectomy  -regular diet  -DVT ppx    Subjective/Objective   NAOE. Pain controlled. Tolerating diet, no n/v. Having watery brown BMs. Voiding. Walking.     Physical Exam:  General: NAD  CV: nl rate  Lungs: nl wob. No resp distress.  ABD: Soft, ND, NT  Extrem: No CCE   + x2    Patient Vitals for the past 24 hrs:   BP Temp Pulse Resp SpO2   10/27/24 0706 148/100 97.9 °F (36.6 °C) 81 16 95 %   10/26/24 2235 150/100 98.9 °F (37.2 °C) 80 16 94 %   10/26/24 1428 148/100 98.3 °F (36.8 °C) 79 16 95 %       I/O         10/25 0701  10/26 0700 10/26 0701  10/27 0700 10/27 0701  10/28 0700    P.O. 240 420     I.V. (mL/kg) 2179.1 (25.7) 933.8 (11)     Total Intake(mL/kg) 2419.1 (28.5) 1353.8 (15.9)     Urine (mL/kg/hr) 400 (0.2) 400 (0.2)     Stool  0     Total Output 400 400     Net +2019.1 +953.8            Unmeasured Urine Occurrence  2 x     Unmeasured Stool Occurrence  1 x             Recent Labs     10/25/24  0435 10/26/24  0524 10/27/24  0434   WBC 6.06  --   --    HGB 13.4  --   --      --   --    SODIUM 137 134* 137   K 3.8 3.9 3.9    101 104   CO2 27 24 23   BUN 17 13 13   CREATININE 1.08 1.19 0.99   GLUC 109 116 94   CALCIUM 8.0* 8.4 8.6   EGFR 76 67 84     Lab Results   Component Value Date    LEUKOCYTESUR Negative 10/22/2024

## 2024-10-27 NOTE — ASSESSMENT & PLAN NOTE
Partially obstructing apple-core lesion in descending/sigmoid colon now s/p 10/24 sigmoid metal stent placed by GI, malignant appearing fungating invasive and ulcerated mass in sigmoid colon.    Plan:  -discuss timing of possible L colectomy  -regular diet  -DVT ppx

## 2024-10-28 LAB
ABO GROUP BLD: NORMAL
ANION GAP SERPL CALCULATED.3IONS-SCNC: 10 MMOL/L (ref 4–13)
BLD GP AB SCN SERPL QL: NEGATIVE
BUN SERPL-MCNC: 15 MG/DL (ref 5–25)
CALCIUM SERPL-MCNC: 8.4 MG/DL (ref 8.4–10.2)
CHLORIDE SERPL-SCNC: 103 MMOL/L (ref 96–108)
CO2 SERPL-SCNC: 25 MMOL/L (ref 21–32)
CREAT SERPL-MCNC: 1.07 MG/DL (ref 0.6–1.3)
GFR SERPL CREATININE-BSD FRML MDRD: 77 ML/MIN/1.73SQ M
GLUCOSE SERPL-MCNC: 104 MG/DL (ref 65–140)
MAGNESIUM SERPL-MCNC: 2.1 MG/DL (ref 1.9–2.7)
POTASSIUM SERPL-SCNC: 3.6 MMOL/L (ref 3.5–5.3)
RH BLD: POSITIVE
SODIUM SERPL-SCNC: 138 MMOL/L (ref 135–147)
SPECIMEN EXPIRATION DATE: NORMAL

## 2024-10-28 PROCEDURE — 99232 SBSQ HOSP IP/OBS MODERATE 35: CPT | Performed by: COLON & RECTAL SURGERY

## 2024-10-28 PROCEDURE — 86923 COMPATIBILITY TEST ELECTRIC: CPT

## 2024-10-28 PROCEDURE — 86901 BLOOD TYPING SEROLOGIC RH(D): CPT | Performed by: PHYSICIAN ASSISTANT

## 2024-10-28 PROCEDURE — 86900 BLOOD TYPING SEROLOGIC ABO: CPT | Performed by: PHYSICIAN ASSISTANT

## 2024-10-28 PROCEDURE — 86850 RBC ANTIBODY SCREEN: CPT | Performed by: PHYSICIAN ASSISTANT

## 2024-10-28 PROCEDURE — 83735 ASSAY OF MAGNESIUM: CPT

## 2024-10-28 PROCEDURE — 80048 BASIC METABOLIC PNL TOTAL CA: CPT

## 2024-10-28 RX ORDER — DEXTROSE MONOHYDRATE, SODIUM CHLORIDE, AND POTASSIUM CHLORIDE 50; 1.49; 4.5 G/1000ML; G/1000ML; G/1000ML
75 INJECTION, SOLUTION INTRAVENOUS CONTINUOUS
Status: DISCONTINUED | OUTPATIENT
Start: 2024-10-28 | End: 2024-10-29

## 2024-10-28 RX ORDER — BISACODYL 5 MG/1
10 TABLET, DELAYED RELEASE ORAL 2 TIMES DAILY
Status: COMPLETED | OUTPATIENT
Start: 2024-10-28 | End: 2024-10-28

## 2024-10-28 RX ORDER — POLYETHYLENE GLYCOL 3350 17 G/17G
238 POWDER, FOR SOLUTION ORAL ONCE
Status: COMPLETED | OUTPATIENT
Start: 2024-10-28 | End: 2024-10-28

## 2024-10-28 RX ORDER — NEOMYCIN SULFATE 500 MG/1
1000 TABLET ORAL 3 TIMES DAILY
Status: DISPENSED | OUTPATIENT
Start: 2024-10-28 | End: 2024-10-29

## 2024-10-28 RX ORDER — CEFAZOLIN SODIUM 2 G/50ML
2000 SOLUTION INTRAVENOUS
Status: DISCONTINUED | OUTPATIENT
Start: 2024-10-29 | End: 2024-10-29

## 2024-10-28 RX ORDER — POTASSIUM CHLORIDE 1500 MG/1
40 TABLET, EXTENDED RELEASE ORAL ONCE
Status: COMPLETED | OUTPATIENT
Start: 2024-10-28 | End: 2024-10-28

## 2024-10-28 RX ORDER — METRONIDAZOLE 500 MG/100ML
500 INJECTION, SOLUTION INTRAVENOUS
Status: DISCONTINUED | OUTPATIENT
Start: 2024-10-29 | End: 2024-10-29

## 2024-10-28 RX ORDER — METRONIDAZOLE 500 MG/1
500 TABLET ORAL 3 TIMES DAILY
Status: DISPENSED | OUTPATIENT
Start: 2024-10-28 | End: 2024-10-29

## 2024-10-28 RX ADMIN — POTASSIUM CHLORIDE 40 MEQ: 1500 TABLET, EXTENDED RELEASE ORAL at 09:26

## 2024-10-28 RX ADMIN — HEPARIN SODIUM 5000 UNITS: 5000 INJECTION INTRAVENOUS; SUBCUTANEOUS at 13:26

## 2024-10-28 RX ADMIN — ACETAMINOPHEN 650 MG: 325 TABLET, FILM COATED ORAL at 11:53

## 2024-10-28 RX ADMIN — DEXTROSE, SODIUM CHLORIDE, AND POTASSIUM CHLORIDE 75 ML/HR: 5; .45; .15 INJECTION INTRAVENOUS at 23:02

## 2024-10-28 RX ADMIN — METRONIDAZOLE 500 MG: 500 TABLET ORAL at 21:06

## 2024-10-28 RX ADMIN — POLYETHYLENE GLYCOL 3350 238 G: 17 POWDER, FOR SOLUTION ORAL at 11:54

## 2024-10-28 RX ADMIN — BISACODYL 10 MG: 5 TABLET, COATED ORAL at 17:28

## 2024-10-28 RX ADMIN — ACETAMINOPHEN 650 MG: 325 TABLET, FILM COATED ORAL at 23:01

## 2024-10-28 RX ADMIN — BISACODYL 10 MG: 5 TABLET, COATED ORAL at 11:53

## 2024-10-28 RX ADMIN — ACETAMINOPHEN 650 MG: 325 TABLET, FILM COATED ORAL at 17:29

## 2024-10-28 RX ADMIN — METRONIDAZOLE 500 MG: 500 TABLET ORAL at 13:26

## 2024-10-28 RX ADMIN — HEPARIN SODIUM 5000 UNITS: 5000 INJECTION INTRAVENOUS; SUBCUTANEOUS at 05:29

## 2024-10-28 RX ADMIN — HEPARIN SODIUM 5000 UNITS: 5000 INJECTION INTRAVENOUS; SUBCUTANEOUS at 21:05

## 2024-10-28 RX ADMIN — NEOMYCIN SULFATE 1000 MG: 500 TABLET ORAL at 21:06

## 2024-10-28 RX ADMIN — DEXTROSE, SODIUM CHLORIDE, AND POTASSIUM CHLORIDE 75 ML/HR: 5; .45; .15 INJECTION INTRAVENOUS at 09:25

## 2024-10-28 RX ADMIN — PANTOPRAZOLE SODIUM 40 MG: 40 INJECTION, POWDER, FOR SOLUTION INTRAVENOUS at 09:26

## 2024-10-28 RX ADMIN — NEOMYCIN SULFATE 1000 MG: 500 TABLET ORAL at 13:26

## 2024-10-28 NOTE — WOUND OSTOMY CARE
Wound care consulted for stoma marking. Patient is scheduled to under go left colectomy. Reviewed rationale of stoma marking with patient, and they are agreeable to proceed.    Education: Role of ostomy nurse and purpose of marking    The patient was visualized in the sitting, standing, and laying positions to assess the contours of the abdomen. The rectus muscle was identified by having patient tuck chin to chest and engaging rectus muscles. The patient was ultimately marked in the RLQ and LLQ. This area is flat, within the rectus muscle, and is in good view of the patient. It is away from bony prominences, scars and the umbilicus. The skin was marked using a skin marker and marking was covered using a tegaderm dressing. The patient given a skin marker and additional tegaderm dressings to maintain the marking, educated to darken the marking and cover with tegaderm dressing as needed.     Patient made aware that stoma marking is a guide, and the final location of the stoma will be determined by the surgeon.     Patient verbalized understanding.    Wound care team will follow along with patient during inpatient stay for ostomy teaching and education.       Antionette LIANGN, RN, CWOCN

## 2024-10-29 ENCOUNTER — ANESTHESIA (INPATIENT)
Dept: PERIOP | Facility: HOSPITAL | Age: 54
DRG: 231 | End: 2024-10-29
Payer: COMMERCIAL

## 2024-10-29 ENCOUNTER — ANESTHESIA EVENT (INPATIENT)
Dept: PERIOP | Facility: HOSPITAL | Age: 54
DRG: 231 | End: 2024-10-29
Payer: COMMERCIAL

## 2024-10-29 LAB
ABO GROUP BLD: NORMAL
ANION GAP SERPL CALCULATED.3IONS-SCNC: 9 MMOL/L (ref 4–13)
BASOPHILS # BLD AUTO: 0.02 THOUSANDS/ΜL (ref 0–0.1)
BASOPHILS NFR BLD AUTO: 0 % (ref 0–1)
BUN SERPL-MCNC: 11 MG/DL (ref 5–25)
CALCIUM SERPL-MCNC: 8.1 MG/DL (ref 8.4–10.2)
CHLORIDE SERPL-SCNC: 106 MMOL/L (ref 96–108)
CO2 SERPL-SCNC: 23 MMOL/L (ref 21–32)
CREAT SERPL-MCNC: 1.03 MG/DL (ref 0.6–1.3)
EOSINOPHIL # BLD AUTO: 0.3 THOUSAND/ΜL (ref 0–0.61)
EOSINOPHIL NFR BLD AUTO: 4 % (ref 0–6)
ERYTHROCYTE [DISTWIDTH] IN BLOOD BY AUTOMATED COUNT: 12.2 % (ref 11.6–15.1)
GFR SERPL CREATININE-BSD FRML MDRD: 80 ML/MIN/1.73SQ M
GLUCOSE SERPL-MCNC: 108 MG/DL (ref 65–140)
HCT VFR BLD AUTO: 42.4 % (ref 36.5–49.3)
HGB BLD-MCNC: 14.2 G/DL (ref 12–17)
IMM GRANULOCYTES # BLD AUTO: 0.02 THOUSAND/UL (ref 0–0.2)
IMM GRANULOCYTES NFR BLD AUTO: 0 % (ref 0–2)
LYMPHOCYTES # BLD AUTO: 1.24 THOUSANDS/ΜL (ref 0.6–4.47)
LYMPHOCYTES NFR BLD AUTO: 16 % (ref 14–44)
MAGNESIUM SERPL-MCNC: 1.9 MG/DL (ref 1.9–2.7)
MCH RBC QN AUTO: 30.7 PG (ref 26.8–34.3)
MCHC RBC AUTO-ENTMCNC: 33.5 G/DL (ref 31.4–37.4)
MCV RBC AUTO: 92 FL (ref 82–98)
MONOCYTES # BLD AUTO: 1 THOUSAND/ΜL (ref 0.17–1.22)
MONOCYTES NFR BLD AUTO: 13 % (ref 4–12)
NEUTROPHILS # BLD AUTO: 5 THOUSANDS/ΜL (ref 1.85–7.62)
NEUTS SEG NFR BLD AUTO: 67 % (ref 43–75)
NRBC BLD AUTO-RTO: 0 /100 WBCS
PHOSPHATE SERPL-MCNC: 3.3 MG/DL (ref 2.7–4.5)
PLATELET # BLD AUTO: 317 THOUSANDS/UL (ref 149–390)
PMV BLD AUTO: 9.4 FL (ref 8.9–12.7)
POTASSIUM SERPL-SCNC: 3.9 MMOL/L (ref 3.5–5.3)
RBC # BLD AUTO: 4.63 MILLION/UL (ref 3.88–5.62)
RH BLD: POSITIVE
SODIUM SERPL-SCNC: 138 MMOL/L (ref 135–147)
WBC # BLD AUTO: 7.58 THOUSAND/UL (ref 4.31–10.16)

## 2024-10-29 PROCEDURE — 0D1N4Z4 BYPASS SIGMOID COLON TO CUTANEOUS, PERCUTANEOUS ENDOSCOPIC APPROACH: ICD-10-PCS | Performed by: COLON & RECTAL SURGERY

## 2024-10-29 PROCEDURE — 88342 IMHCHEM/IMCYTCHM 1ST ANTB: CPT | Performed by: SPECIALIST

## 2024-10-29 PROCEDURE — 88309 TISSUE EXAM BY PATHOLOGIST: CPT | Performed by: PATHOLOGY

## 2024-10-29 PROCEDURE — 44213 LAP MOBIL SPLENIC FL ADD-ON: CPT | Performed by: COLON & RECTAL SURGERY

## 2024-10-29 PROCEDURE — 88341 IMHCHEM/IMCYTCHM EA ADD ANTB: CPT | Performed by: PATHOLOGY

## 2024-10-29 PROCEDURE — 44206 LAP PART COLECTOMY W/STOMA: CPT | Performed by: COLON & RECTAL SURGERY

## 2024-10-29 PROCEDURE — NC001 PR NO CHARGE: Performed by: COLON & RECTAL SURGERY

## 2024-10-29 PROCEDURE — 0DTN4ZG RESECTION OF SIGMOID COLON, PERCUTANEOUS ENDOSCOPIC APPROACH, HAND-ASSISTED: ICD-10-PCS | Performed by: COLON & RECTAL SURGERY

## 2024-10-29 PROCEDURE — 88342 IMHCHEM/IMCYTCHM 1ST ANTB: CPT | Performed by: PATHOLOGY

## 2024-10-29 PROCEDURE — 83735 ASSAY OF MAGNESIUM: CPT | Performed by: PHYSICIAN ASSISTANT

## 2024-10-29 PROCEDURE — 80048 BASIC METABOLIC PNL TOTAL CA: CPT | Performed by: PHYSICIAN ASSISTANT

## 2024-10-29 PROCEDURE — 88305 TISSUE EXAM BY PATHOLOGIST: CPT | Performed by: SPECIALIST

## 2024-10-29 PROCEDURE — 84100 ASSAY OF PHOSPHORUS: CPT | Performed by: PHYSICIAN ASSISTANT

## 2024-10-29 PROCEDURE — 0DJD8ZZ INSPECTION OF LOWER INTESTINAL TRACT, VIA NATURAL OR ARTIFICIAL OPENING ENDOSCOPIC: ICD-10-PCS | Performed by: COLON & RECTAL SURGERY

## 2024-10-29 PROCEDURE — 85025 COMPLETE CBC W/AUTO DIFF WBC: CPT | Performed by: PHYSICIAN ASSISTANT

## 2024-10-29 PROCEDURE — 88341 IMHCHEM/IMCYTCHM EA ADD ANTB: CPT | Performed by: SPECIALIST

## 2024-10-29 RX ORDER — HYDROMORPHONE HCL/PF 1 MG/ML
0.5 SYRINGE (ML) INJECTION
Status: DISCONTINUED | OUTPATIENT
Start: 2024-10-29 | End: 2024-10-29 | Stop reason: HOSPADM

## 2024-10-29 RX ORDER — ROCURONIUM BROMIDE 10 MG/ML
INJECTION, SOLUTION INTRAVENOUS AS NEEDED
Status: DISCONTINUED | OUTPATIENT
Start: 2024-10-29 | End: 2024-10-29

## 2024-10-29 RX ORDER — ALBUMIN, HUMAN INJ 5% 5 %
SOLUTION INTRAVENOUS CONTINUOUS PRN
Status: DISCONTINUED | OUTPATIENT
Start: 2024-10-29 | End: 2024-10-29

## 2024-10-29 RX ORDER — CEFAZOLIN SODIUM 2 G/50ML
2000 SOLUTION INTRAVENOUS EVERY 8 HOURS
Status: COMPLETED | OUTPATIENT
Start: 2024-10-30 | End: 2024-10-31

## 2024-10-29 RX ORDER — METRONIDAZOLE 500 MG/100ML
500 INJECTION, SOLUTION INTRAVENOUS EVERY 8 HOURS
Status: COMPLETED | OUTPATIENT
Start: 2024-10-30 | End: 2024-10-31

## 2024-10-29 RX ORDER — LANOLIN ALCOHOL/MO/W.PET/CERES
3 CREAM (GRAM) TOPICAL
Status: DISCONTINUED | OUTPATIENT
Start: 2024-10-29 | End: 2024-11-03 | Stop reason: HOSPADM

## 2024-10-29 RX ORDER — GABAPENTIN 100 MG/1
100 CAPSULE ORAL 3 TIMES DAILY
Status: DISCONTINUED | OUTPATIENT
Start: 2024-10-29 | End: 2024-11-03 | Stop reason: HOSPADM

## 2024-10-29 RX ORDER — CEFAZOLIN SODIUM 1 G/3ML
INJECTION, POWDER, FOR SOLUTION INTRAMUSCULAR; INTRAVENOUS AS NEEDED
Status: DISCONTINUED | OUTPATIENT
Start: 2024-10-29 | End: 2024-10-29

## 2024-10-29 RX ORDER — METRONIDAZOLE 500 MG/100ML
500 INJECTION, SOLUTION INTRAVENOUS ONCE
Status: COMPLETED | OUTPATIENT
Start: 2024-10-29 | End: 2024-10-29

## 2024-10-29 RX ORDER — LABETALOL HYDROCHLORIDE 5 MG/ML
10 INJECTION, SOLUTION INTRAVENOUS EVERY 4 HOURS PRN
Status: DISCONTINUED | OUTPATIENT
Start: 2024-10-29 | End: 2024-11-03 | Stop reason: HOSPADM

## 2024-10-29 RX ORDER — HYDROMORPHONE HCL/PF 1 MG/ML
SYRINGE (ML) INJECTION AS NEEDED
Status: DISCONTINUED | OUTPATIENT
Start: 2024-10-29 | End: 2024-10-29

## 2024-10-29 RX ORDER — POTASSIUM CHLORIDE 14.9 MG/ML
20 INJECTION INTRAVENOUS ONCE
Status: COMPLETED | OUTPATIENT
Start: 2024-10-29 | End: 2024-10-29

## 2024-10-29 RX ORDER — OXYCODONE HYDROCHLORIDE 10 MG/1
10 TABLET ORAL EVERY 4 HOURS PRN
Status: DISCONTINUED | OUTPATIENT
Start: 2024-10-29 | End: 2024-11-03 | Stop reason: HOSPADM

## 2024-10-29 RX ORDER — PROPOFOL 10 MG/ML
INJECTION, EMULSION INTRAVENOUS AS NEEDED
Status: DISCONTINUED | OUTPATIENT
Start: 2024-10-29 | End: 2024-10-29

## 2024-10-29 RX ORDER — SODIUM CHLORIDE, SODIUM LACTATE, POTASSIUM CHLORIDE, CALCIUM CHLORIDE 600; 310; 30; 20 MG/100ML; MG/100ML; MG/100ML; MG/100ML
INJECTION, SOLUTION INTRAVENOUS CONTINUOUS PRN
Status: DISCONTINUED | OUTPATIENT
Start: 2024-10-29 | End: 2024-10-29

## 2024-10-29 RX ORDER — ONDANSETRON 2 MG/ML
4 INJECTION INTRAMUSCULAR; INTRAVENOUS ONCE AS NEEDED
Status: DISCONTINUED | OUTPATIENT
Start: 2024-10-29 | End: 2024-10-29 | Stop reason: HOSPADM

## 2024-10-29 RX ORDER — MIDAZOLAM HYDROCHLORIDE 2 MG/2ML
INJECTION, SOLUTION INTRAMUSCULAR; INTRAVENOUS AS NEEDED
Status: DISCONTINUED | OUTPATIENT
Start: 2024-10-29 | End: 2024-10-29

## 2024-10-29 RX ORDER — METHOCARBAMOL 500 MG/1
500 TABLET, FILM COATED ORAL EVERY 6 HOURS SCHEDULED
Status: DISCONTINUED | OUTPATIENT
Start: 2024-10-29 | End: 2024-11-03 | Stop reason: HOSPADM

## 2024-10-29 RX ORDER — LIDOCAINE HYDROCHLORIDE 10 MG/ML
INJECTION, SOLUTION EPIDURAL; INFILTRATION; INTRACAUDAL; PERINEURAL AS NEEDED
Status: DISCONTINUED | OUTPATIENT
Start: 2024-10-29 | End: 2024-10-29

## 2024-10-29 RX ORDER — OXYCODONE HYDROCHLORIDE 5 MG/1
5 TABLET ORAL EVERY 4 HOURS PRN
Status: DISCONTINUED | OUTPATIENT
Start: 2024-10-29 | End: 2024-11-03 | Stop reason: HOSPADM

## 2024-10-29 RX ORDER — FENTANYL CITRATE 50 UG/ML
INJECTION, SOLUTION INTRAMUSCULAR; INTRAVENOUS AS NEEDED
Status: DISCONTINUED | OUTPATIENT
Start: 2024-10-29 | End: 2024-10-29

## 2024-10-29 RX ORDER — ONDANSETRON 2 MG/ML
INJECTION INTRAMUSCULAR; INTRAVENOUS AS NEEDED
Status: DISCONTINUED | OUTPATIENT
Start: 2024-10-29 | End: 2024-10-29

## 2024-10-29 RX ORDER — MAGNESIUM SULFATE 1 G/100ML
1 INJECTION INTRAVENOUS ONCE
Status: COMPLETED | OUTPATIENT
Start: 2024-10-29 | End: 2024-10-29

## 2024-10-29 RX ORDER — DEXAMETHASONE SODIUM PHOSPHATE 10 MG/ML
INJECTION, SOLUTION INTRAMUSCULAR; INTRAVENOUS AS NEEDED
Status: DISCONTINUED | OUTPATIENT
Start: 2024-10-29 | End: 2024-10-29

## 2024-10-29 RX ORDER — FENTANYL CITRATE/PF 50 MCG/ML
25 SYRINGE (ML) INJECTION
Status: DISCONTINUED | OUTPATIENT
Start: 2024-10-29 | End: 2024-10-29 | Stop reason: HOSPADM

## 2024-10-29 RX ORDER — SODIUM CHLORIDE, SODIUM LACTATE, POTASSIUM CHLORIDE, CALCIUM CHLORIDE 600; 310; 30; 20 MG/100ML; MG/100ML; MG/100ML; MG/100ML
125 INJECTION, SOLUTION INTRAVENOUS CONTINUOUS
Status: DISCONTINUED | OUTPATIENT
Start: 2024-10-29 | End: 2024-10-30

## 2024-10-29 RX ADMIN — LIDOCAINE HYDROCHLORIDE 50 MG: 10 INJECTION, SOLUTION EPIDURAL; INFILTRATION; INTRACAUDAL; PERINEURAL at 16:38

## 2024-10-29 RX ADMIN — ALBUMIN (HUMAN): 12.5 INJECTION, SOLUTION INTRAVENOUS at 18:17

## 2024-10-29 RX ADMIN — SODIUM CHLORIDE, SODIUM LACTATE, POTASSIUM CHLORIDE, AND CALCIUM CHLORIDE 125 ML/HR: .6; .31; .03; .02 INJECTION, SOLUTION INTRAVENOUS at 21:05

## 2024-10-29 RX ADMIN — FENTANYL CITRATE 100 MCG: 50 INJECTION INTRAMUSCULAR; INTRAVENOUS at 16:38

## 2024-10-29 RX ADMIN — ONDANSETRON 4 MG: 2 INJECTION INTRAMUSCULAR; INTRAVENOUS at 17:38

## 2024-10-29 RX ADMIN — ALBUMIN (HUMAN): 12.5 INJECTION, SOLUTION INTRAVENOUS at 19:01

## 2024-10-29 RX ADMIN — HEPARIN SODIUM 5000 UNITS: 5000 INJECTION INTRAVENOUS; SUBCUTANEOUS at 22:11

## 2024-10-29 RX ADMIN — HEPARIN SODIUM 5000 UNITS: 5000 INJECTION INTRAVENOUS; SUBCUTANEOUS at 05:01

## 2024-10-29 RX ADMIN — DEXTROSE, SODIUM CHLORIDE, AND POTASSIUM CHLORIDE 75 ML/HR: 5; .45; .15 INJECTION INTRAVENOUS at 12:58

## 2024-10-29 RX ADMIN — MAGNESIUM SULFATE HEPTAHYDRATE 1 G: 1 INJECTION, SOLUTION INTRAVENOUS at 11:36

## 2024-10-29 RX ADMIN — ACETAMINOPHEN 650 MG: 325 TABLET, FILM COATED ORAL at 23:16

## 2024-10-29 RX ADMIN — Medication 3 MG: at 22:08

## 2024-10-29 RX ADMIN — CEFAZOLIN 2000 MG: 1 INJECTION, POWDER, FOR SOLUTION INTRAMUSCULAR; INTRAVENOUS at 17:00

## 2024-10-29 RX ADMIN — ROCURONIUM BROMIDE 50 MG: 10 INJECTION, SOLUTION INTRAVENOUS at 16:38

## 2024-10-29 RX ADMIN — SODIUM CHLORIDE, SODIUM LACTATE, POTASSIUM CHLORIDE, AND CALCIUM CHLORIDE: .6; .31; .03; .02 INJECTION, SOLUTION INTRAVENOUS at 18:17

## 2024-10-29 RX ADMIN — HYDROMORPHONE HYDROCHLORIDE 0.5 MG: 1 INJECTION, SOLUTION INTRAMUSCULAR; INTRAVENOUS; SUBCUTANEOUS at 19:42

## 2024-10-29 RX ADMIN — GABAPENTIN 100 MG: 100 CAPSULE ORAL at 22:08

## 2024-10-29 RX ADMIN — ROCURONIUM BROMIDE 20 MG: 10 INJECTION, SOLUTION INTRAVENOUS at 18:31

## 2024-10-29 RX ADMIN — METHOCARBAMOL 500 MG: 500 TABLET ORAL at 22:08

## 2024-10-29 RX ADMIN — HYDROMORPHONE HYDROCHLORIDE 0.5 MG: 1 INJECTION, SOLUTION INTRAMUSCULAR; INTRAVENOUS; SUBCUTANEOUS at 19:40

## 2024-10-29 RX ADMIN — PHENYLEPHRINE HYDROCHLORIDE 100 MCG: 10 INJECTION INTRAVENOUS at 16:45

## 2024-10-29 RX ADMIN — PANTOPRAZOLE SODIUM 40 MG: 40 INJECTION, POWDER, FOR SOLUTION INTRAVENOUS at 07:53

## 2024-10-29 RX ADMIN — Medication: at 20:15

## 2024-10-29 RX ADMIN — MIDAZOLAM 2 MG: 1 INJECTION INTRAMUSCULAR; INTRAVENOUS at 16:30

## 2024-10-29 RX ADMIN — PHENYLEPHRINE HYDROCHLORIDE 50 MCG/MIN: 10 INJECTION INTRAVENOUS at 16:44

## 2024-10-29 RX ADMIN — HEPARIN SODIUM 5000 UNITS: 5000 INJECTION INTRAVENOUS; SUBCUTANEOUS at 17:17

## 2024-10-29 RX ADMIN — ROCURONIUM BROMIDE 30 MG: 10 INJECTION, SOLUTION INTRAVENOUS at 17:22

## 2024-10-29 RX ADMIN — PROPOFOL 180 MG: 10 INJECTION, EMULSION INTRAVENOUS at 16:38

## 2024-10-29 RX ADMIN — CEFAZOLIN SODIUM 2000 MG: 2 SOLUTION INTRAVENOUS at 23:17

## 2024-10-29 RX ADMIN — SODIUM CHLORIDE, SODIUM LACTATE, POTASSIUM CHLORIDE, AND CALCIUM CHLORIDE: .6; .31; .03; .02 INJECTION, SOLUTION INTRAVENOUS at 16:32

## 2024-10-29 RX ADMIN — SODIUM CHLORIDE, SODIUM LACTATE, POTASSIUM CHLORIDE, AND CALCIUM CHLORIDE 125 ML/HR: .6; .31; .03; .02 INJECTION, SOLUTION INTRAVENOUS at 23:59

## 2024-10-29 RX ADMIN — POTASSIUM CHLORIDE 20 MEQ: 14.9 INJECTION, SOLUTION INTRAVENOUS at 09:25

## 2024-10-29 RX ADMIN — DEXAMETHASONE SODIUM PHOSPHATE 5 MG: 10 INJECTION, SOLUTION INTRAMUSCULAR; INTRAVENOUS at 17:38

## 2024-10-29 RX ADMIN — SUGAMMADEX 200 MG: 100 INJECTION, SOLUTION INTRAVENOUS at 19:49

## 2024-10-29 RX ADMIN — METRONIDAZOLE: 500 SOLUTION INTRAVENOUS at 17:00

## 2024-10-29 RX ADMIN — ALBUMIN (HUMAN): 12.5 INJECTION, SOLUTION INTRAVENOUS at 18:39

## 2024-10-29 RX ADMIN — DEXMEDETOMIDINE HYDROCHLORIDE 0.4 MCG/KG/HR: 100 INJECTION, SOLUTION INTRAVENOUS at 17:33

## 2024-10-29 NOTE — ASSESSMENT & PLAN NOTE
Partially obstructing apple-core lesion in descending/sigmoid colon now s/p 10/24 sigmoid metal stent placed by GI, malignant appearing fungating invasive and ulcerated mass in sigmoid colon.    Finished bowel prep with tan-mucous stools.    Plan:  -NPO for L colectomy today  -post op orders to follow  -dvt ppx: SQH

## 2024-10-29 NOTE — PROGRESS NOTES
Progress Note - Colorectal   Name: Darin Bullock 54 y.o. 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  Now s/p 10/29 lap hand-assisted L hemicolectomy, end colostomy.    Plan:  -advance diet to CLD/toast/crax, mIVF@84  -d/c Mosquera  -maintain PCA, PO pain meds  -DVT ppx, IS, OOB/ambulate    Subjective/Objective   NAOE. Pain controlled. Didn't have any PO intake since he got out of the OR late last night. No n/v. Will walk today.    Physical Exam:  General: NAD  CV: nl rate  Lungs: nl wob. No resp distress.  ABD: Soft, ND, mild lower tenderness, CDI. Ostomy pink, thick, with ~15cc ss in bag.  Extrem: No CCE  Mosquera with 1085cc UOP    Patient Vitals for the past 24 hrs:   BP Temp Pulse Resp SpO2   10/30/24 0643 111/70 98.2 °F (36.8 °C) 82 16 96 %   10/30/24 0451 100/66 -- 87 -- 97 %   10/30/24 0308 98/64 -- -- -- --   10/30/24 0302 97/65 98.7 °F (37.1 °C) 84 16 97 %   10/29/24 2359 121/74 98.1 °F (36.7 °C) 94 16 98 %   10/29/24 2311 122/73 97.5 °F (36.4 °C) 89 16 99 %   10/29/24 2207 124/72 97.7 °F (36.5 °C) 80 16 99 %   10/29/24 2104 119/71 97.7 °F (36.5 °C) 72 16 99 %   10/29/24 2104 -- -- 72 -- 96 %   10/29/24 2045 110/63 -- 71 18 98 %   10/29/24 2030 -- -- 72 18 98 %   10/29/24 2015 107/63 -- 70 18 98 %   10/29/24 2008 107/60 98.3 °F (36.8 °C) 71 18 94 %   10/29/24 1502 115/87 98 °F (36.7 °C) 82 20 95 %   10/29/24 0812 107/86 98.3 °F (36.8 °C) 84 16 97 %       I/O         10/27 0701  10/28 0700 10/28 0701  10/29 0700 10/29 0701  10/30 0700    P.O. 298      I.V. (mL/kg)  1020 (12)     IV Piggyback   200    Total Intake(mL/kg) 298 (3.5) 1020 (12) 200 (2.4)    Urine (mL/kg/hr)       Stool       Total Output       Net +298 +1020 +200           Unmeasured Urine Occurrence   2 x    Unmeasured  Stool Occurrence   1 x            Recent Labs     10/29/24  0442 10/30/24  0455   WBC 7.58 9.42   HGB 14.2 12.6    281   SODIUM 138 135   K 3.9 4.9    103   CO2 23 22   BUN 11 11   CREATININE 1.03 1.18   GLUC 108 122   CALCIUM 8.1* 8.3*   MG 1.9 2.0   PHOS 3.3  --    EGFR 80 68     Lab Results   Component Value Date    LEUKOCYTESUR Negative 10/22/2024

## 2024-10-29 NOTE — ANESTHESIA PREPROCEDURE EVALUATION
Procedure:  LEFT COLECTOMY, LAPAROSCOPIC (Abdomen)    Relevant Problems   GI/HEPATIC   (+) Bowel obstruction (HCC)   (+) GERD (gastroesophageal reflux disease)        Physical Exam    Airway    Mallampati score: II  TM Distance: >3 FB  Neck ROM: full     Dental   No notable dental hx     Cardiovascular  Cardiovascular exam normal    Pulmonary      Other Findings        Anesthesia Plan  ASA Score- 3     Anesthesia Type- general with ASA Monitors.         Additional Monitors:     Airway Plan: ETT.           Plan Factors-Exercise tolerance (METS): >4 METS.    Chart reviewed. EKG reviewed.  Existing labs reviewed. Patient summary reviewed.    Patient is not a current smoker.              Induction- intravenous.    Postoperative Plan- Plan for postoperative opioid use. Planned trial extubation    Perioperative Resuscitation Plan - Level 1 - Full Code.       Informed Consent- Anesthetic plan and risks discussed with patient.  I personally reviewed this patient with the CRNA. Discussed and agreed on the Anesthesia Plan with the CRNA..

## 2024-10-29 NOTE — PROGRESS NOTES
Progress Note - Thoracic    Name: Darin Bullock 54 y.o. male I MRN: [id number]  Unit/Bed#: [facility] 834-01 I Date of Admission: 10/23/2024   Date of Service: 10/29/2024 I Hospital Day: 6    Assessment & Plan  Bowel obstruction (HCC)  Partially obstructing apple-core lesion in descending/sigmoid colon now s/p 10/24 sigmoid metal stent placed by GI, malignant appearing fungating invasive and ulcerated mass in sigmoid colon.    Finished bowel prep with tan-mucous stools.    Plan:  -NPO for L colectomy today  -post op orders to follow  -dvt ppx: SQH        24 Hour Events : completed bowel prep  Subjective : No no acute events overnight.  Patient completed and tolerated bowel prep, reporting clear mucus stools.  Patient reports feeling well this morning and ready for surgery.  No acute complaints or concerns this morning.    Objective :  Temp:  [97.5 °F (36.4 °C)-98.3 °F (36.8 °C)] 98.3 °F (36.8 °C)  HR:  [78-88] 88  BP: (136-139)/(93-95) 136/94  Resp:  [16] 16  SpO2:  [94 %-95 %] 95 %    I/O         10/27 0701  10/28 0700 10/28 0701  10/29 0700    P.O. 298     Total Intake(mL/kg) 298 (3.5)     Net +298                   Physical Exam  General: NAD  HENT: NCAT MMM  Neck: supple, no JVD  CV: nl rate  Lungs: nl wob. No resp distress  ABD: Soft, nontender, nondistended  Extrem: No CCE  Neuro: AAOx3      Lab Results: I have reviewed the following results:  Recent Labs     10/28/24  0444   SODIUM 138   K 3.6      CO2 25   BUN 15   CREATININE 1.07   GLUC 104   MG 2.1             VTE Pharmacologic Prophylaxis: VTE covered by:  heparin (porcine), Subcutaneous, 5,000 Units at 10/28/24 2105     VTE Mechanical Prophylaxis: sequential compression device

## 2024-10-29 NOTE — ASSESSMENT & PLAN NOTE
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  Now s/p 10/29 lap hand-assisted L hemicolectomy, end colostomy.    Plan:  -advance diet to CLD/toast/crax, mIVF@84  -d/c Mosquera  -maintain PCA, PO pain meds  -DVT ppx, IS, OOB/ambulate

## 2024-10-29 NOTE — PLAN OF CARE
Problem: GASTROINTESTINAL - ADULT  Goal: Maintains or returns to baseline bowel function  Description: INTERVENTIONS:  - Assess bowel function  - Encourage oral fluids to ensure adequate hydration  - Administer IV fluids if ordered to ensure adequate hydration  - Administer ordered medications as needed  - Encourage mobilization and activity  - Consider nutritional services referral to assist patient with adequate nutrition and appropriate food choices  Outcome: Progressing  Goal: Maintains adequate nutritional intake  Description: INTERVENTIONS:  - Monitor percentage of each meal consumed  - Identify factors contributing to decreased intake, treat as appropriate  - Assist with meals as needed  - Monitor I&O, weight, and lab values if indicated  - Obtain nutrition services referral as needed  Outcome: Progressing     Problem: METABOLIC, FLUID AND ELECTROLYTES - ADULT  Goal: Electrolytes maintained within normal limits  Description: INTERVENTIONS:  - Monitor labs and assess patient for signs and symptoms of electrolyte imbalances  - Administer electrolyte replacement as ordered  - Monitor response to electrolyte replacements, including repeat lab results as appropriate  - Instruct patient on fluid and nutrition as appropriate  Outcome: Progressing     Problem: PAIN - ADULT  Goal: Verbalizes/displays adequate comfort level or baseline comfort level  Description: Interventions:  - Encourage patient to monitor pain and request assistance  - Assess pain using appropriate pain scale  - Administer analgesics based on type and severity of pain and evaluate response  - Implement non-pharmacological measures as appropriate and evaluate response  - Consider cultural and social influences on pain and pain management  - Notify physician/advanced practitioner if interventions unsuccessful or patient reports new pain  Outcome: Progressing     Problem: INFECTION - ADULT  Goal: Absence or prevention of progression during  hospitalization  Description: INTERVENTIONS:  - Assess and monitor for signs and symptoms of infection  - Monitor lab/diagnostic results  - Monitor all insertion sites, i.e. indwelling lines, tubes, and drains  - Monitor endotracheal if appropriate and nasal secretions for changes in amount and color  - [place] appropriate cooling/warming therapies per order  - Administer medications as ordered  - Instruct and encourage patient and family to use good hand hygiene technique  - Identify and instruct in appropriate isolation precautions for identified infection/condition  Outcome: Progressing     Problem: SAFETY ADULT  Goal: Patient will remain free of falls  Description: INTERVENTIONS:  - Educate patient/family on patient safety including physical limitations  - Instruct patient to call for assistance with activity   - Consult OT/PT to assist with strengthening/mobility   - Keep Call bell within reach  - Keep bed low and locked with side rails adjusted as appropriate  - Keep care items and personal belongings within reach  - Initiate and maintain comfort rounds  - Make Fall Risk Sign visible to staff  - Apply yellow socks and bracelet for high fall risk patients  - Consider moving patient to room near nurses station  Outcome: Progressing     Problem: DISCHARGE PLANNING  Goal: Discharge to home or other facility with appropriate resources  Description: INTERVENTIONS:  - Identify barriers to discharge w/patient and caregiver  - Arrange for needed discharge resources and transportation as appropriate  - Identify discharge learning needs (meds, wound care, etc.)  - Arrange for interpretive services to assist at discharge as needed  - Refer to Case Management Department for coordinating discharge planning if the patient needs post-hospital services based on physician/advanced practitioner order or complex needs related to functional status, cognitive ability, or social support system  Outcome: Progressing     Problem:  Knowledge Deficit  Goal: Patient/family/caregiver demonstrates understanding of disease process, treatment plan, medications, and discharge instructions  Description: Complete learning assessment and assess knowledge base.  Interventions:  - Provide teaching at level of understanding  - Provide teaching via preferred learning methods  Outcome: Progressing

## 2024-10-29 NOTE — CASE MANAGEMENT
Case Management Discharge Planning Note    Patient name Darin Bullock  Location [facility]-01 MRN [id number]  : 1970 Date 10/29/2024       Current Admission Date: 10/23/2024  Current Admission Diagnosis:Bowel obstruction (HCC)  Patient Active Problem List    Diagnosis Date Noted Date Diagnosed    Bowel obstruction (HCC) 10/25/2024     GERD (gastroesophageal reflux disease) 10/23/2024     Constipation 10/21/2024     SIRS (systemic inflammatory response syndrome) (HCC) 10/21/2024     Elevated serum creatinine 10/21/2024     Nausea and vomiting 10/21/2024     Prolonged Q-T interval on ECG 10/21/2024       LOS (days): 6  Geometric Mean LOS (GMLOS) (days): 3.6  Days to GMLOS:-2     OBJECTIVE:  Risk of Unplanned Readmission Score: 10.1         Current admission status: Inpatient   Preferred Pharmacy:   [id number] - [place], [place] - [address]  [facility] [address]  Phone: [phone] Fax: [phone]    Primary Care Provider: No primary care provider on file.    Primary Insurance: [employer]  Secondary Insurance:     DISCHARGE DETAILS:           Pt going to OR today for L colectomy  Will follow for aftercare needs

## 2024-10-30 LAB
ABO GROUP BLD BPU: NORMAL
ABO GROUP BLD BPU: NORMAL
ANION GAP SERPL CALCULATED.3IONS-SCNC: 10 MMOL/L (ref 4–13)
BASOPHILS # BLD AUTO: 0.01 THOUSANDS/ΜL (ref 0–0.1)
BASOPHILS NFR BLD AUTO: 0 % (ref 0–1)
BPU ID: NORMAL
BPU ID: NORMAL
BUN SERPL-MCNC: 11 MG/DL (ref 5–25)
CALCIUM SERPL-MCNC: 8.3 MG/DL (ref 8.4–10.2)
CHLORIDE SERPL-SCNC: 103 MMOL/L (ref 96–108)
CO2 SERPL-SCNC: 22 MMOL/L (ref 21–32)
CREAT SERPL-MCNC: 1.18 MG/DL (ref 0.6–1.3)
CROSSMATCH: NORMAL
CROSSMATCH: NORMAL
EOSINOPHIL # BLD AUTO: 0 THOUSAND/ΜL (ref 0–0.61)
EOSINOPHIL NFR BLD AUTO: 0 % (ref 0–6)
ERYTHROCYTE [DISTWIDTH] IN BLOOD BY AUTOMATED COUNT: 11.9 % (ref 11.6–15.1)
GFR SERPL CREATININE-BSD FRML MDRD: 68 ML/MIN/1.73SQ M
GLUCOSE SERPL-MCNC: 122 MG/DL (ref 65–140)
HCT VFR BLD AUTO: 37.5 % (ref 36.5–49.3)
HGB BLD-MCNC: 12.6 G/DL (ref 12–17)
IMM GRANULOCYTES # BLD AUTO: 0.02 THOUSAND/UL (ref 0–0.2)
IMM GRANULOCYTES NFR BLD AUTO: 0 % (ref 0–2)
LYMPHOCYTES # BLD AUTO: 0.59 THOUSANDS/ΜL (ref 0.6–4.47)
LYMPHOCYTES NFR BLD AUTO: 6 % (ref 14–44)
MAGNESIUM SERPL-MCNC: 2 MG/DL (ref 1.9–2.7)
MCH RBC QN AUTO: 31.2 PG (ref 26.8–34.3)
MCHC RBC AUTO-ENTMCNC: 33.6 G/DL (ref 31.4–37.4)
MCV RBC AUTO: 93 FL (ref 82–98)
MONOCYTES # BLD AUTO: 1.16 THOUSAND/ΜL (ref 0.17–1.22)
MONOCYTES NFR BLD AUTO: 12 % (ref 4–12)
NEUTROPHILS # BLD AUTO: 7.64 THOUSANDS/ΜL (ref 1.85–7.62)
NEUTS SEG NFR BLD AUTO: 82 % (ref 43–75)
NRBC BLD AUTO-RTO: 0 /100 WBCS
PLATELET # BLD AUTO: 281 THOUSANDS/UL (ref 149–390)
PMV BLD AUTO: 9.1 FL (ref 8.9–12.7)
POTASSIUM SERPL-SCNC: 4.9 MMOL/L (ref 3.5–5.3)
RBC # BLD AUTO: 4.04 MILLION/UL (ref 3.88–5.62)
SODIUM SERPL-SCNC: 135 MMOL/L (ref 135–147)
UNIT DISPENSE STATUS: NORMAL
UNIT DISPENSE STATUS: NORMAL
UNIT PRODUCT CODE: NORMAL
UNIT PRODUCT CODE: NORMAL
UNIT PRODUCT VOLUME: 350 ML
UNIT PRODUCT VOLUME: 350 ML
UNIT RH: NORMAL
UNIT RH: NORMAL
WBC # BLD AUTO: 9.42 THOUSAND/UL (ref 4.31–10.16)

## 2024-10-30 PROCEDURE — 97162 PT EVAL MOD COMPLEX 30 MIN: CPT

## 2024-10-30 PROCEDURE — 80048 BASIC METABOLIC PNL TOTAL CA: CPT

## 2024-10-30 PROCEDURE — 97166 OT EVAL MOD COMPLEX 45 MIN: CPT

## 2024-10-30 PROCEDURE — 99024 POSTOP FOLLOW-UP VISIT: CPT | Performed by: COLON & RECTAL SURGERY

## 2024-10-30 PROCEDURE — 83735 ASSAY OF MAGNESIUM: CPT

## 2024-10-30 PROCEDURE — 85025 COMPLETE CBC W/AUTO DIFF WBC: CPT

## 2024-10-30 RX ORDER — HYDROMORPHONE HCL IN WATER/PF 6 MG/30 ML
0.2 PATIENT CONTROLLED ANALGESIA SYRINGE INTRAVENOUS EVERY 4 HOURS PRN
Status: DISCONTINUED | OUTPATIENT
Start: 2024-10-30 | End: 2024-10-31

## 2024-10-30 RX ORDER — DEXTROSE MONOHYDRATE, SODIUM CHLORIDE, AND POTASSIUM CHLORIDE 50; 1.49; 4.5 G/1000ML; G/1000ML; G/1000ML
84 INJECTION, SOLUTION INTRAVENOUS CONTINUOUS
Status: DISCONTINUED | OUTPATIENT
Start: 2024-10-30 | End: 2024-10-31

## 2024-10-30 RX ADMIN — METRONIDAZOLE 500 MG: 500 INJECTION, SOLUTION INTRAVENOUS at 18:23

## 2024-10-30 RX ADMIN — PANTOPRAZOLE SODIUM 40 MG: 40 INJECTION, POWDER, FOR SOLUTION INTRAVENOUS at 09:08

## 2024-10-30 RX ADMIN — ACETAMINOPHEN 650 MG: 325 TABLET, FILM COATED ORAL at 05:00

## 2024-10-30 RX ADMIN — DEXTROSE, SODIUM CHLORIDE, AND POTASSIUM CHLORIDE 84 ML/HR: 5; .45; .15 INJECTION INTRAVENOUS at 21:06

## 2024-10-30 RX ADMIN — ACETAMINOPHEN 650 MG: 325 TABLET, FILM COATED ORAL at 12:15

## 2024-10-30 RX ADMIN — METRONIDAZOLE 500 MG: 500 INJECTION, SOLUTION INTRAVENOUS at 09:11

## 2024-10-30 RX ADMIN — METHOCARBAMOL 500 MG: 500 TABLET ORAL at 17:18

## 2024-10-30 RX ADMIN — HEPARIN SODIUM 5000 UNITS: 5000 INJECTION INTRAVENOUS; SUBCUTANEOUS at 14:23

## 2024-10-30 RX ADMIN — GABAPENTIN 100 MG: 100 CAPSULE ORAL at 21:01

## 2024-10-30 RX ADMIN — CEFAZOLIN SODIUM 2000 MG: 2 SOLUTION INTRAVENOUS at 09:11

## 2024-10-30 RX ADMIN — ACETAMINOPHEN 650 MG: 325 TABLET, FILM COATED ORAL at 17:18

## 2024-10-30 RX ADMIN — GABAPENTIN 100 MG: 100 CAPSULE ORAL at 09:08

## 2024-10-30 RX ADMIN — METRONIDAZOLE 500 MG: 500 INJECTION, SOLUTION INTRAVENOUS at 00:01

## 2024-10-30 RX ADMIN — METHOCARBAMOL 500 MG: 500 TABLET ORAL at 12:16

## 2024-10-30 RX ADMIN — HEPARIN SODIUM 5000 UNITS: 5000 INJECTION INTRAVENOUS; SUBCUTANEOUS at 21:01

## 2024-10-30 RX ADMIN — METHOCARBAMOL 500 MG: 500 TABLET ORAL at 05:00

## 2024-10-30 RX ADMIN — DEXTROSE, SODIUM CHLORIDE, AND POTASSIUM CHLORIDE 84 ML/HR: 5; .45; .15 INJECTION INTRAVENOUS at 07:07

## 2024-10-30 RX ADMIN — CEFAZOLIN SODIUM 2000 MG: 2 SOLUTION INTRAVENOUS at 23:48

## 2024-10-30 RX ADMIN — Medication 3 MG: at 21:01

## 2024-10-30 RX ADMIN — GABAPENTIN 100 MG: 100 CAPSULE ORAL at 17:18

## 2024-10-30 RX ADMIN — HEPARIN SODIUM 5000 UNITS: 5000 INJECTION INTRAVENOUS; SUBCUTANEOUS at 05:00

## 2024-10-30 RX ADMIN — CEFAZOLIN SODIUM 2000 MG: 2 SOLUTION INTRAVENOUS at 17:18

## 2024-10-30 NOTE — OCCUPATIONAL THERAPY NOTE
Occupational Therapy Evaluation     Patient Name: Darin Bullock  Today's Date: 10/30/2024  Problem List  Active Problems:    Bowel obstruction (HCC)    Past Medical History  History reviewed. No pertinent past medical history.  Past Surgical History  History reviewed. No pertinent surgical history.        10/30/24 0832   OT Last Visit   OT Visit Date 10/30/24   Note Type   Note type Evaluation   Additional Comments Pt seen w/ PT to increase safety, decrease fall risk, and maximize functional/occupational performance 2* medical complexity which is a regression from pt's functional baseline.   Pain Assessment   Pain Assessment Tool 0-10   Pain Score 2   Pain Location/Orientation Orientation: Bilateral;Location: Abdomen   Effect of Pain on Daily Activities Impacts ability to engage in valued occupations   Hospital Pain Intervention(s) Repositioned;Ambulation/increased activity;Emotional support   Restrictions/Precautions   Weight Bearing Precautions Per Order No   Other Precautions Multiple lines;Telemetry;Fall Risk;Pain  (PCA pump)   Home Living   Type of Home House  (2 SH)   Home Layout Two level;Bed/bath upstairs;Access;Able to live on main level with bedroom/bathroom;Performs ADLs on one level   Bathroom Shower/Tub Tub/shower unit   Bathroom Toilet Standard   Bathroom Equipment Other (Comment)  (No DME)   Bathroom Accessibility Accessible   Home Equipment Other (Comment)  (No DME)   Additional Comments Pt reports living with his significant other in a 2 SH with 0 CAPRICE with full flight to access 2nd floor; no DME PTA   Prior Function   Level of Day Independent with ADLs;Independent with functional mobility;Independent with IADLS   Lives With Significant other   Receives Help From Family   IADLs Independent with driving;Independent with meal prep;Independent with medication management   Falls in the last 6 months 0   Vocational Full time employment   Comments (+) driving   Lifestyle   Autonomy PTA, pt  "was independent in ADLs/IADLs and uses no DME for functional mobility; (+) driving   Reciprocal Relationships Lives with his significant other   Service to Others 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   Intrinsic Gratification Enjoys playing music   Subjective   Subjective \"I feel fine.\"   ADL   Where Assessed Chair   Eating Assistance 5  Supervision/Setup   Grooming Assistance 5  Supervision/Setup   UB Bathing Assistance 5  Supervision/Setup   LB Bathing Assistance 4  Minimal Assistance   UB Dressing Assistance 5  Supervision/Setup   LB Dressing Assistance 4  Minimal Assistance   Toileting Assistance  5  Supervision/Setup   Functional Assistance 5  Supervision/Setup   Additional Comments Anticipate pending continued functional progress, pt with progress to higher levels of independence; has good support @ home   Bed Mobility   Supine to Sit 5  Supervision   Additional items Increased time required;Verbal cues   Sit to Supine Unable to assess   Additional Comments At end of session, pt left sitting upright in recliner with all functional needs in reach   Transfers   Sit to Stand 5  Supervision   Additional items Increased time required;Verbal cues   Stand to Sit 5  Supervision   Additional items Increased time required;Verbal cues   Additional Comments w/ RW   Functional Mobility   Functional Mobility 5  Supervision   Additional Comments Pt completed long household functional mobility distances @ supervision level w/ initial use of RW transitioning to no DME   Additional items Rolling walker  (Progressing to no DME)   Balance   Static Sitting Fair +   Dynamic Sitting Fair   Static Standing Fair   Dynamic Standing Fair   Ambulatory Fair -   Activity Tolerance   Activity Tolerance Patient tolerated treatment well;Patient limited by fatigue   Medical Staff Made Aware ROX Brady   Nurse Made Aware RN cleared   RUE Assessment   RUE Assessment WFL   LUE Assessment " "  LUE Assessment WFL   Hand Function   Gross Motor Coordination Functional   Fine Motor Coordination Functional   Cognition   Overall Cognitive Status WFL   Arousal/Participation Alert;Responsive;Arousable;Cooperative   Attention Within functional limits   Orientation Level Oriented X4   Memory Within functional limits   Following Commands Follows all commands and directions without difficulty   Comments Pt pleasant and cooperative   Assessment   Prognosis Fair   Assessment Pt is a 55 yo male admitted to [facility] s/p \"LEFT EXTENDED COLECTOMY LAPAROSCOPIC- HAND ASSISTED.   LAPAROSCOPIC MOBILIZATION OF SPLENIC FLEXURE  CREATION DESENDING COLOSTOMY  SIGMOIDOSCOPY FLEXIBLE\" on 10/29. Per chart review, pt initially presenting to [facility] with constipation in which pt presenting to [facility] and was d/c home with prescription. Pt re-presenting to [facility] 2* nausea/vomiting in which pt transferred to [facility] for colonoscopy with possible surgery. Pt  has no past medical history on file. Pt with active OT orders and OT consulted to assess pt's functional status and occupational performance to determine safe d/c needs. Pt lives with her significant other in a 2  with 0 CAPRICE. PTA, pt was independent in ADLs/IADLs and uses no DME. (+) driving. Discussed role and scope of OT in which pt was receptive. At this time, pt is not demonstrating any significant occupational deficits and is functioning at a level of supervision for bed mobility, functional transfers, functional mobility, UB ADLs @ supervision, and LB ADLs w/ Min A. From an OT standpoint, recommend discharge to home with increased support once medically stable. Pt was receptive regarding education on returning home safely with energy conservation techniques and demonstrated good carryover during occupational/functional performance. At this time, pt demonstrates good insight/safety awareness and does not express any concerns regarding performing " ADL/IADL/functional mobility tasks. No further skilled acute care OT services are needed at this time. The patient's raw score on the AM-PAC Daily Activity Inpatient Short Form is 20. A raw score of greater than or equal to 19 suggests the patient may benefit from discharge to home. Please refer to the recommendation of the Occupational Therapist for safe discharge planning.  Recommend continued engagement in ADL/functional mobility tasks with nursing and restorative therapy staff as appropriate to promote the highest level of independence prior to discharge. OT is discharging pt from caseload at this time, please reconsult if needed.   Goals   Patient Goals To go home   Plan   OT Frequency Eval only   Discharge Recommendation   Rehab Resource Intensity Level, OT No post-acute rehabilitation needs  (Increased support/assistance upon d/c)   AM-PAC Daily Activity Inpatient   Lower Body Dressing 3   Bathing 3   Toileting 3   Upper Body Dressing 3   Grooming 4   Eating 4   Daily Activity Raw Score 20   Daily Activity Standardized Score (Calc for Raw Score >=11) 42.03   AM-PAC Applied Cognition Inpatient   Following a Speech/Presentation 4   Understanding Ordinary Conversation 4   Taking Medications 4   Remembering Where Things Are Placed or Put Away 4   Remembering List of 4-5 Errands 4   Taking Care of Complicated Tasks 4   Applied Cognition Raw Score 24   Applied Cognition Standardized Score 62.21   End of Consult   Education Provided Yes   Patient Position at End of Consult Bedside chair;All needs within reach   Nurse Communication Nurse aware of consult       Stacy Swain MS, OTR/L

## 2024-10-30 NOTE — PHYSICAL THERAPY NOTE
Physical Therapy Evaluation    Patient Name: Darin Bullock    Today's Date: 10/30/2024     Problem List  Active Problems:    Bowel obstruction (HCC)       Past Medical History  History reviewed. No pertinent past medical history.     Past Surgical History  History reviewed. No pertinent surgical history.        10/30/24 0833   PT Last Visit   PT Visit Date 10/30/24   Note Type   Note type Evaluation   Pain Assessment   Pain Assessment Tool 0-10   Pain Score 2   Pain Location/Orientation Orientation: Bilateral;Location: Abdomen   Hospital Pain Intervention(s) Repositioned;Ambulation/increased activity   Restrictions/Precautions   Weight Bearing Precautions Per Order No   Other Precautions Multiple lines;Fall Risk;Pain   Home Living   Type of Home House   Home Layout Two level;Bed/bath upstairs  (0 CAPRICE)   Bathroom Shower/Tub Tub/shower unit   Bathroom Toilet Standard   Bathroom Equipment Other (Comment)  (denies DME)   Home Equipment Other (Comment)  (denies DME)   Prior Function   Level of [place] Independent with ADLs;Independent with functional mobility;Independent with IADLS   Lives With Significant other   Receives Help From Family   IADLs Independent with driving;Independent with meal prep;Independent with medication management   Falls in the last 6 months 0   Vocational Full time employment   Comments (+) driving, works as a musician   General   Family/Caregiver Present No   Cognition   Overall Cognitive Status WFL   Arousal/Participation Alert   Attention Within functional limits   Orientation Level Oriented X4   Memory Within functional limits   Following Commands Follows all commands and directions without difficulty   Comments Pt very pleasant and cooperative   RLE Assessment   RLE Assessment WFL   LLE Assessment   LLE Assessment WFL   Bed Mobility   Supine to Sit 5  Supervision   Additional items Increased time required;HOB elevated   Sit to  Supine Unable to assess   Additional Comments pt supine in bed upon arrival, pt left sitting in chair following PT session   Transfers   Sit to Stand 5  Supervision   Additional items Increased time required   Stand to Sit 5  Supervision   Additional items Increased time required   Additional Comments transfers w/ RW   Ambulation/Elevation   Gait pattern Narrow LOWELL;Short stride;Decreased heel strike   Gait Assistance 5  Supervision   Additional items Verbal cues   Assistive Device Rolling walker;None   Distance 200' w/ RW + 50' w/o AD   Balance   Static Sitting Good   Dynamic Sitting Fair +   Static Standing Fair +   Dynamic Standing Fair   Ambulatory Fair   Endurance Deficit   Endurance Deficit No   Activity Tolerance   Activity Tolerance Patient tolerated treatment well   Medical Staff Made Aware OT Stacy- co eval 2/2 medicla complexity   Nurse Made Aware RN cleared   Assessment   Prognosis Good   Problem List Pain;Decreased skin integrity   Assessment Pt seen for moderate (evolving) complexity PT evaluation. Pt with active PT eval/treat and up and OOB orders. Pt is a 54 y.o. male who was admitted to [facility] on 10/23 with primary dx of partially obstructing adenocarcinoma in descending/sigmoid colon, s/p 10/24 GI sigmoid metal stent. Now s/p 10/29 lap hand-assisted L hemicolectomy. Pt  has no past medical history on file. .     PT consulted to assess d/c needs. At baseline, pt resides with spouse in 2SH and was IND w/o AD prior to hospital admission. Currently, upon initial examination, pt  is requiring SUP A for bed mobility skills; SUP for functional transfers and SUP for ambulation with RW and w/o AD. Pt was left sitting at the end of PT session with all needs in reach. Pt with no questions or concerns regarding d/c home; appears to be functioning at/ near baseline mobility levels.     Pt with no further acute inpatient PT needs at this time- please re-consult if needed. PT to DC pt and recommends  home w/ no rehab needs. Encourage pt to ambulate at least 3-4x/day with restorative/ nursing staff while remaining in hospital. The patient's AM-PAC Basic Mobility Inpatient Short Form Raw Score is 24, Standardized Score is 57.68. A standardized score greater than 42.9 suggests the patient may benefit from discharge to home. Please also refer to the recommendation of the Physical Therapist for safe discharge planning.   Barriers to Discharge None   Goals   Patient Goals to go home   Plan   Treatment/Interventions Spoke to nursing;Spoke to case management   PT Frequency Other (Comment)  (PT eval only- DC IPPT)   Discharge Recommendation   Rehab Resource Intensity Level, PT No post-acute rehabilitation needs   AM-PAC Basic Mobility Inpatient   Turning in Flat Bed Without Bedrails 4   Lying on Back to Sitting on Edge of Flat Bed Without Bedrails 4   Moving Bed to Chair 4   Standing Up From Chair Using Arms 4   Walk in Room 4   Climb 3-5 Stairs With Railing 4   Basic Mobility Inpatient Raw Score 24   Basic Mobility Standardized Score 57.68   [facility] Highest Level Of Mobility   JH-HLM Goal 8: Walk 250 feet or more   JH-HLM Achieved 8: Walk 250 feet ot more   Modified Nyla Scale   Modified [place] Scale 2   End of Consult   Patient Position at End of Consult Bed/Chair alarm activated;All needs within reach;Bedside chair     Caitlyn Schafer PT, DPT

## 2024-10-30 NOTE — ANESTHESIA POSTPROCEDURE EVALUATION
Post-Op Assessment Note    CV Status:  Stable  Pain Score: 0    Pain management: adequate       Mental Status:  Sleepy and arousable   Hydration Status:  Euvolemic   PONV Controlled:  Controlled   Airway Patency:  Patent     Post Op Vitals Reviewed: Yes    No anethesia notable event occurred.    Staff: CRNA   Comments: Pt able to maintain own airway, VSS, report to recovery RN          Last Filed PACU Vitals:  Vitals Value Taken Time   Temp 98.3 °F (36.8 °C) 10/29/24 2008   Pulse 70 10/29/24 2007   /60 10/29/24 2007   Resp 20 10/29/24 2007   SpO2 94% RA    Vitals shown include unfiled device data.    Modified Doron:  Activity: 2 (10/29/2024  8:45 PM)  Respiration: 2 (10/29/2024  8:45 PM)  Circulation: 2 (10/29/2024  8:45 PM)  Consciousness: 2 (10/29/2024  8:45 PM)  Oxygen Saturation: 1 (10/29/2024  8:45 PM)  Modified Doron Score: 9 (10/29/2024  8:45 PM)

## 2024-10-30 NOTE — QUICK NOTE
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  Now s/p 10/29 lap hand-assisted L hemicolectomy.     Subjective:  Pt's pain is controlled. Denies nausea, chest pain, SOB.    Objective:  General: NAD  CV: nl rate  Lungs: nl wob. No resp distress.  ABD: Soft, ND, NT, CDI. Ostomy with minimal ss output.  Extrem: No CCE  [place]    Patient Vitals for the past 24 hrs:   BP Temp Pulse Resp SpO2   10/29/24 2207 124/72 97.7 °F (36.5 °C) 80 16 99 %   10/29/24 2104 119/71 97.7 °F (36.5 °C) 72 16 99 %   10/29/24 2104 -- -- 72 -- 96 %   10/29/24 2045 110/63 -- 71 18 98 %   10/29/24 2030 -- -- 72 18 98 %   10/29/24 2015 107/63 -- 70 18 98 %   10/29/24 2008 107/60 98.3 °F (36.8 °C) 71 18 94 %   10/29/24 1502 115/87 98 °F (36.7 °C) 82 20 95 %   10/29/24 0812 107/86 98.3 °F (36.8 °C) 84 16 97 %   10/28/24 2249 136/94 98.3 °F (36.8 °C) 88 16 95 %

## 2024-10-30 NOTE — PLAN OF CARE
Problem: GASTROINTESTINAL - ADULT  Goal: Maintains or returns to baseline bowel function  Description: INTERVENTIONS:  - Assess bowel function  - Encourage oral fluids to ensure adequate hydration  - Administer IV fluids if ordered to ensure adequate hydration  - Administer ordered medications as needed  - Encourage mobilization and activity  - Consider nutritional services referral to assist patient with adequate nutrition and appropriate food choices  Outcome: Progressing  Goal: Maintains adequate nutritional intake  Description: INTERVENTIONS:  - Monitor percentage of each meal consumed  - Identify factors contributing to decreased intake, treat as appropriate  - Assist with meals as needed  - Monitor I&O, weight, and lab values if indicated  - Obtain nutrition services referral as needed  Outcome: Progressing     Problem: METABOLIC, FLUID AND ELECTROLYTES - ADULT  Goal: Electrolytes maintained within normal limits  Description: INTERVENTIONS:  - Monitor labs and assess patient for signs and symptoms of electrolyte imbalances  - Administer electrolyte replacement as ordered  - Monitor response to electrolyte replacements, including repeat lab results as appropriate  - Instruct patient on fluid and nutrition as appropriate  Outcome: Progressing     Problem: PAIN - ADULT  Goal: Verbalizes/displays adequate comfort level or baseline comfort level  Description: Interventions:  - Encourage patient to monitor pain and request assistance  - Assess pain using appropriate pain scale  - Administer analgesics based on type and severity of pain and evaluate response  - Implement non-pharmacological measures as appropriate and evaluate response  - Consider cultural and social influences on pain and pain management  - Notify physician/advanced practitioner if interventions unsuccessful or patient reports new pain  Outcome: Progressing     Problem: INFECTION - ADULT  Goal: Absence or prevention of progression during  hospitalization  Description: INTERVENTIONS:  - Assess and monitor for signs and symptoms of infection  - Monitor lab/diagnostic results  - Monitor all insertion sites, i.e. indwelling lines, tubes, and drains  - Monitor endotracheal if appropriate and nasal secretions for changes in amount and color  - [place] appropriate cooling/warming therapies per order  - Administer medications as ordered  - Instruct and encourage patient and family to use good hand hygiene technique  - Identify and instruct in appropriate isolation precautions for identified infection/condition  Outcome: Progressing     Problem: DISCHARGE PLANNING  Goal: Discharge to home or other facility with appropriate resources  Description: INTERVENTIONS:  - Identify barriers to discharge w/patient and caregiver  - Arrange for needed discharge resources and transportation as appropriate  - Identify discharge learning needs (meds, wound care, etc.)  - Arrange for interpretive services to assist at discharge as needed  - Refer to Case Management Department for coordinating discharge planning if the patient needs post-hospital services based on physician/advanced practitioner order or complex needs related to functional status, cognitive ability, or social support system  Outcome: Progressing     Problem: Knowledge Deficit  Goal: Patient/family/caregiver demonstrates understanding of disease process, treatment plan, medications, and discharge instructions  Description: Complete learning assessment and assess knowledge base.  Interventions:  - Provide teaching at level of understanding  - Provide teaching via preferred learning methods  Outcome: Progressing

## 2024-10-30 NOTE — OP NOTE
OPERATIVE REPORT  PATIENT NAME: Darin Bullock    :  1970  MRN: [id number]  Pt Location: BE OR ROOM 10    SURGERY DATE: 10/29/2024    Surgeons and Role:     * EVERTON Llanos MD - Primary     * Homero Pearson MD - Assisting    Preop Diagnosis:  Colonic mass K63.89  Large bowel obstruction    Post-Op Diagnosis Codes:     * Colonic mass [K63.89]  Large bowel obstruction    Procedure(s):  LEFT EXTENDED COLECTOMY LAPAROSCOPIC- HAND ASSISTED.   LAPAROSCOPIC MOBILIZATION OF SPLENIC FLEXURE  CREATION DESENDING COLOSTOMY  SIGMOIDOSCOPY FLEXIBLE    Specimen(s):  ID Type Source Tests Collected by Time Destination   1 : sigmoid colon with stent Tissue Large Intestine, Sigmoid Colon TISSUE EXAM EVERTON Llanos MD 10/29/2024 1829        Estimated Blood Loss:   50 mL    Drains:  Urethral Catheter Non-latex 16 Fr. (Active)   Number of days: 0       Anesthesia Type:   General    Operative Indications:  Colonic mass K63.89  Large bowel obstruction    Operative Findings:  Stent in sigmoid colon mass  Redundant sigmoid  Thickened descending colon wall and mildly large descending colon and rectum.    Complications:   None    Procedure and Technique:  The patient was in a supine position on a pink pad.  His arms were cushioned with pink pad material and tucked at his sides.  The chest was wrapped with a pink pad wrap.  The legs were in Ramon boots.  The abdomen was shaved with electric razor.  It was prepped using ChloraPrep and allowed to dry completely.  It was draped in a sterile manner.  A timeout was done.    A right supraumbilical 11 mm trocar was positioned using Visiport technique without difficulty.  We then placed right lower quadrant 12 mm trocar and a right lateral 5 mm trocar and a left lateral 5 mm trocar.  Each was placed after incising the skin with cautery.  Laparoscopic dissection was used to dissected the entire sigmoid colon up to the descending colon.  At that point, for safety and completeness, a lower  midline incision was created.  The skin was incised using cautery down to the level of the fascia.  The fascia was opened in the midline.  The abdomen was penetrated and the GelPort was positioned in the midline wound.  This allowed for hand-assisted technique.    The first dissection was medial to the sigmoid colon.  The left ureter was identified and avoided during this dissection.  Lateral dissection was done and the ureter was reidentified.  The sigmoid mesentery was  from retroperitoneal structures and the avascular plane.  The superior portion of the retrorectal space was entered at the sacral promontory, only.  The dissection was carried laterally up the descending colon and the bowel was  from the retroperitoneal structures and Gerota's fascia.  The ureter was avoided during this entire process.    Hand assist technique was then used after the GelPort was mentioned as above.  This allowed for lateral dissection of the splenic flexure onto the distalmost transverse colon.  Medial dissection of the splenic flexure was not done and the inferior mesenteric artery was left intact.  The superior rectal artery was divided but the left colic artery was left intact.  This was due to the finding of the very long and floppy sigmoid colon with a mid sigmoid mass and stent in position.  I felt the descending colon could be brought to the pelvis without difficulty and with a wide margin on the mass by dividing at the sigmoid/descending colon junction.    The mesentery of the rectum was then divided using Enseal technique.  The bowel was skeletonized and at the sacral promontory level, the rectum was transected at the sigmoid junction.  This was done using a 60 mm green loaded Westervelt stapler.  3 fires were required to complete this transection.  The rectum was very wide.    Through the GelPort, with the GelPort acting as a wound protector, the sigmoid colon was delivered up to the level of the  transection of the mesentery near the superior rectal artery takeoff from the inferior mesenteric artery.  The bowel was skeletonized at that level and pursestring device was used to position a 2-0 Prolene pursestring.  It was notable that the pursestring device did not completely cover the bowel given its very wide lumen.  Additionally, the bowel was seen to be somewhat thickened on the descending colon.  The pursestring was placed in the anvil of a 31 mm CDH stapler was positioned in the proximal side of the anastomosis.  This was tied into position using a full-thickness wrap of 2-0 Prolene placed in a double wrap technique.  The specimen was sent for pathologic evaluation after transection.    The bowel was cleansed and returned to the abdominal cavity.  Gloves were changed.  A double stapled anastomosis was then created.  This was done without any difficulties.  However, there were 2 anterior leaks at the staple line.  This appeared to be fracturing of the tissues of the proximal side of the colon.  I did not attempt to oversew or do any other treatment.  Instead, I ran another fire of the stapler just distal to the staple line created.  The rectal stump was then oversewn using 2-0 Vicryl suture to cover much of the staple line just to be sure that there was no stump leak.  There was no leakage of stool.    Because of the thickening of the proximal bowel and the recent history of obstruction, I surmised that the thickening may be due to chronic obstruction that the patient was not perceiving.  It was noted that he only had symptoms for 1 week before presentation.  I had thought that the 5-day delay from stenting to surgery was adequate but this may not be the case.  In any case, I felt that another attempt at stapling would only increase his risk of failure and removal of more rectum.  I felt the best option was to create a colostomy, give the bowel time to rest, and return for closure of the colostomy in the  future.  I recognize that this is potentially increased risk for the patient but a bad anastomosis would not be tolerated well either.    A previously marked site was then grasped with a [place] clamp and a disc of skin was removed.  Dissection was carried down to the fascia which was opened horizontally and a muscle-splitting incision was created.  The descending colon was brought through that opening as a colostomy.  It was kept into position while we closed the other wounds.  The trocars and the GelPort were removed.    The right lower quadrant trocar site was closed using interrupted 0 Vicryl sutures.  The wounds were cleansed and dried.  The lower midline fascia was closed at the GelPort site using running 1 PDS suture placed from either end of the fascia.  The wound was again cleansed and dried.  The skin was then closed using running or interrupted subcuticular 4-0 Monocryl sutures depending on the length of the wounds.  The wounds were cleansed and dried and Exofin was applied.    Attention was then directed to the colostomy.  The colostomy was matured using 3-0 chromic suture at 3 corners followed by straight sutures with full-thickness bowel to the dermal sutures used.  A nice bud of the well-vascularized colostomy was created.  It was a very large colostomy due to the thickness of the bowel wall.    I discussed the treatment with the patient and significant other.  I reassured her that the cancer was well treated and the patient will do well until the time of colostomy closure.    Sponge needle and instrument counts were correct.  Wand technique revealed no retained gauze.    I was present for the entire procedure.    Patient Disposition:  PACU     Colon Resection  Operation performed with curative intent Yes   Tumor Location (select all that apply) Sigmoid colon   Extent of colon and vascular resection (select all that apply) Left hemicolectomy - inferior mesenteric               SIGNATURE: GOLDIE Myers  MD Viet  DATE: October 29, 2024  TIME: 8:14 PM

## 2024-10-30 NOTE — ANESTHESIA POSTPROCEDURE EVALUATION
Post-Op Assessment Note    CV Status:  Stable  Pain Score: 0    Pain management: adequate       Mental Status:  Sleepy and arousable   Hydration Status:  Euvolemic   PONV Controlled:  Controlled   Airway Patency:  Patent     Post Op Vitals Reviewed: Yes    No anethesia notable event occurred.    Staff: CRNA   Comments: Pt able to maintain own airway, VSS, report to recovery RN          Last Filed PACU Vitals:  Vitals Value Taken Time   Temp 98.3 °F (36.8 °C) 10/29/24 2008   Pulse 70 10/29/24 2007   /60 10/29/24 2007   Resp 20 10/29/24 2007   SpO2 94% RA    Vitals shown include unfiled device data.    Modified Doron:  No data recorded

## 2024-10-30 NOTE — UTILIZATION REVIEW
Continued Stay Review    Date: 10-30-24                          Current Patient Class: Inpatient  Current Level of Care: M/S    HPI:54 y.o. male initially admitted on 10-23-24     Assessment/Plan: POD # 1  lap hand-assisted L hemicolectomy, end colostomy. On exam Soft, ND, mild lower tenderness, CDI. Ostomy pink, thick, with ~15cc ss in bag. Plan diet clear/toast and crackers IVF, IV abx IV PPI, d/c mackenzie maintain PCA po pain medication encourage to ambulate, SCD and supportive care     OR 10-29-24  Procedure(s):  LEFT EXTENDED COLECTOMY LAPAROSCOPIC- HAND ASSISTED.   LAPAROSCOPIC MOBILIZATION OF SPLENIC FLEXURE  CREATION DESENDING COLOSTOMY  SIGMOIDOSCOPY FLEXIBLE  Anesthesia Type:   General  Operative Findings:  Stent in sigmoid colon mass  Redundant sigmoid  Thickened descending colon wall and mildly large descending colon and rectum.          Medications:   Scheduled Medications:  acetaminophen, 650 mg, Oral, Q6H VANDANA  cefazolin, 2,000 mg, Intravenous, Q8H  gabapentin, 100 mg, Oral, TID  heparin (porcine), 5,000 Units, Subcutaneous, Q8H VANDANA  melatonin, 3 mg, Oral, HS  methocarbamol, 500 mg, Oral, Q6H VANDANA  metroNIDAZOLE, 500 mg, Intravenous, Q8H  pantoprazole, 40 mg, Intravenous, Q24H VANDANA      Continuous IV Infusions:  dextrose 5 % and sodium chloride 0.45 % with KCl 20 mEq/L, 84 mL/hr, Intravenous, Continuous  HYDROmorphone, , Intravenous, Continuous      PRN Meds:  hydrALAZINE, 10 mg, Intravenous, Q8H PRN  HYDROmorphone, 0.5 mg, Intravenous, Q4H PRN  HYDROmorphone, 0.2 mg, Intravenous, Q4H PRN  labetalol, 10 mg, Intravenous, Q4H PRN  ondansetron, 4 mg, Intravenous, Q4H PRN  oxyCODONE, 10 mg, Oral, Q4H PRN  oxyCODONE, 5 mg, Oral, Q4H PRN      Discharge Plan: TBD      Vital Signs (last 3 days)       Date/Time Temp Pulse Resp BP MAP (mmHg) SpO2 Calculated FIO2 (%) - Nasal Cannula Nasal Cannula O2 Flow Rate (L/min) O2 Device Cardiac (WDL) Patient Position - Orthostatic VS Anshu Coma Scale Score Pain    10/30/24  11:01:22 97.6 °F (36.4 °C) -- 16 108/69 82 -- -- -- -- -- -- -- --    10/30/24 0832 -- -- -- -- -- -- -- -- -- -- -- -- 2    10/30/24 07:44:47 98.3 °F (36.8 °C) 85 14 106/70 82 95 % -- -- -- -- -- -- 4    10/30/24 06:43:03 98.2 °F (36.8 °C) 82 16 111/70 84 96 % -- -- -- -- -- -- 4    10/30/24 04:51:37 -- 87 -- 100/66 77 97 % -- -- -- -- -- -- --    10/30/24 0308 -- -- -- 98/64 -- -- -- -- -- -- Lying -- --    10/30/24 03:02:19 98.7 °F (37.1 °C) 84 16 97/65 76 97 % -- -- -- -- -- -- 5    10/29/24 23:59:53 98.1 °F (36.7 °C) 94 16 121/74 90 98 % -- -- -- -- -- -- 6    10/29/24 23:11:50 97.5 °F (36.4 °C) 89 16 122/73 89 99 % -- -- -- -- -- -- 5    10/29/24 22:07:11 97.7 °F (36.5 °C) 80 16 124/72 89 99 % -- -- -- -- -- -- 6    10/29/24 2110 -- -- -- -- -- -- -- -- -- -- -- 15 --    10/29/24 21:04:53 97.7 °F (36.5 °C) 72 16 119/71 87 99 % -- -- -- -- -- -- 7    10/29/24 2104 -- 72 -- -- -- 96 % -- -- -- -- -- -- --    10/29/24 2045 -- 71 18 110/63 79 98 % 32 3 L/min Nasal cannula -- -- -- No Pain    10/29/24 2030 -- 72 18 -- -- 98 % 32 3 L/min Nasal cannula WDL -- -- No Pain    10/29/24 2015 -- 70 18 107/63 80 98 % 32 3 L/min Nasal cannula -- -- -- No Pain    10/29/24 2008 98.3 °F (36.8 °C) 71 18 107/60 78 94 % 32 3 L/min Nasal cannula WDL -- -- No Pain    10/29/24 15:02:28 98 °F (36.7 °C) 82 20 115/87 96 95 % -- -- -- -- -- -- --    10/29/24 08:12:34 98.3 °F (36.8 °C) 84 16 107/86 93 97 % -- -- -- -- -- -- --    10/29/24 0722 -- -- -- -- -- -- -- -- -- -- -- -- No Pain    10/28/24 22:49:49 98.3 °F (36.8 °C) 88 16 136/94 108 95 % -- -- -- -- -- -- --    10/28/24 1729 -- -- -- -- -- -- -- -- -- -- -- -- No Pain    10/28/24 15:38:52 98.1 °F (36.7 °C) 86 16 136/93 107 94 % -- -- -- -- -- -- --    10/28/24 1153 -- -- -- -- -- -- -- -- -- -- -- -- No Pain    10/28/24 0926 -- -- -- -- -- -- -- -- -- -- -- 15 No Pain    10/28/24 07:36:18 97.5 °F (36.4 °C) 78 16 139/95 110 95 % -- -- -- -- -- -- --    10/27/24 22:29:06 97.4 °F  (36.3 °C) 86 18 140/96 111 94 % -- -- -- -- -- -- --    10/27/24 1955 -- -- -- -- -- -- -- -- -- -- -- 15 --    10/27/24 15:56:13 -- 90 -- 139/97 111 95 % -- -- -- -- -- -- --    10/27/24 0900 -- -- -- -- -- -- -- -- None (Room air) -- -- 15 No Pain    10/27/24 07:06:36 97.9 °F (36.6 °C) 81 16 148/100 116 95 % -- -- -- -- -- -- --          Weight (last 2 days)       None            Pertinent Labs/Diagnostic Results:   Radiology:  XR abdomen 1 view kub   Final Interpretation by Will Koo DO (10/26 1609)      Metallic stent in the left upper pelvis as above.      Limitations above. Air-filled, distended small bowel loops in the central abdomen are unchanged from previous studies.               Workstation performed: [id number]         XR abdomen 1 view kub   Final Interpretation by Stephen Stevenson MD (10/25 0701)      Fluoroscopy provided for procedure guidance.      Please refer to the separate procedure note for additional details.                           Workstation performed: [id number]           Cardiology:  No orders to display     GI:  Colonoscopy Fluoro   Preliminary Result by Jay Jay Dong DO (10/25 0856)   Malignant appearing mass causing stricture in sigmoid colon biopsied.   22 Sinhala by 90 mm uncovered metal stent was placed successfully across    stricture.         RECOMMENDATION:   Await pathology results    Return to floor.   Further management per colorectal surgery.                     Collin Hutchins MD                  Results from last 7 days   Lab Units 10/30/24  0455 10/29/24  0442 10/25/24  0435 10/24/24  0626   WBC Thousand/uL 9.42 7.58 6.06 8.96   HEMOGLOBIN g/dL 12.6 14.2 13.4 15.2   HEMATOCRIT % 37.5 42.4 40.3 44.9   PLATELETS Thousands/uL 281 317 239 317   TOTAL NEUT ABS Thousands/µL 7.64* 5.00 4.01 6.61         Results from last 7 days   Lab Units 10/30/24  0455 10/29/24  0442 10/28/24  0444 10/27/24  0434 10/26/24  0524   SODIUM mmol/L 135 138 138 137 134*   POTASSIUM  mmol/L 4.9 3.9 3.6 3.9 3.9   CHLORIDE mmol/L 103 106 103 104 101   CO2 mmol/L 22 23 25 23 24   ANION GAP mmol/L 10 9 10 10 9   BUN mg/dL 11 11 15 13 13   CREATININE mg/dL 1.18 1.03 1.07 0.99 1.19   EGFR ml/min/1.73sq m 68 80 77 84 67   CALCIUM mg/dL 8.3* 8.1* 8.4 8.6 8.4   MAGNESIUM mg/dL 2.0 1.9 2.1  --   --    PHOSPHORUS mg/dL  --  3.3  --   --   --              Results from last 7 days   Lab Units 10/30/24  0455 10/29/24  0442 10/28/24  0444 10/27/24  0434 10/26/24  0524 10/25/24  0435 10/24/24  0626   GLUCOSE RANDOM mg/dL 122 108 104 94 116 109 139             Beta- Hydroxybutyrate   Date Value Ref Range Status   10/22/2024 1.64 (H) 0.02 - 0.27 mmol/L Final                                                                  Results from last 7 days   Lab Units 10/30/24  0812   UNIT PRODUCT CODE  [id number]  [id number]   UNIT NUMBER  [id number]  [id number]   UNITABO  O  O   UNITRH  POS  POS   CROSSMATCH  Compatible  Compatible   UNIT DISPENSE STATUS  Return to Inv  Return to Inv   UNIT PRODUCT VOL mL 350  350                                                                           Network Utilization Review Department  ATTENTION: Please call with any questions or concerns to [phone] and carefully listen to the prompts so that you are directed to the right person. All voicemails are confidential.   For Discharge needs, contact Care Management DC Support Team at [phone] opt. 2  Send all requests for admission clinical reviews, approved or denied determinations and any other requests to dedicated fax number below belonging to the campus where the patient is receiving treatment. List of dedicated fax numbers for the Facilities:  FACILITY NAME UR FAX NUMBER   ADMISSION DENIALS (Administrative/Medical Necessity) [phone]   DISCHARGE SUPPORT TEAM (NETWORK) [phone]   PARENT CHILD HEALTH (Maternity/NICU/Pediatrics) [phone]   [facility] [phone]   [facility]  [facility] [phone]   [facility] [phone]   [facility] [phone]   [facility] [phone]   [facility] [phone]   [facility] [phone]   [facility] [phone]   [facility] [phone]   [facility] [phone]   [facility] [phone]   [facility] [phone]

## 2024-10-30 NOTE — WOUND OSTOMY CARE
"Progress Note- Ostomy  Darin Bullock 54 y.o. male  [id number]  [id number]        Assessment:  Patient underwent colostomy formation yesterday 10/29/2024. Patient is POD 1. Patient seen today for introduction of WOC care team and their roles after their surgery. Patient reports soreness but otherwise feels okay. Patient is still on PCA pump. Provided teaching materials, \"what to expect after your colostomy surgery\".  Patient aggreeable to plan of care. RN told patient to come up with questions and read the materials over the day/evening and that we will return tomorrow morning for first bag change teaching.    Wound will continue to follow patient and will see patient tomorrow morning. Supplies left at bedside for patient.       LLQ Stoma appearance:     Stoma: appears red in color and budded, coloplast flat one piece pouch in place - no signs of leakage.  Bag appears to be adhered properly. Scant sanguinous drainage noted in pouch. No stool noted at this time.               Viky Castellanos RN, BSN, CWOCN   "

## 2024-10-31 LAB
ANION GAP SERPL CALCULATED.3IONS-SCNC: 4 MMOL/L (ref 4–13)
BASOPHILS # BLD AUTO: 0.02 THOUSANDS/ΜL (ref 0–0.1)
BASOPHILS NFR BLD AUTO: 0 % (ref 0–1)
BUN SERPL-MCNC: 10 MG/DL (ref 5–25)
CALCIUM SERPL-MCNC: 8.3 MG/DL (ref 8.4–10.2)
CHLORIDE SERPL-SCNC: 104 MMOL/L (ref 96–108)
CO2 SERPL-SCNC: 28 MMOL/L (ref 21–32)
CREAT SERPL-MCNC: 1 MG/DL (ref 0.6–1.3)
EOSINOPHIL # BLD AUTO: 0.09 THOUSAND/ΜL (ref 0–0.61)
EOSINOPHIL NFR BLD AUTO: 1 % (ref 0–6)
ERYTHROCYTE [DISTWIDTH] IN BLOOD BY AUTOMATED COUNT: 12.1 % (ref 11.6–15.1)
GFR SERPL CREATININE-BSD FRML MDRD: 83 ML/MIN/1.73SQ M
GLUCOSE SERPL-MCNC: 120 MG/DL (ref 65–140)
HCT VFR BLD AUTO: 38.2 % (ref 36.5–49.3)
HGB BLD-MCNC: 12.5 G/DL (ref 12–17)
IMM GRANULOCYTES # BLD AUTO: 0.01 THOUSAND/UL (ref 0–0.2)
IMM GRANULOCYTES NFR BLD AUTO: 0 % (ref 0–2)
LYMPHOCYTES # BLD AUTO: 1.15 THOUSANDS/ΜL (ref 0.6–4.47)
LYMPHOCYTES NFR BLD AUTO: 17 % (ref 14–44)
MCH RBC QN AUTO: 31.2 PG (ref 26.8–34.3)
MCHC RBC AUTO-ENTMCNC: 32.7 G/DL (ref 31.4–37.4)
MCV RBC AUTO: 95 FL (ref 82–98)
MONOCYTES # BLD AUTO: 1.12 THOUSAND/ΜL (ref 0.17–1.22)
MONOCYTES NFR BLD AUTO: 16 % (ref 4–12)
NEUTROPHILS # BLD AUTO: 4.44 THOUSANDS/ΜL (ref 1.85–7.62)
NEUTS SEG NFR BLD AUTO: 66 % (ref 43–75)
NRBC BLD AUTO-RTO: 0 /100 WBCS
PLATELET # BLD AUTO: 292 THOUSANDS/UL (ref 149–390)
PMV BLD AUTO: 9.1 FL (ref 8.9–12.7)
POTASSIUM SERPL-SCNC: 4 MMOL/L (ref 3.5–5.3)
RBC # BLD AUTO: 4.01 MILLION/UL (ref 3.88–5.62)
SODIUM SERPL-SCNC: 136 MMOL/L (ref 135–147)
WBC # BLD AUTO: 6.83 THOUSAND/UL (ref 4.31–10.16)

## 2024-10-31 PROCEDURE — 99024 POSTOP FOLLOW-UP VISIT: CPT | Performed by: COLON & RECTAL SURGERY

## 2024-10-31 PROCEDURE — 80048 BASIC METABOLIC PNL TOTAL CA: CPT

## 2024-10-31 PROCEDURE — 85025 COMPLETE CBC W/AUTO DIFF WBC: CPT

## 2024-10-31 RX ORDER — PANTOPRAZOLE SODIUM 40 MG/1
40 TABLET, DELAYED RELEASE ORAL
Status: DISCONTINUED | OUTPATIENT
Start: 2024-11-01 | End: 2024-11-03 | Stop reason: HOSPADM

## 2024-10-31 RX ADMIN — METRONIDAZOLE 500 MG: 500 INJECTION, SOLUTION INTRAVENOUS at 18:08

## 2024-10-31 RX ADMIN — ACETAMINOPHEN 650 MG: 325 TABLET, FILM COATED ORAL at 17:00

## 2024-10-31 RX ADMIN — OXYCODONE HYDROCHLORIDE 5 MG: 5 TABLET ORAL at 08:22

## 2024-10-31 RX ADMIN — HEPARIN SODIUM 5000 UNITS: 5000 INJECTION INTRAVENOUS; SUBCUTANEOUS at 13:50

## 2024-10-31 RX ADMIN — GABAPENTIN 100 MG: 100 CAPSULE ORAL at 08:22

## 2024-10-31 RX ADMIN — CEFAZOLIN SODIUM 2000 MG: 2 SOLUTION INTRAVENOUS at 17:01

## 2024-10-31 RX ADMIN — METHOCARBAMOL 500 MG: 500 TABLET ORAL at 23:40

## 2024-10-31 RX ADMIN — ACETAMINOPHEN 650 MG: 325 TABLET, FILM COATED ORAL at 23:40

## 2024-10-31 RX ADMIN — GABAPENTIN 100 MG: 100 CAPSULE ORAL at 17:01

## 2024-10-31 RX ADMIN — CEFAZOLIN SODIUM 2000 MG: 2 SOLUTION INTRAVENOUS at 08:22

## 2024-10-31 RX ADMIN — HEPARIN SODIUM 5000 UNITS: 5000 INJECTION INTRAVENOUS; SUBCUTANEOUS at 21:02

## 2024-10-31 RX ADMIN — METHOCARBAMOL 500 MG: 500 TABLET ORAL at 11:07

## 2024-10-31 RX ADMIN — METRONIDAZOLE 500 MG: 500 INJECTION, SOLUTION INTRAVENOUS at 00:42

## 2024-10-31 RX ADMIN — PANTOPRAZOLE SODIUM 40 MG: 40 INJECTION, POWDER, FOR SOLUTION INTRAVENOUS at 08:23

## 2024-10-31 RX ADMIN — METHOCARBAMOL 500 MG: 500 TABLET ORAL at 17:00

## 2024-10-31 RX ADMIN — GABAPENTIN 100 MG: 100 CAPSULE ORAL at 21:02

## 2024-10-31 RX ADMIN — OXYCODONE HYDROCHLORIDE 5 MG: 5 TABLET ORAL at 16:09

## 2024-10-31 RX ADMIN — HEPARIN SODIUM 5000 UNITS: 5000 INJECTION INTRAVENOUS; SUBCUTANEOUS at 05:49

## 2024-10-31 RX ADMIN — Medication 3 MG: at 21:02

## 2024-10-31 RX ADMIN — ACETAMINOPHEN 650 MG: 325 TABLET, FILM COATED ORAL at 11:07

## 2024-10-31 RX ADMIN — METHOCARBAMOL 500 MG: 500 TABLET ORAL at 05:49

## 2024-10-31 RX ADMIN — METRONIDAZOLE 500 MG: 500 INJECTION, SOLUTION INTRAVENOUS at 09:26

## 2024-10-31 RX ADMIN — ACETAMINOPHEN 650 MG: 325 TABLET, FILM COATED ORAL at 05:49

## 2024-10-31 NOTE — PROGRESS NOTES
Patient:    MRN:  [id number]    aFm Request ID:  [id number]    Level of care reserved:  Home Health Agency    Partner Reserved:  [facility], [place] [address] [phone]    Clinical needs requested:    Geography searched:  [phone]    Start of Service:    Request sent:  9:36am EDT on 10/31/2024 by Hope Dempsey    Partner reserved:  9:57am EDT on 10/31/2024 by Hope Dempsey    Choice list shared:  9:57am EDT on 10/31/2024 by Hope Dempsey

## 2024-10-31 NOTE — PROGRESS NOTES
Progress Note - Colorectal   Name: Darin Bullock 54 y.o. 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  Now s/p 10/29 lap hand-assisted L hemicolectomy, end colostomy.    Plan:  -advance diet to low res  -d/c IVF  -d/c PCA, PO pain meds  -DVT ppx, IS, OOB/ambulate  -ostomy teaching      Subjective/Objective   Patient seen and examined bedside.  No overnight events.  Pain well controlled.  OOB.  Tolerating clears. No n/v  Minimal bowel sweat from ostomy.    Physical Exam:  General - no acute distress, responsive and cooperative  CV - warm, regular rate  Pulm - normal work of breathing, no respiratory distress  Abd - soft, nondistended, ostomy viable w/bowel sweat in bag  Neuro - m/s grossly intact, cn grossly intact  Ext - moving all extremities      Patient Vitals for the past 24 hrs:   BP Temp Temp src Pulse Resp SpO2   10/31/24 0214 108/71 98.7 °F (37.1 °C) -- 80 18 95 %   10/30/24 2303 112/71 98.7 °F (37.1 °C) -- 84 19 96 %   10/30/24 1850 110/70 (!) 97.3 °F (36.3 °C) -- 79 19 93 %   10/30/24 1446 105/70 97.6 °F (36.4 °C) -- 85 16 96 %   10/30/24 1101 108/69 97.6 °F (36.4 °C) -- -- 16 --   10/30/24 0744 106/70 98.3 °F (36.8 °C) Oral 85 14 95 %       I/O         10/27 0701  10/28 0700 10/28 0701  10/29 0700 10/29 0701  10/30 0700    P.O. 298      I.V. (mL/kg)  1020 (12)     IV Piggyback   200    Total Intake(mL/kg) 298 (3.5) 1020 (12) 200 (2.4)    Urine (mL/kg/hr)       Stool       Total Output       Net +298 +1020 +200           Unmeasured Urine Occurrence   2 x    Unmeasured Stool Occurrence   1 x            Recent Labs     10/29/24  0442 10/30/24  0455 10/31/24  0556   WBC 7.58 9.42 6.83   HGB 14.2 12.6 12.5    281 292   SODIUM 138 135 136   K 3.9 4.9 4.0    103 104   CO2  23 22 28   BUN 11 11 10   CREATININE 1.03 1.18 1.00   GLUC 108 122 120   CALCIUM 8.1* 8.3* 8.3*   MG 1.9 2.0  --    PHOS 3.3  --   --    EGFR 80 68 83     Lab Results   Component Value Date    LEUKOCYTESUR Negative 10/22/2024

## 2024-10-31 NOTE — WOUND OSTOMY CARE
Progress Note- Ostomy  Darin Bullock 54 y.o. male  [id number]  [facility] 834-[facility] 834-01        Assessment:  Colostomy educational handouts and supply catalog provided to patient---encouraged review of the materials before next session on.  Instructed on surgery and how stoma was created, healthy characteristics of a stoma, and when to notify the physician.  Instructed on daily care of ostomy pouch--emptying when 1/3 full of gas or stool, cleaning, showering with pouch on/off, changing pouch every 3-5 days or if leaking.  Demonstrated pouch emptying/cleaning.  Demonstrated pouching system change using one piece cut-to-fit pouch--removal of pouch with push/pull method, cleansing with warm water only, measuring stoma, cutting pouch barrier to appropriate size, application of skin prep, application of pouch, holding gentle pressure on pouch after application for best adherence. Patient followed along with teaching and all questions answered.   Instructed on how to obtain ostomy supplies through visiting nurses and about outpatient ostomy clinic should patient have pouching problems after hospitalization.  Ostomy supplies at bedside.  Informed patient of  support/sample programs, patient agreeable to enroll into supply program.      Pouch Change Steps:  1. Peel back pouch using push-pull method, may use non-alcohol adhesive remover. Remove pouch from top to bottom.   2. Use warm water only to cleanse skin around the stoma (sherrie-stomal skin).   3. Make sure all adhesive residue is removed and skin is dry and not oily.  4. Measure stoma size using measuring guide and trace correct measurements onto back of pouch.  5. Then cut or mold the backing of pouch out to correct shape/size.  6. Place pouch over stoma and onto skin.  7. Use warmth of hand to apply gentle pressure to help backing of pouch to adhere well to skin.        Stoma:     Red, well budded measuring 2 inches, well connected to skin junction, peristomal  skin is intact, center os, draining bowel sweat.          Antionette Reis BSN, RN, CWOCN

## 2024-10-31 NOTE — CASE MANAGEMENT
Case Management Discharge Planning Note    Patient name Darin Bullock  Location [facility] 834/[facility] 834-01 MRN [id number]  : 1970 Date 10/31/2024       Current Admission Date: 10/23/2024  Current Admission Diagnosis:Bowel obstruction (HCC)  Patient Active Problem List    Diagnosis Date Noted Date Diagnosed    Bowel obstruction (HCC) 10/25/2024     GERD (gastroesophageal reflux disease) 10/23/2024     Constipation 10/21/2024     SIRS (systemic inflammatory response syndrome) (HCC) 10/21/2024     Elevated serum creatinine 10/21/2024     Nausea and vomiting 10/21/2024     Prolonged Q-T interval on ECG 10/21/2024       LOS (days): 8  Geometric Mean LOS (GMLOS) (days): 3.6  Days to GMLOS:-4     OBJECTIVE:  Risk of Unplanned Readmission Score: 10.2         Current admission status: Inpatient   Preferred Pharmacy:   [id number] - [place], [place] - [address] [address]  Phone: [phone] Fax: [phone]    Primary Care Provider: No primary care provider on file.    Primary Insurance: [employer]  Secondary Insurance:     DISCHARGE DETAILS:          Requested Home Health Care         Is the patient interested in HHC at discharge?: Yes  Home Health Discipline requested:: Nursing  [facility]  Home Health Follow-Up Provider:: PCP  Home Health Services Needed:: Wound/Ostomy Care  Homebound Criteria Met:: Requires the Assistance of Another Person for Safe Ambulation or to Leave the Home  Supporting Clincal Findings:: Limited Endurance          Possible d/c home tomorrow  Spoke with patient regarding HHC for ostomy care. Patient agreeable to [facility] referral  Reserved in [facility]  NO IMM Needed as pt is covered under Medicaid  Significant other to provide transport home

## 2024-10-31 NOTE — PLAN OF CARE
Problem: GASTROINTESTINAL - ADULT  Goal: Maintains or returns to baseline bowel function  Description: INTERVENTIONS:  - Assess bowel function  - Encourage oral fluids to ensure adequate hydration  - Administer IV fluids if ordered to ensure adequate hydration  - Administer ordered medications as needed  - Encourage mobilization and activity  - Consider nutritional services referral to assist patient with adequate nutrition and appropriate food choices  Outcome: Progressing  Goal: Maintains adequate nutritional intake  Description: INTERVENTIONS:  - Monitor percentage of each meal consumed  - Identify factors contributing to decreased intake, treat as appropriate  - Assist with meals as needed  - Monitor I&O, weight, and lab values if indicated  - Obtain nutrition services referral as needed  Outcome: Progressing     Problem: METABOLIC, FLUID AND ELECTROLYTES - ADULT  Goal: Electrolytes maintained within normal limits  Description: INTERVENTIONS:  - Monitor labs and assess patient for signs and symptoms of electrolyte imbalances  - Administer electrolyte replacement as ordered  - Monitor response to electrolyte replacements, including repeat lab results as appropriate  - Instruct patient on fluid and nutrition as appropriate  Outcome: Progressing     Problem: PAIN - ADULT  Goal: Verbalizes/displays adequate comfort level or baseline comfort level  Description: Interventions:  - Encourage patient to monitor pain and request assistance  - Assess pain using appropriate pain scale  - Administer analgesics based on type and severity of pain and evaluate response  - Implement non-pharmacological measures as appropriate and evaluate response  - Consider cultural and social influences on pain and pain management  - Notify physician/advanced practitioner if interventions unsuccessful or patient reports new pain  Outcome: Progressing     Problem: INFECTION - ADULT  Goal: Absence or prevention of progression during  hospitalization  Description: INTERVENTIONS:  - Assess and monitor for signs and symptoms of infection  - Monitor lab/diagnostic results  - Monitor all insertion sites, i.e. indwelling lines, tubes, and drains  - Monitor endotracheal if appropriate and nasal secretions for changes in amount and color  - [place] appropriate cooling/warming therapies per order  - Administer medications as ordered  - Instruct and encourage patient and family to use good hand hygiene technique  - Identify and instruct in appropriate isolation precautions for identified infection/condition  Outcome: Progressing     Problem: SAFETY ADULT  Goal: Patient will remain free of falls  Description: INTERVENTIONS:  - Educate patient/family on patient safety including physical limitations  - Instruct patient to call for assistance with activity   - Consult OT/PT to assist with strengthening/mobility   - Keep Call bell within reach  - Keep bed low and locked with side rails adjusted as appropriate  - Keep care items and personal belongings within reach  - Initiate and maintain comfort rounds  - Make Fall Risk Sign visible to staff  - Offer Toileting every 4 Hours, in advance of need  - Initiate/Maintain bed alarm  - Obtain necessary fall risk management equipment:   - Apply yellow socks and bracelet for high fall risk patients  - Consider moving patient to room near nurses station  Outcome: Progressing     Problem: DISCHARGE PLANNING  Goal: Discharge to home or other facility with appropriate resources  Description: INTERVENTIONS:  - Identify barriers to discharge w/patient and caregiver  - Arrange for needed discharge resources and transportation as appropriate  - Identify discharge learning needs (meds, wound care, etc.)  - Arrange for interpretive services to assist at discharge as needed  - Refer to Case Management Department for coordinating discharge planning if the patient needs post-hospital services based on physician/advanced  practitioner order or complex needs related to functional status, cognitive ability, or social support system  Outcome: Progressing     Problem: Knowledge Deficit  Goal: Patient/family/caregiver demonstrates understanding of disease process, treatment plan, medications, and discharge instructions  Description: Complete learning assessment and assess knowledge base.  Interventions:  - Provide teaching at level of understanding  - Provide teaching via preferred learning methods  Outcome: Progressing     Problem: Nutrition/Hydration-ADULT  Goal: Nutrient/Hydration intake appropriate for improving, restoring or maintaining nutritional needs  Description: Monitor and assess patient's nutrition/hydration status for malnutrition. Collaborate with interdisciplinary team and initiate plan and interventions as ordered.  Monitor patient's weight and dietary intake as ordered or per policy. Utilize nutrition screening tool and intervene as necessary. Determine patient's food preferences and provide high-protein, high-caloric foods as appropriate.     INTERVENTIONS:  - Monitor oral intake, urinary output, labs, and treatment plans  - Assess nutrition and hydration status and recommend course of action  - Evaluate amount of meals eaten  - Assist patient with eating if necessary   - Allow adequate time for meals  - Recommend/ encourage appropriate diets, oral nutritional supplements, and vitamin/mineral supplements  - Order, calculate, and assess calorie counts as needed  - Recommend, monitor, and adjust tube feedings and TPN/PPN based on assessed needs  - Assess need for intravenous fluids  - Provide specific nutrition/hydration education as appropriate  - Include patient/family/caregiver in decisions related to nutrition  Outcome: Progressing

## 2024-10-31 NOTE — ASSESSMENT & PLAN NOTE
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  Now s/p 10/29 lap hand-assisted L hemicolectomy, end colostomy.    Plan:  -advance diet to low res  -d/c IVF  -d/c PCA, PO pain meds  -DVT ppx, IS, OOB/ambulate  -ostomy teaching

## 2024-10-31 NOTE — RESTORATIVE TECHNICIAN NOTE
Restorative Technician Note      Patient Name: Darin Bullock     Restorative Tech Visit Date: 10/31/24  Note Type: Mobility  Patient Position Upon Consult: Seated edge of bed  Activity Performed: Ambulated  Assistive Device: Standard walker; Other (Comment) (trialed no AD with no difficulties)  Education Provided: Yes  Patient Position at End of Consult: All needs within reach; Other (comment) (continues ambulating independently with RW in hallway; RN aware)    Sara Sims  DPT, Restorative Technician

## 2024-11-01 PROCEDURE — 99024 POSTOP FOLLOW-UP VISIT: CPT | Performed by: SURGERY

## 2024-11-01 RX ORDER — POLYETHYLENE GLYCOL 3350 17 G/17G
17 POWDER, FOR SOLUTION ORAL DAILY
Status: DISCONTINUED | OUTPATIENT
Start: 2024-11-01 | End: 2024-11-03 | Stop reason: HOSPADM

## 2024-11-01 RX ORDER — DOCUSATE SODIUM 100 MG/1
100 CAPSULE, LIQUID FILLED ORAL 2 TIMES DAILY
Status: DISCONTINUED | OUTPATIENT
Start: 2024-11-01 | End: 2024-11-03 | Stop reason: HOSPADM

## 2024-11-01 RX ORDER — ENOXAPARIN SODIUM 100 MG/ML
40 INJECTION SUBCUTANEOUS
Status: DISCONTINUED | OUTPATIENT
Start: 2024-11-01 | End: 2024-11-03 | Stop reason: HOSPADM

## 2024-11-01 RX ADMIN — DOCUSATE SODIUM 100 MG: 100 CAPSULE, LIQUID FILLED ORAL at 09:20

## 2024-11-01 RX ADMIN — ACETAMINOPHEN 650 MG: 325 TABLET, FILM COATED ORAL at 05:43

## 2024-11-01 RX ADMIN — ACETAMINOPHEN 650 MG: 325 TABLET, FILM COATED ORAL at 11:53

## 2024-11-01 RX ADMIN — METHOCARBAMOL 500 MG: 500 TABLET ORAL at 17:11

## 2024-11-01 RX ADMIN — ENOXAPARIN SODIUM 40 MG: 40 INJECTION SUBCUTANEOUS at 08:34

## 2024-11-01 RX ADMIN — METHOCARBAMOL 500 MG: 500 TABLET ORAL at 11:53

## 2024-11-01 RX ADMIN — ACETAMINOPHEN 650 MG: 325 TABLET, FILM COATED ORAL at 17:11

## 2024-11-01 RX ADMIN — OXYCODONE HYDROCHLORIDE 10 MG: 10 TABLET ORAL at 17:11

## 2024-11-01 RX ADMIN — METHOCARBAMOL 500 MG: 500 TABLET ORAL at 05:43

## 2024-11-01 RX ADMIN — GABAPENTIN 100 MG: 100 CAPSULE ORAL at 08:04

## 2024-11-01 RX ADMIN — HEPARIN SODIUM 5000 UNITS: 5000 INJECTION INTRAVENOUS; SUBCUTANEOUS at 05:43

## 2024-11-01 RX ADMIN — DOCUSATE SODIUM 100 MG: 100 CAPSULE, LIQUID FILLED ORAL at 17:11

## 2024-11-01 RX ADMIN — Medication 3 MG: at 21:47

## 2024-11-01 RX ADMIN — GABAPENTIN 100 MG: 100 CAPSULE ORAL at 17:11

## 2024-11-01 RX ADMIN — GABAPENTIN 100 MG: 100 CAPSULE ORAL at 21:47

## 2024-11-01 RX ADMIN — POLYETHYLENE GLYCOL 3350 17 G: 17 POWDER, FOR SOLUTION ORAL at 09:20

## 2024-11-01 RX ADMIN — PANTOPRAZOLE SODIUM 40 MG: 40 TABLET, DELAYED RELEASE ORAL at 05:43

## 2024-11-01 NOTE — PLAN OF CARE
Problem: GASTROINTESTINAL - ADULT  Goal: Maintains or returns to baseline bowel function  Description: INTERVENTIONS:  - Assess bowel function  - Encourage oral fluids to ensure adequate hydration  - Administer IV fluids if ordered to ensure adequate hydration  - Administer ordered medications as needed  - Encourage mobilization and activity  - Consider nutritional services referral to assist patient with adequate nutrition and appropriate food choices  Outcome: Progressing     Problem: GASTROINTESTINAL - ADULT  Goal: Maintains adequate nutritional intake  Description: INTERVENTIONS:  - Monitor percentage of each meal consumed  - Identify factors contributing to decreased intake, treat as appropriate  - Assist with meals as needed  - Monitor I&O, weight, and lab values if indicated  - Obtain nutrition services referral as needed  Outcome: Progressing     Problem: METABOLIC, FLUID AND ELECTROLYTES - ADULT  Goal: Electrolytes maintained within normal limits  Description: INTERVENTIONS:  - Monitor labs and assess patient for signs and symptoms of electrolyte imbalances  - Administer electrolyte replacement as ordered  - Monitor response to electrolyte replacements, including repeat lab results as appropriate  - Instruct patient on fluid and nutrition as appropriate  Outcome: Progressing     Problem: PAIN - ADULT  Goal: Verbalizes/displays adequate comfort level or baseline comfort level  Description: Interventions:  - Encourage patient to monitor pain and request assistance  - Assess pain using appropriate pain scale  - Administer analgesics based on type and severity of pain and evaluate response  - Implement non-pharmacological measures as appropriate and evaluate response  - Consider cultural and social influences on pain and pain management  - Notify physician/advanced practitioner if interventions unsuccessful or patient reports new pain  Outcome: Progressing     Problem: INFECTION - ADULT  Goal: Absence or  prevention of progression during hospitalization  Description: INTERVENTIONS:  - Assess and monitor for signs and symptoms of infection  - Monitor lab/diagnostic results  - Monitor all insertion sites, i.e. indwelling lines, tubes, and drains  - Monitor endotracheal if appropriate and nasal secretions for changes in amount and color  - [place] appropriate cooling/warming therapies per order  - Administer medications as ordered  - Instruct and encourage patient and family to use good hand hygiene technique  - Identify and instruct in appropriate isolation precautions for identified infection/condition  Outcome: Progressing     Problem: SAFETY ADULT  Goal: Patient will remain free of falls  Description: INTERVENTIONS:  - Educate patient/family on patient safety including physical limitations  - Instruct patient to call for assistance with activity   - Consult OT/PT to assist with strengthening/mobility   - Keep Call bell within reach  - Keep bed low and locked with side rails adjusted as appropriate  - Keep care items and personal belongings within reach  - Initiate and maintain comfort rounds  - Make Fall Risk Sign visible to staf  Problem: DISCHARGE PLANNING  Goal: Discharge to home or other facility with appropriate resources  Description: INTERVENTIONS:  - Identify barriers to discharge w/patient and caregiver  - Arrange for needed discharge resources and transportation as appropriate  - Identify discharge learning needs (meds, wound care, etc.)  - Arrange for interpretive services to assist at discharge as needed  - Refer to Case Management Department for coordinating discharge planning if the patient needs post-hospital services based on physician/advanced practitioner order or complex needs related to functional status, cognitive ability, or social support system  Outcome: Progressing     Problem: Knowledge Deficit  Goal: Patient/family/caregiver demonstrates understanding of disease process, treatment plan,  medications, and discharge instructions  Description: Complete learning assessment and assess knowledge base.  Interventions:  - Provide teaching at level of understanding  - Provide teaching via preferred learning methods  Outcome: Progressing     Problem: Nutrition/Hydration-ADULT  Goal: Nutrient/Hydration intake appropriate for improving, restoring or maintaining nutritional needs  Description: Monitor and assess patient's nutrition/hydration status for malnutrition. Collaborate with interdisciplinary team and initiate plan and interventions as ordered.  Monitor patient's weight and dietary intake as ordered or per policy. Utilize nutrition screening tool and intervene as necessary. Determine patient's food preferences and provide high-protein, high-caloric foods as appropriate.     INTERVENTIONS:  - Monitor oral intake, urinary output, labs, and treatment plans  - Assess nutrition and hydration status and recommend course of action  - Evaluate amount of meals eaten  - Assist patient with eating if necessary   - Allow adequate time for meals  - Recommend/ encourage appropriate diets, oral nutritional supplements, and vitamin/mineral supplements  - Order, calculate, and assess calorie counts as needed  - Recommend, monitor, and adjust tube feedings and TPN/PPN based on assessed needs  - Assess need for intravenous fluids  - Provide specific nutrition/hydration education as appropriate  - Include patient/family/caregiver in decisions related to nutrition  Outcome: Progressing     Problem: SKIN/TISSUE INTEGRITY - ADULT  Goal: Incision(s), wounds(s) or drain site(s) healing without S/S of infection  Description: INTERVENTIONS  - Assess and document dressing, incision, wound bed, drain sites and surrounding tissue  - Provide patient and family education    Outcome: Progressing     - Apply yellow socks and bracelet for high fall risk patients  - Consider moving patient to room near nurses station  Outcome:  Progressing

## 2024-11-01 NOTE — DISCHARGE INSTR - OTHER ORDERS
"Ostomy Care:  1. Peel back pouch using push-pull method, may use non-alcohol adhesive remover(Purple and white package).  2. Use warm water only to cleanse skin around the stoma (sherrie-stomal skin).  3. Make sure all adhesive residue is removed and skin is dry and not oily.  4. Measure stoma size using measuring guide and trace correct measurements onto back of pouch.  5. Then cut backing of pouch out to correct shape/size.  6. If there is sherrie-stomal skin breakdown you can perform the crusting technique now to treat the skin breakdown (steps listed below)  7. Place pouch over stoma and onto skin.  8. Use warmth of hand to apply gentle pressure to help backing of pouch to adhere well to skin.    TIPS:  *Empty pouch when 1/3 -1/2 full.  *Change pouch every 3-5 days or sooner if signs of leaking.  *Measure stoma weekly for next 6-8 weeks- stoma will decrease in size due to decrease in swelling  *Can shower with pouch on or off. Make sure to dry off pouch after shower.    Crusting as needed for moist, red, open skin around the stoma: (Done prior to pouch application)  -Apply stoma powder to skin breakdown, then dust away the excess powder.   -Then use skin barrier film to pat/dab over the powder and let dry to form \"crust\"  Repeat X2 before placing new ostomy pouch     Outpatient Ostomy Clinic    If having issues or concerns pertaining to colostomy;   or if having complications with pouch leaking or sherrie-stomal skin breakdown please call:   Outpatient Wound and Ostomy Care Team @ [phone]   Option 1: [place], [place], [place], [place]  Option 2: [place] [place], [place]    Ostomy Education Resources:   www.[email or web address]   www.[email or web address]   www.[email or web address]   [email or web address]  "

## 2024-11-01 NOTE — PLAN OF CARE
Problem: GASTROINTESTINAL - ADULT  Goal: Maintains or returns to baseline bowel function  Description: INTERVENTIONS:  - Assess bowel function  - Encourage oral fluids to ensure adequate hydration  - Administer IV fluids if ordered to ensure adequate hydration  - Administer ordered medications as needed  - Encourage mobilization and activity  - Consider nutritional services referral to assist patient with adequate nutrition and appropriate food choices  Outcome: Progressing  Goal: Maintains adequate nutritional intake  Description: INTERVENTIONS:  - Monitor percentage of each meal consumed  - Identify factors contributing to decreased intake, treat as appropriate  - Assist with meals as needed  - Monitor I&O, weight, and lab values if indicated  - Obtain nutrition services referral as needed  Outcome: Progressing     Problem: METABOLIC, FLUID AND ELECTROLYTES - ADULT  Goal: Electrolytes maintained within normal limits  Description: INTERVENTIONS:  - Monitor labs and assess patient for signs and symptoms of electrolyte imbalances  - Administer electrolyte replacement as ordered  - Monitor response to electrolyte replacements, including repeat lab results as appropriate  - Instruct patient on fluid and nutrition as appropriate  Outcome: Progressing     Problem: PAIN - ADULT  Goal: Verbalizes/displays adequate comfort level or baseline comfort level  Description: Interventions:  - Encourage patient to monitor pain and request assistance  - Assess pain using appropriate pain scale  - Administer analgesics based on type and severity of pain and evaluate response  - Implement non-pharmacological measures as appropriate and evaluate response  - Consider cultural and social influences on pain and pain management  - Notify physician/advanced practitioner if interventions unsuccessful or patient reports new pain  Outcome: Progressing     Problem: INFECTION - ADULT  Goal: Absence or prevention of progression during  hospitalization  Description: INTERVENTIONS:  - Assess and monitor for signs and symptoms of infection  - Monitor lab/diagnostic results  - Monitor all insertion sites, i.e. indwelling lines, tubes, and drains  - Monitor endotracheal if appropriate and nasal secretions for changes in amount and color  - [place] appropriate cooling/warming therapies per order  - Administer medications as ordered  - Instruct and encourage patient and family to use good hand hygiene technique  - Identify and instruct in appropriate isolation precautions for identified infection/condition  Outcome: Progressing     Problem: SAFETY ADULT  Goal: Patient will remain free of falls  Description: INTERVENTIONS:  - Educate patient/family on patient safety including physical limitations  - Instruct patient to call for assistance with activity   - Consult OT/PT to assist with strengthening/mobility   - Keep Call bell within reach  - Keep bed low and locked with side rails adjusted as appropriate  - Keep care items and personal belongings within reach  - Initiate and maintain comfort rounds  - Make Fall Risk Sign visible to staff  - Offer Toileting every 2 Hours, in advance of need  -  - Obtain necessary fall risk management equipment: walker  - Apply yellow socks and bracelet for high fall risk patients  - Consider moving patient to room near nurses station  Outcome: Progressing     Problem: DISCHARGE PLANNING  Goal: Discharge to home or other facility with appropriate resources  Description: INTERVENTIONS:  - Identify barriers to discharge w/patient and caregiver  - Arrange for needed discharge resources and transportation as appropriate  - Identify discharge learning needs (meds, wound care, etc.)  - Arrange for interpretive services to assist at discharge as needed  - Refer to Case Management Department for coordinating discharge planning if the patient needs post-hospital services based on physician/advanced practitioner order or complex  needs related to functional status, cognitive ability, or social support system  Outcome: Progressing     Problem: Knowledge Deficit  Goal: Patient/family/caregiver demonstrates understanding of disease process, treatment plan, medications, and discharge instructions  Description: Complete learning assessment and assess knowledge base.  Interventions:  - Provide teaching at level of understanding  - Provide teaching via preferred learning methods  Outcome: Progressing     Problem: Nutrition/Hydration-ADULT  Goal: Nutrient/Hydration intake appropriate for improving, restoring or maintaining nutritional needs  Description: Monitor and assess patient's nutrition/hydration status for malnutrition. Collaborate with interdisciplinary team and initiate plan and interventions as ordered.  Monitor patient's weight and dietary intake as ordered or per policy. Utilize nutrition screening tool and intervene as necessary. Determine patient's food preferences and provide high-protein, high-caloric foods as appropriate.     INTERVENTIONS:  - Monitor oral intake, urinary output, labs, and treatment plans  - Assess nutrition and hydration status and recommend course of action  - Evaluate amount of meals eaten  - Assist patient with eating if necessary   - Allow adequate time for meals  - Recommend/ encourage appropriate diets, oral nutritional supplements, and vitamin/mineral supplements  - Order, calculate, and assess calorie counts as needed  - Recommend, monitor, and adjust tube feedings and TPN/PPN based on assessed needs  - Assess need for intravenous fluids  - Provide specific nutrition/hydration education as appropriate  - Include patient/family/caregiver in decisions related to nutrition  Outcome: Progressing

## 2024-11-01 NOTE — PROGRESS NOTES
Progress Note - Oncology-Surgical   Name: Darin Bullock 54 y.o. 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  Now s/p 10/29 lap hand-assisted L hemicolectomy, end colostomy.    Plan:  -continue low res diet  -multimodal pain control, antiemetics prn  -DVT ppx, IS, OOB/ambulate  -anticipate discharge within 24hr          Subjective   NAEON. Patient feels well. Pain controlled. Tolerating diet without nausea or vomiting. Ostomy with bowel sweat.    Objective :  Temp:  [98 °F (36.7 °C)-98.4 °F (36.9 °C)] 98.4 °F (36.9 °C)  HR:  [] 95  BP: (100-121)/(68-82) 100/68  Resp:  [18-20] 20  SpO2:  [94 %-96 %] 94 %  O2 Device: None (Room air)    I/O         10/30 0701  10/31 0700 10/31 0701 11/01 0700    P.O. 960 460    I.V. (mL/kg) 2824.5 (33.3) 80 (0.9)    IV Piggyback  150    Total Intake(mL/kg) 3784.5 (44.6) 690 (8.1)    Urine (mL/kg/hr) 3150 (1.5) 975 (0.5)    Total Output 3150 975    Net +634.5 -285                Lines/Drains/Airways       Active Status       Name Placement date Placement time Site Days    Colostomy LUQ 10/29/24  --  LUQ  3                  Physical Exam  General: NAD  HENT: NCAT MMM  Neck: supple, no JVD  CV: nl rate  Lungs: nl wob. No resp distress  ABD: Soft, nontender, nondistended. Incision CDI. Ostomy with scant brown liquid in bag.  Extrem: No CCE  Neuro: AAOx3      Lab Results: I have reviewed the following results:  Recent Labs     10/29/24  0442 10/30/24  0455 10/31/24  0556   WBC 7.58 9.42 6.83   HGB 14.2 12.6 12.5   HCT 42.4 37.5 38.2    281 292   SODIUM 138 135 136   K 3.9 4.9 4.0    103 104   CO2 23 22 28   BUN 11 11 10   CREATININE 1.03 1.18 1.00   GLUC 108 122 120   MG 1.9 2.0  --    PHOS 3.3  --   --              VTE Pharmacologic Prophylaxis:  VTE covered by:  heparin (porcine), Subcutaneous, 5,000 Units at 10/31/24 0625     VTE Mechanical Prophylaxis: sequential compression device

## 2024-11-01 NOTE — WOUND OSTOMY CARE
Progress Note- Ostomy  Darin Bullock 54 y.o. male  [id number]  [facility] 834-[facility] 834-01        Assessment:  Patient is POD # 3 colostomy creation. Seen for ostomy education and teaching. Introduced self and role to patient. Patient is agreeable to visit. Patient is primary learner and does not want any other family or friends present for teaching. Patient was able to independently remove pouch, cleanse sherrie-stomal skin, and cut-out the correct size for pouch barrier, and demonstrated how to close the pouch properly. I assisted patient with measuring stoma size and applying the pouch over the stoma.    Topics reviewed: normal stoma appearance, how and when to empty (1/3 full) to prevent pulling/lifting of the barrier, importance & how to clean the end of the pouch to prevent odor, gas/odor producing foods, frequency of changing of the pouch (every 3-4 days on a schedule), burping, one piece/two piece pouching systems differences, gas valve functionality of one piece, Clothing- including avoiding placing belt-line/seat belts over the stoma, bathing, and how to obtain supplies.    Step-by-step pouch change done using 1 piece [employer] pouch. Reviewed with patient how and when to measure the stoma (weekly). Demonstrated how to trace & cut one piece barrier, and how to apply it to the skin using gentle pressure / warmth of the hands. Good seal obtained.      LLQ colostomy: Stoma measures 2 inches, os is noted centrally, is round in shape, well budded, is beefy red in color, mucocutaneous junction is intact. Sherrie-stomal skin is intact with no redness or wounds. Effluent is scant amount of liquid brown stool.    Plan is for patient to have VNA at home.    Patient verbalized understanding of education and teaching. Patient is active learner. All questions and concerns answered. Encouraged patient to continue to read the educational materials provided.    Patient gave verbal permission to order sample kits from [id number],  [employer], and [employer].    Ostomy Care:  1. Peel back pouch using push-pull method, may use non-alcohol adhesive remover(Purple and white package).  2. Use warm water only to cleanse skin around the stoma (sherrie-stomal skin).  3. Make sure all adhesive residue is removed and skin is dry and not oily.  4. Measure stoma size using measuring guide and trace correct measurements onto back of pouch.  5. Then cut backing of pouch out to correct shape/size.  6. If there is sherrie-stomal skin breakdown you can perform the crusting technique now to treat the skin breakdown (steps listed below)  7. Place pouch over stoma and onto skin.  8. Use warmth of hand to apply gentle pressure to help backing of pouch to adhere well to skin.    Ostomy Plan:  1. Encourage patient to read education material at bedside.  2. Encourage patient to ambulate.  3. Encourage patient to participate with emptying/burping pouch.  4. Empty pouch when 1/3 full.      Colostomy LUQ (Active)   Stomal Appliance 1 piece;Changed 11/01/24 1420   Stoma Assessment Budded;Red 11/01/24 1420   Stoma size (in) 2 in 11/01/24 1420   Stoma Shape Round;Budded 11/01/24 1420   Peristomal Assessment Clean;Intact 11/01/24 1420   Treatment Bag change;Site care 11/01/24 1420   Output (mL) 20 mL 11/01/24 1420   Number of days: 3     Contact through [employer] Secure Chat with any questions  Wound Care will continue to follow while inpatient    Emerita LIANGN RN CWON  Wound and Ostomy care

## 2024-11-01 NOTE — RESTORATIVE TECHNICIAN NOTE
Restorative Technician Note      Patient Name: Darin Bullock     Restorative Tech Visit Date: 11/01/24  Note Type: Mobility  Patient Position Upon Consult: Supine  Education Provided: Yes  Patient Position at End of Consult: Supine; All needs within reach    Pt states they have been ambulating with family throughout the day with no concerns at this time    Sara Sims  DPT, Restorative Technician

## 2024-11-01 NOTE — ASSESSMENT & PLAN NOTE
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  Now s/p 10/29 lap hand-assisted L hemicolectomy, end colostomy.    Plan:  -continue low res diet  -multimodal pain control, antiemetics prn  -DVT ppx, IS, OOB/ambulate  -anticipate discharge within 24hr

## 2024-11-02 PROCEDURE — 99024 POSTOP FOLLOW-UP VISIT: CPT | Performed by: SURGERY

## 2024-11-02 RX ADMIN — Medication 3 MG: at 21:20

## 2024-11-02 RX ADMIN — POLYETHYLENE GLYCOL 3350 17 G: 17 POWDER, FOR SOLUTION ORAL at 07:54

## 2024-11-02 RX ADMIN — GABAPENTIN 100 MG: 100 CAPSULE ORAL at 15:05

## 2024-11-02 RX ADMIN — ACETAMINOPHEN 650 MG: 325 TABLET, FILM COATED ORAL at 18:22

## 2024-11-02 RX ADMIN — METHOCARBAMOL 500 MG: 500 TABLET ORAL at 11:22

## 2024-11-02 RX ADMIN — PANTOPRAZOLE SODIUM 40 MG: 40 TABLET, DELAYED RELEASE ORAL at 05:21

## 2024-11-02 RX ADMIN — GABAPENTIN 100 MG: 100 CAPSULE ORAL at 07:54

## 2024-11-02 RX ADMIN — ACETAMINOPHEN 650 MG: 325 TABLET, FILM COATED ORAL at 11:22

## 2024-11-02 RX ADMIN — ACETAMINOPHEN 650 MG: 325 TABLET, FILM COATED ORAL at 00:28

## 2024-11-02 RX ADMIN — DOCUSATE SODIUM 100 MG: 100 CAPSULE, LIQUID FILLED ORAL at 07:54

## 2024-11-02 RX ADMIN — ACETAMINOPHEN 650 MG: 325 TABLET, FILM COATED ORAL at 05:21

## 2024-11-02 RX ADMIN — DOCUSATE SODIUM 100 MG: 100 CAPSULE, LIQUID FILLED ORAL at 18:22

## 2024-11-02 RX ADMIN — METHOCARBAMOL 500 MG: 500 TABLET ORAL at 18:22

## 2024-11-02 RX ADMIN — OXYCODONE HYDROCHLORIDE 5 MG: 5 TABLET ORAL at 15:05

## 2024-11-02 RX ADMIN — GABAPENTIN 100 MG: 100 CAPSULE ORAL at 21:20

## 2024-11-02 RX ADMIN — METHOCARBAMOL 500 MG: 500 TABLET ORAL at 05:21

## 2024-11-02 RX ADMIN — METHOCARBAMOL 500 MG: 500 TABLET ORAL at 00:28

## 2024-11-02 RX ADMIN — ENOXAPARIN SODIUM 40 MG: 40 INJECTION SUBCUTANEOUS at 07:54

## 2024-11-02 NOTE — ASSESSMENT & PLAN NOTE
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  Now s/p 10/29 lap hand-assisted L hemicolectomy, end colostomy.    Plan:  -continue low res diet  -multimodal pain control, antiemetics prn  -DVT ppx, IS, OOB/ambulate  -continue to work with wound care nursing

## 2024-11-02 NOTE — PLAN OF CARE
Problem: GASTROINTESTINAL - ADULT  Goal: Maintains or returns to baseline bowel function  Description: INTERVENTIONS:  - Assess bowel function  - Encourage oral fluids to ensure adequate hydration  - Administer IV fluids if ordered to ensure adequate hydration  - Administer ordered medications as needed  - Encourage mobilization and activity  - Consider nutritional services referral to assist patient with adequate nutrition and appropriate food choices  Outcome: Progressing  Goal: Maintains adequate nutritional intake  Description: INTERVENTIONS:  - Monitor percentage of each meal consumed  - Identify factors contributing to decreased intake, treat as appropriate  - Assist with meals as needed  - Monitor I&O, weight, and lab values if indicated  - Obtain nutrition services referral as needed  Outcome: Progressing     Problem: METABOLIC, FLUID AND ELECTROLYTES - ADULT  Goal: Electrolytes maintained within normal limits  Description: INTERVENTIONS:  - Monitor labs and assess patient for signs and symptoms of electrolyte imbalances  - Administer electrolyte replacement as ordered  - Monitor response to electrolyte replacements, including repeat lab results as appropriate  - Instruct patient on fluid and nutrition as appropriate  Outcome: Progressing     Problem: SKIN/TISSUE INTEGRITY - ADULT  Goal: Incision(s), wounds(s) or drain site(s) healing without S/S of infection  Description: INTERVENTIONS  - Assess and document dressing, incision, wound bed, drain sites and surrounding tissue  - Provide patient and family education  - Perform skin care/dressing changes every   Outcome: Progressing     Problem: PAIN - ADULT  Goal: Verbalizes/displays adequate comfort level or baseline comfort level  Description: Interventions:  - Encourage patient to monitor pain and request assistance  - Assess pain using appropriate pain scale  - Administer analgesics based on type and severity of pain and evaluate response  - Implement  non-pharmacological measures as appropriate and evaluate response  - Consider cultural and social influences on pain and pain management  - Notify physician/advanced practitioner if interventions unsuccessful or patient reports new pain  Outcome: Progressing     Problem: INFECTION - ADULT  Goal: Absence or prevention of progression during hospitalization  Description: INTERVENTIONS:  - Assess and monitor for signs and symptoms of infection  - Monitor lab/diagnostic results  - Monitor all insertion sites, i.e. indwelling lines, tubes, and drains  - Monitor endotracheal if appropriate and nasal secretions for changes in amount and color  - [place] appropriate cooling/warming therapies per order  - Administer medications as ordered  - Instruct and encourage patient and family to use good hand hygiene technique  - Identify and instruct in appropriate isolation precautions for identified infection/condition  Outcome: Progressing     Problem: SAFETY ADULT  Goal: Patient will remain free of falls  Description: INTERVENTIONS:  - Educate patient/family on patient safety including physical limitations  - Instruct patient to call for assistance with activity   - Consult OT/PT to assist with strengthening/mobility   - Keep Call bell within reach  - Keep bed low and locked with side rails adjusted as appropriate  - Keep care items and personal belongings within reach  - Initiate and maintain comfort rounds  - Make Fall Risk Sign visible to staff  - Offer Toileting every  Hours, in advance of need  - Initiate/Maintain alarm  - Obtain necessary fall risk management equipment:   - Apply yellow socks and bracelet for high fall risk patients  - Consider moving patient to room near nurses station  Outcome: Progressing     Problem: DISCHARGE PLANNING  Goal: Discharge to home or other facility with appropriate resources  Description: INTERVENTIONS:  - Identify barriers to discharge w/patient and caregiver  - Arrange for needed  discharge resources and transportation as appropriate  - Identify discharge learning needs (meds, wound care, etc.)  - Arrange for interpretive services to assist at discharge as needed  - Refer to Case Management Department for coordinating discharge planning if the patient needs post-hospital services based on physician/advanced practitioner order or complex needs related to functional status, cognitive ability, or social support system  Outcome: Progressing     Problem: Knowledge Deficit  Goal: Patient/family/caregiver demonstrates understanding of disease process, treatment plan, medications, and discharge instructions  Description: Complete learning assessment and assess knowledge base.  Interventions:  - Provide teaching at level of understanding  - Provide teaching via preferred learning methods  Outcome: Progressing     Problem: Nutrition/Hydration-ADULT  Goal: Nutrient/Hydration intake appropriate for improving, restoring or maintaining nutritional needs  Description: Monitor and assess patient's nutrition/hydration status for malnutrition. Collaborate with interdisciplinary team and initiate plan and interventions as ordered.  Monitor patient's weight and dietary intake as ordered or per policy. Utilize nutrition screening tool and intervene as necessary. Determine patient's food preferences and provide high-protein, high-caloric foods as appropriate.     INTERVENTIONS:  - Monitor oral intake, urinary output, labs, and treatment plans  - Assess nutrition and hydration status and recommend course of action  - Evaluate amount of meals eaten  - Assist patient with eating if necessary   - Allow adequate time for meals  - Recommend/ encourage appropriate diets, oral nutritional supplements, and vitamin/mineral supplements  - Order, calculate, and assess calorie counts as needed  - Recommend, monitor, and adjust tube feedings and TPN/PPN based on assessed needs  - Assess need for intravenous fluids  - Provide  specific nutrition/hydration education as appropriate  - Include patient/family/caregiver in decisions related to nutrition  Outcome: Progressing

## 2024-11-02 NOTE — PROGRESS NOTES
Progress Note - Colorectal   Name: Darin Bullock 54 y.o. 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  Now s/p 10/29 lap hand-assisted L hemicolectomy, end colostomy.    Plan:  -continue low res diet  -multimodal pain control, antiemetics prn  -DVT ppx, IS, OOB/ambulate  -continue to work with wound care nursing          Subjective   Patient seen and examined bedside.  Pain well controlled. Tolerating diet. Emptied his ostomy appliance twice overnight. No n/v.      Objective :  Temp:  [98 °F (36.7 °C)-98.4 °F (36.9 °C)] 98.2 °F (36.8 °C)  HR:  [75-87] 75  BP: (129-144)/(89-98) 144/97  Resp:  [16-19] 19  SpO2:  [94 %-97 %] 94 %  O2 Device: None (Room air)    I/O         10/30 0701  10/31 0700 10/31 0701  11/01 0700    P.O. 960 460    I.V. (mL/kg) 2824.5 (33.3) 80 (0.9)    IV Piggyback  150    Total Intake(mL/kg) 3784.5 (44.6) 690 (8.1)    Urine (mL/kg/hr) 3150 (1.5) 975 (0.5)    Total Output 3150 975    Net +634.5 -285                Lines/Drains/Airways       Active Status       Name Placement date Placement time Site Days    Colostomy LUQ 10/29/24  --  LUQ  4                  Physical Exam  Vitals reviewed.   Constitutional:       Appearance: He is not toxic-appearing or diaphoretic.   HENT:      Head: Normocephalic and atraumatic.      Nose: Nose normal.      Mouth/Throat:      Mouth: Mucous membranes are moist.      Pharynx: Oropharynx is clear.   Eyes:      Extraocular Movements: Extraocular movements intact.      Conjunctiva/sclera: Conjunctivae normal.   Cardiovascular:      Rate and Rhythm: Normal rate.   Pulmonary:      Effort: Pulmonary effort is normal. No respiratory distress.   Abdominal:      General: There is no distension.      Palpations: Abdomen is soft.      Comments:  Ostomy viable with stool per bag   Musculoskeletal:         General: Normal range of motion.      Cervical back: Normal range of motion.   Skin:     General: Skin is warm and dry.   Neurological:      General: No focal deficit present.      Mental Status: He is alert.      Cranial Nerves: No cranial nerve deficit.             Lab Results: I have reviewed the following results:  Recent Labs     10/31/24  0556   WBC 6.83   HGB 12.5   HCT 38.2      SODIUM 136   K 4.0      CO2 28   BUN 10   CREATININE 1.00   GLUC 120             VTE Pharmacologic Prophylaxis: VTE covered by:  enoxaparin, Subcutaneous, 40 mg at 11/02/24 0754      VTE Mechanical Prophylaxis: sequential compression device

## 2024-11-02 NOTE — CASE MANAGEMENT
Case Management Discharge Planning Note    Patient name Darin Bullock  Location [facility] 834/[facility] 834-01 MRN [id number]  : 1970 Date 2024       Current Admission Date: 10/23/2024  Current Admission Diagnosis:Bowel obstruction (HCC)  Patient Active Problem List    Diagnosis Date Noted Date Diagnosed    Bowel obstruction (HCC) 10/25/2024     GERD (gastroesophageal reflux disease) 10/23/2024     Constipation 10/21/2024     SIRS (systemic inflammatory response syndrome) (HCC) 10/21/2024     Elevated serum creatinine 10/21/2024     Nausea and vomiting 10/21/2024     Prolonged Q-T interval on ECG 10/21/2024       LOS (days): 10  Geometric Mean LOS (GMLOS) (days): 4.9  Days to GMLOS:-4.8     OBJECTIVE:  Risk of Unplanned Readmission Score: 9.54         Current admission status: Inpatient   Preferred Pharmacy:   [id number] [place], [place] - [address] [address]  Phone: [phone] Fax: [phone]    Primary Care Provider: No primary care provider on file.    Primary Insurance: [employer]  Secondary Insurance:     DISCHARGE DETAILS:    Other Referral/Resources/Interventions Provided:  Referral Comments: Message sent to AQUILINO JOHNSON requesting SOC tomorrow 11/3, as team is planning on discharging tomorrow, awaiting response

## 2024-11-02 NOTE — CASE MANAGEMENT
"   Case Management Discharge Planning Note    Patient name Darin Bullock  Location [facility] 834/[facility] 834-01 MRN [id number]  : 1970 Date 2024       Current Admission Date: 10/23/2024  Current Admission Diagnosis:Bowel obstruction (HCC)  Patient Active Problem List    Diagnosis Date Noted Date Diagnosed    Bowel obstruction (HCC) 10/25/2024     GERD (gastroesophageal reflux disease) 10/23/2024     Constipation 10/21/2024     SIRS (systemic inflammatory response syndrome) (HCC) 10/21/2024     Elevated serum creatinine 10/21/2024     Nausea and vomiting 10/21/2024     Prolonged Q-T interval on ECG 10/21/2024       LOS (days): 10  Geometric Mean LOS (GMLOS) (days): 4.9  Days to GMLOS:-5     OBJECTIVE:  Risk of Unplanned Readmission Score: 9.59         Current admission status: Inpatient   Preferred Pharmacy:   [id number] [place], [place] - [address] [address]  Phone: [phone] Fax: [phone]    Primary Care Provider: No primary care provider on file.    Primary Insurance: [employer]  Secondary Insurance:     DISCHARGE DETAILS:    Other Referral/Resources/Interventions Provided:  Referral Comments: Per [facility], \"They ([facility]) do try to see next day after d/c but definitely within 48 hrs\"                                                                             "

## 2024-11-02 NOTE — ASSESSMENT & PLAN NOTE
54 y.o. male with LBO 2/2 sigmoid mass, s/p GI stent placement (10/24), s/p L hemicolectomy w/end colostomy (10/29)    Plan:  Continue wound care ostomy teaching  Diet as tolerated  OOB ambulate  PRN analgesia  DVT ppx  Q1hr IS use

## 2024-11-02 NOTE — PLAN OF CARE
Problem: GASTROINTESTINAL - ADULT  Goal: Maintains or returns to baseline bowel function  Description: INTERVENTIONS:  - Assess bowel function  - Encourage oral fluids to ensure adequate hydration  - Administer IV fluids if ordered to ensure adequate hydration  - Administer ordered medications as needed  - Encourage mobilization and activity  - Consider nutritional services referral to assist patient with adequate nutrition and appropriate food choices  Outcome: Progressing  Goal: Maintains adequate nutritional intake  Description: INTERVENTIONS:  - Monitor percentage of each meal consumed  - Identify factors contributing to decreased intake, treat as appropriate  - Assist with meals as needed  - Monitor I&O, weight, and lab values if indicated  - Obtain nutrition services referral as needed  Outcome: Progressing     Problem: METABOLIC, FLUID AND ELECTROLYTES - ADULT  Goal: Electrolytes maintained within normal limits  Description: INTERVENTIONS:  - Monitor labs and assess patient for signs and symptoms of electrolyte imbalances  - Administer electrolyte replacement as ordered  - Monitor response to electrolyte replacements, including repeat lab results as appropriate  - Instruct patient on fluid and nutrition as appropriate  Outcome: Progressing     Problem: PAIN - ADULT  Goal: Verbalizes/displays adequate comfort level or baseline comfort level  Description: Interventions:  - Encourage patient to monitor pain and request assistance  - Assess pain using appropriate pain scale  - Administer analgesics based on type and severity of pain and evaluate response  - Implement non-pharmacological measures as appropriate and evaluate response  - Consider cultural and social influences on pain and pain management  - Notify physician/advanced practitioner if interventions unsuccessful or patient reports new pain  Outcome: Progressing     Problem: INFECTION - ADULT  Goal: Absence or prevention of progression during  hospitalization  Description: INTERVENTIONS:  - Assess and monitor for signs and symptoms of infection  - Monitor lab/diagnostic results  - Monitor all insertion sites, i.e. indwelling lines, tubes, and drains  - Monitor endotracheal if appropriate and nasal secretions for changes in amount and color  - [place] appropriate cooling/warming therapies per order  - Administer medications as ordered  - Instruct and encourage patient and family to use good hand hygiene technique  - Identify and instruct in appropriate isolation precautions for identified infection/condition  Outcome: Progressing     Problem: SAFETY ADULT  Goal: Patient will remain free of falls  Description: INTERVENTIONS:  - Educate patient/family on patient safety including physical limitations  - Instruct patient to call for assistance with activity   - Consult OT/PT to assist with strengthening/mobility   - Keep Call bell within reach  - Keep bed low and locked with side rails adjusted as appropriate  - Keep care items and personal belongings within reach  - Initiate and maintain comfort rounds  - Make Fall Risk Sign visible to staff  - Apply yellow socks and bracelet for high fall risk patients  - Consider moving patient to room near nurses station  Outcome: Progressing     Problem: DISCHARGE PLANNING  Goal: Discharge to home or other facility with appropriate resources  Description: INTERVENTIONS:  - Identify barriers to discharge w/patient and caregiver  - Arrange for needed discharge resources and transportation as appropriate  - Identify discharge learning needs (meds, wound care, etc.)  - Arrange for interpretive services to assist at discharge as needed  - Refer to Case Management Department for coordinating discharge planning if the patient needs post-hospital services based on physician/advanced practitioner order or complex needs related to functional status, cognitive ability, or social support system  Outcome: Progressing     Problem:  Knowledge Deficit  Goal: Patient/family/caregiver demonstrates understanding of disease process, treatment plan, medications, and discharge instructions  Description: Complete learning assessment and assess knowledge base.  Interventions:  - Provide teaching at level of understanding  - Provide teaching via preferred learning methods  Outcome: Progressing     Problem: Nutrition/Hydration-ADULT  Goal: Nutrient/Hydration intake appropriate for improving, restoring or maintaining nutritional needs  Description: Monitor and assess patient's nutrition/hydration status for malnutrition. Collaborate with interdisciplinary team and initiate plan and interventions as ordered.  Monitor patient's weight and dietary intake as ordered or per policy. Utilize nutrition screening tool and intervene as necessary. Determine patient's food preferences and provide high-protein, high-caloric foods as appropriate.     INTERVENTIONS:  - Monitor oral intake, urinary output, labs, and treatment plans  - Assess nutrition and hydration status and recommend course of action  - Evaluate amount of meals eaten  - Assist patient with eating if necessary   - Allow adequate time for meals  - Recommend/ encourage appropriate diets, oral nutritional supplements, and vitamin/mineral supplements  - Order, calculate, and assess calorie counts as needed  - Recommend, monitor, and adjust tube feedings and TPN/PPN based on assessed needs  - Assess need for intravenous fluids  - Provide specific nutrition/hydration education as appropriate  - Include patient/family/caregiver in decisions related to nutrition  Outcome: Progressing     Problem: SKIN/TISSUE INTEGRITY - ADULT  Goal: Incision(s), wounds(s) or drain site(s) healing without S/S of infection  Description: INTERVENTIONS  - Assess and document dressing, incision, wound bed, drain sites and surrounding tissue  - Provide patient and family education    Outcome: Progressing

## 2024-11-02 NOTE — PROGRESS NOTES
"Into do lovenox teaching with the patient; pt does not want teaching now; states \"I do not want to do it now\"; requesting for more in depth teaching later today; this nurse did briefly review teaching with patient; pt states \"I gave my dog injections so I should be fine\"; will continue care  "

## 2024-11-03 VITALS
WEIGHT: 187.17 LBS | HEART RATE: 83 BPM | RESPIRATION RATE: 16 BRPM | BODY MASS INDEX: 26.8 KG/M2 | OXYGEN SATURATION: 96 % | DIASTOLIC BLOOD PRESSURE: 84 MMHG | SYSTOLIC BLOOD PRESSURE: 124 MMHG | HEIGHT: 70 IN | TEMPERATURE: 97.9 F

## 2024-11-03 PROCEDURE — NC001 PR NO CHARGE: Performed by: SURGERY

## 2024-11-03 PROCEDURE — 99024 POSTOP FOLLOW-UP VISIT: CPT | Performed by: SURGERY

## 2024-11-03 RX ORDER — METHOCARBAMOL 500 MG/1
500 TABLET, FILM COATED ORAL EVERY 6 HOURS SCHEDULED
Qty: 56 TABLET | Refills: 0 | Status: SHIPPED | OUTPATIENT
Start: 2024-11-03 | End: 2024-11-17

## 2024-11-03 RX ORDER — ENOXAPARIN SODIUM 100 MG/ML
40 INJECTION SUBCUTANEOUS
Qty: 9.2 ML | Refills: 0 | Status: SHIPPED | OUTPATIENT
Start: 2024-11-04 | End: 2024-11-27

## 2024-11-03 RX ORDER — GABAPENTIN 100 MG/1
100 CAPSULE ORAL 3 TIMES DAILY
Qty: 42 CAPSULE | Refills: 0 | Status: SHIPPED | OUTPATIENT
Start: 2024-11-03 | End: 2024-11-17

## 2024-11-03 RX ORDER — OXYCODONE HYDROCHLORIDE 5 MG/1
5 TABLET ORAL EVERY 6 HOURS PRN
Qty: 10 TABLET | Refills: 0 | Status: SHIPPED | OUTPATIENT
Start: 2024-11-03 | End: 2024-11-06

## 2024-11-03 RX ADMIN — DOCUSATE SODIUM 100 MG: 100 CAPSULE, LIQUID FILLED ORAL at 08:00

## 2024-11-03 RX ADMIN — ACETAMINOPHEN 650 MG: 325 TABLET, FILM COATED ORAL at 05:20

## 2024-11-03 RX ADMIN — GABAPENTIN 100 MG: 100 CAPSULE ORAL at 08:00

## 2024-11-03 RX ADMIN — METHOCARBAMOL 500 MG: 500 TABLET ORAL at 11:30

## 2024-11-03 RX ADMIN — PANTOPRAZOLE SODIUM 40 MG: 40 TABLET, DELAYED RELEASE ORAL at 05:20

## 2024-11-03 RX ADMIN — POLYETHYLENE GLYCOL 3350 17 G: 17 POWDER, FOR SOLUTION ORAL at 08:00

## 2024-11-03 RX ADMIN — ACETAMINOPHEN 650 MG: 325 TABLET, FILM COATED ORAL at 11:30

## 2024-11-03 RX ADMIN — ENOXAPARIN SODIUM 40 MG: 40 INJECTION SUBCUTANEOUS at 08:00

## 2024-11-03 RX ADMIN — METHOCARBAMOL 500 MG: 500 TABLET ORAL at 05:20

## 2024-11-03 NOTE — PLAN OF CARE
Problem: GASTROINTESTINAL - ADULT  Goal: Maintains or returns to baseline bowel function  Description: INTERVENTIONS:  - Assess bowel function  - Encourage oral fluids to ensure adequate hydration  - Administer IV fluids if ordered to ensure adequate hydration  - Administer ordered medications as needed  - Encourage mobilization and activity  - Consider nutritional services referral to assist patient with adequate nutrition and appropriate food choices  Outcome: Progressing     Problem: GASTROINTESTINAL - ADULT  Goal: Maintains adequate nutritional intake  Description: INTERVENTIONS:  - Monitor percentage of each meal consumed  - Identify factors contributing to decreased intake, treat as appropriate  - Assist with meals as needed  - Monitor I&O, weight, and lab values if indicated  - Obtain nutrition services referral as needed  Outcome: Progressing     Problem: METABOLIC, FLUID AND ELECTROLYTES - ADULT  Goal: Electrolytes maintained within normal limits  Description: INTERVENTIONS:  - Monitor labs and assess patient for signs and symptoms of electrolyte imbalances  - Administer electrolyte replacement as ordered  - Monitor response to electrolyte replacements, including repeat lab results as appropriate  - Instruct patient on fluid and nutrition as appropriate  Outcome: Progressing     Problem: PAIN - ADULT  Goal: Verbalizes/displays adequate comfort level or baseline comfort level  Description: Interventions:  - Encourage patient to monitor pain and request assistance  - Assess pain using appropriate pain scale  - Administer analgesics based on type and severity of pain and evaluate response  - Implement non-pharmacological measures as appropriate and evaluate response  - Consider cultural and social influences on pain and pain management  - Notify physician/advanced practitioner if interventions unsuccessful or patient reports new pain  Outcome: Progressing     Problem: INFECTION - ADULT  Goal: Absence or  prevention of progression during hospitalization  Description: INTERVENTIONS:  - Assess and monitor for signs and symptoms of infection  - Monitor lab/diagnostic results  - Monitor all insertion sites, i.e. indwelling lines, tubes, and drains  - Monitor endotracheal if appropriate and nasal secretions for changes in amount and color  - [place] appropriate cooling/warming therapies per order  - Administer medications as ordered  - Instruct and encourage patient and family to use good hand hygiene technique  - Identify and instruct in appropriate isolation precautions for identified infection/condition  Outcome: Progressing     Problem: SAFETY ADULT  Goal: Patient will remain free of falls  Description: INTERVENTIONS:  - Educate patient/family on patient safety including physical limitations  - Instruct patient to call for assistance with activity   - Consult OT/PT to assist with strengthening/mobility   - Keep Call bell within reach  - Keep bed low and locked with side rails adjusted as appropriate  - Keep care items and personal belongings within reach  - Initiate and maintain comfort rounds  - Make Fall Risk Sign visible to staff  Problem: Knowledge Deficit  Goal: Patient/family/caregiver demonstrates understanding of disease process, treatment plan, medications, and discharge instructions  Description: Complete learning assessment and assess knowledge base.  Interventions:  - Provide teaching at level of understanding  - Provide teaching via preferred learning methods  Outcome: Progressing     Problem: Nutrition/Hydration-ADULT  Goal: Nutrient/Hydration intake appropriate for improving, restoring or maintaining nutritional needs  Description: Monitor and assess patient's nutrition/hydration status for malnutrition. Collaborate with interdisciplinary team and initiate plan and interventions as ordered.  Monitor patient's weight and dietary intake as ordered or per policy. Utilize nutrition screening tool and  intervene as necessary. Determine patient's food preferences and provide high-protein, high-caloric foods as appropriate.     INTERVENTIONS:  - Monitor oral intake, urinary output, labs, and treatment plans  - Assess nutrition and hydration status and recommend course of action  - Evaluate amount of meals eaten  - Assist patient with eating if necessary   - Allow adequate time for meals  - Recommend/ encourage appropriate diets, oral nutritional supplements, and vitamin/mineral supplements  - Order, calculate, and assess calorie counts as needed  - Recommend, monitor, and adjust tube feedings and TPN/PPN based on assessed needs  - Assess need for intravenous fluids  - Provide specific nutrition/hydration education as appropriate  - Include patient/family/caregiver in decisions related to nutrition  Outcome: Progressing     Problem: SKIN/TISSUE INTEGRITY - ADULT  Goal: Incision(s), wounds(s) or drain site(s) healing without S/S of infection  Description: INTERVENTIONS  - Assess and document dressing, incision, wound bed, drain sites and surrounding tissue  - Provide patient and family education  - Perform skin care/dressing changes every   Outcome: Progressing     - Apply yellow socks and bracelet for high fall risk patients  - Consider moving patient to room near nurses station  Outcome: Progressing     Problem: DISCHARGE PLANNING  Goal: Discharge to home or other facility with appropriate resources  Description: INTERVENTIONS:  - Identify barriers to discharge w/patient and caregiver  - Arrange for needed discharge resources and transportation as appropriate  - Identify discharge learning needs (meds, wound care, etc.)  - Arrange for interpretive services to assist at discharge as needed  - Refer to Case Management Department for coordinating discharge planning if the patient needs post-hospital services based on physician/advanced practitioner order or complex needs related to functional status, cognitive ability,  or social support system  Outcome: Progressing

## 2024-11-03 NOTE — PLAN OF CARE
Problem: GASTROINTESTINAL - ADULT  Goal: Maintains or returns to baseline bowel function  Description: INTERVENTIONS:  - Assess bowel function  - Encourage oral fluids to ensure adequate hydration  - Administer IV fluids if ordered to ensure adequate hydration  - Administer ordered medications as needed  - Encourage mobilization and activity  - Consider nutritional services referral to assist patient with adequate nutrition and appropriate food choices  Outcome: Progressing  Goal: Maintains adequate nutritional intake  Description: INTERVENTIONS:  - Monitor percentage of each meal consumed  - Identify factors contributing to decreased intake, treat as appropriate  - Assist with meals as needed  - Monitor I&O, weight, and lab values if indicated  - Obtain nutrition services referral as needed  Outcome: Progressing     Problem: METABOLIC, FLUID AND ELECTROLYTES - ADULT  Goal: Electrolytes maintained within normal limits  Description: INTERVENTIONS:  - Monitor labs and assess patient for signs and symptoms of electrolyte imbalances  - Administer electrolyte replacement as ordered  - Monitor response to electrolyte replacements, including repeat lab results as appropriate  - Instruct patient on fluid and nutrition as appropriate  Outcome: Progressing     Problem: SKIN/TISSUE INTEGRITY - ADULT  Goal: Incision(s), wounds(s) or drain site(s) healing without S/S of infection  Description: INTERVENTIONS  - Assess and document dressing, incision, wound bed, drain sites and surrounding tissue  - Provide patient and family education  - Perform skin care/dressing changes every   Outcome: Progressing     Problem: PAIN - ADULT  Goal: Verbalizes/displays adequate comfort level or baseline comfort level  Description: Interventions:  - Encourage patient to monitor pain and request assistance  - Assess pain using appropriate pain scale  - Administer analgesics based on type and severity of pain and evaluate response  - Implement  non-pharmacological measures as appropriate and evaluate response  - Consider cultural and social influences on pain and pain management  - Notify physician/advanced practitioner if interventions unsuccessful or patient reports new pain  Outcome: Progressing     Problem: INFECTION - ADULT  Goal: Absence or prevention of progression during hospitalization  Description: INTERVENTIONS:  - Assess and monitor for signs and symptoms of infection  - Monitor lab/diagnostic results  - Monitor all insertion sites, i.e. indwelling lines, tubes, and drains  - Monitor endotracheal if appropriate and nasal secretions for changes in amount and color  - [place] appropriate cooling/warming therapies per order  - Administer medications as ordered  - Instruct and encourage patient and family to use good hand hygiene technique  - Identify and instruct in appropriate isolation precautions for identified infection/condition  Outcome: Progressing     Problem: SAFETY ADULT  Goal: Patient will remain free of falls  Description: INTERVENTIONS:  - Educate patient/family on patient safety including physical limitations  - Instruct patient to call for assistance with activity   - Consult OT/PT to assist with strengthening/mobility   - Keep Call bell within reach  - Keep bed low and locked with side rails adjusted as appropriate  - Keep care items and personal belongings within reach  - Initiate and maintain comfort rounds  - Make Fall Risk Sign visible to staff  - Offer Toileting every  Hours, in advance of need  - Initiate/Maintain alarm  - Obtain necessary fall risk management equipment:   - Apply yellow socks and bracelet for high fall risk patients  - Consider moving patient to room near nurses station  Outcome: Progressing     Problem: DISCHARGE PLANNING  Goal: Discharge to home or other facility with appropriate resources  Description: INTERVENTIONS:  - Identify barriers to discharge w/patient and caregiver  - Arrange for needed  discharge resources and transportation as appropriate  - Identify discharge learning needs (meds, wound care, etc.)  - Arrange for interpretive services to assist at discharge as needed  - Refer to Case Management Department for coordinating discharge planning if the patient needs post-hospital services based on physician/advanced practitioner order or complex needs related to functional status, cognitive ability, or social support system  Outcome: Progressing     Problem: Knowledge Deficit  Goal: Patient/family/caregiver demonstrates understanding of disease process, treatment plan, medications, and discharge instructions  Description: Complete learning assessment and assess knowledge base.  Interventions:  - Provide teaching at level of understanding  - Provide teaching via preferred learning methods  Outcome: Progressing     Problem: Nutrition/Hydration-ADULT  Goal: Nutrient/Hydration intake appropriate for improving, restoring or maintaining nutritional needs  Description: Monitor and assess patient's nutrition/hydration status for malnutrition. Collaborate with interdisciplinary team and initiate plan and interventions as ordered.  Monitor patient's weight and dietary intake as ordered or per policy. Utilize nutrition screening tool and intervene as necessary. Determine patient's food preferences and provide high-protein, high-caloric foods as appropriate.     INTERVENTIONS:  - Monitor oral intake, urinary output, labs, and treatment plans  - Assess nutrition and hydration status and recommend course of action  - Evaluate amount of meals eaten  - Assist patient with eating if necessary   - Allow adequate time for meals  - Recommend/ encourage appropriate diets, oral nutritional supplements, and vitamin/mineral supplements  - Order, calculate, and assess calorie counts as needed  - Recommend, monitor, and adjust tube feedings and TPN/PPN based on assessed needs  - Assess need for intravenous fluids  - Provide  specific nutrition/hydration education as appropriate  - Include patient/family/caregiver in decisions related to nutrition  Outcome: Progressing

## 2024-11-03 NOTE — PROGRESS NOTES
Progress Note - Colorectal   Name: Darin Bullock 54 y.o. 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  Now s/p 10/29 lap hand-assisted L hemicolectomy, end colostomy.    AVSS on room air  Urine output 2325 cc  Stool 400 cc of solid output from ostomy    No a.m. labs pending    Plan  Low residue diet  DVT prophylaxis  Lovenox teaching  PT OT no needs  Follow-up case management for [facility]  Possible discharge home today        Subjective   No acute events overnight.  Patient denies having abdominal pain.  Denies having nausea, vomiting, fevers, chills, chest pain, shortness of breath.  Tolerating oral diet.  Voiding without difficulty.  Adequate ostomy output.    Objective :  Temp:  [97.5 °F (36.4 °C)-97.9 °F (36.6 °C)] 97.9 °F (36.6 °C)  HR:  [] 83  BP: (124-151)/(84-99) 124/84  Resp:  [16-18] 16  SpO2:  [95 %-96 %] 96 %  O2 Device: None (Room air)    I/O         11/01 0701  11/02 0700 11/02 0701  11/03 0700 11/03 0701  11/04 0700    P.O. 240 478     I.V. (mL/kg)       IV Piggyback       Total Intake(mL/kg) 240 (2.8) 478 (5.6)     Urine (mL/kg/hr) 1525 (0.7) 2325 (1.1)     Stool 420 400     Total Output 1945 5255     Net -1705 -2247                  Lines/Drains/Airways       Active Status       Name Placement date Placement time Site Days    Colostomy LUQ 10/29/24  --  LUQ  5                  Physical Exam      Physical Exam:  General: No acute distress, alert and oriented  Neuro: alert, oriented x3  HENT: PERRL, EOMI  CV: Well perfused, regular rate and rhythm  Lungs: Normal work of breathing, no increased respiratory effort  Abdomen: Soft, nontender, nondistended.  Ostomy with solid stool output.  MSK/Extremities: No edema, clubbing or cyanosis  Skin: Warm, dry      Lab  "Results: I have reviewed the following results:  No results for input(s): \"WBC\", \"HGB\", \"HCT\", \"PLT\", \"BANDSPCT\", \"SODIUM\", \"K\", \"CL\", \"CO2\", \"BUN\", \"CREATININE\", \"GLUC\", \"CAIONIZED\", \"MG\", \"PHOS\", \"AST\", \"ALT\", \"ALB\", \"TBILI\", \"DBILI\", \"ALKPHOS\", \"PTT\", \"INR\", \"HSTNI0\", \"HSTNI2\", \"BNP\", \"LACTICACID\" in the last 72 hours.      VTE Pharmacologic Prophylaxis: Enoxaparin (Lovenox)  VTE Mechanical Prophylaxis: sequential compression device  "

## 2024-11-03 NOTE — DISCHARGE SUMMARY
"Discharge Summary - Surgery-General   Name: Darin Bullock 54 y.o. male I MRN: [id number]  Unit/Bed#: [facility] 834-01 I Date of Admission: 10/23/2024   Date of Service: 11/3/2024 I Hospital Day: 11    Admission Date: 10/23/2024 1836  Discharge Date: 11/03/24  Admitting Diagnosis: Constipation [K59.00]  Discharge Diagnosis:   Medical Problems       Resolved Problems  Date Reviewed: 10/21/2024   None         HPI:   Per Dr. Pearson:  \"Darin Bullock is a 54 y.o. male with no significant PMHx or PSHx presenting due to 1 week of constipation & increasing abdominal distention. 10/[facility] [place] [facility] he got GoLytely for constipation & discharged. Had a large bout of emesis a few hours later. Decreased appetite over last couple days. No previous significant family history or colonoscopy. CTAP showed large volume stool and gas throughout colon, decompressed sigmoid, with partially obstructing apple core lesion in descending/sigmoid colon junction.  Has had several small emeses the last few days, but no current n/v. No fevers. Last had jello earlier today. Had a small BM in the morning, passed flatus overnight\"      Procedures Performed: No orders of the defined types were placed in this encounter.      Summary of Hospital Course:    Patient presented with obstructing colon mass. GI was consulted and he underwent colonic stent placement on 10/24. He was taken to the OR on 10/29 for laparoscopic Left hemicolectomy with descending colostomy placement. He progressed well post-operatively. His diet was slowly advanced, of which he was tolerating. His pain was well controlled. He was out of bed and ambulating. He worked with PT / OT and recommended no post-acute rehab needs.    He was seen by wound care for ostomy teaching. [facility] was set up for home assistance and will see him within 48 hours upon discharge. He was given lovenox teaching prior to discharge. He will continue lovenox for a post-op course of 28 days total (23 additional days) " for DVT ppx. He will follow up as scheduled outpatient.    Significant Findings, Care, Treatment and Services Provided: As noted, see daily progress notes for details.     Complications: As noted, see daily progress notes for details.     Condition at Discharge: stable       Discharge instructions/Information to patient and family:   See After Visit Summary (AVS) for information provided to patient and family.      Provisions for Follow-Up Care:  See after visit summary for information related to follow-up care and any pertinent home health orders.      PCP: No primary care provider on file.    Disposition: Home    Planned Readmission: No     Discharge Medications:  See after visit summary for reconciled discharge medications provided to patient and family.      Discharge Statement:  I have spent a total time of 25 minutes in caring for this patient on the day of the visit/encounter.

## 2024-11-03 NOTE — CASE MANAGEMENT
Case Management Discharge Planning Note    Patient name Darin Bullock  Location [facility] 834/[facility] 834-01 MRN [id number]  : 1970 Date 11/3/2024       Current Admission Date: 10/23/2024  Current Admission Diagnosis:Bowel obstruction (HCC)   Patient Active Problem List    Diagnosis Date Noted Date Diagnosed    Bowel obstruction (HCC) 10/25/2024     GERD (gastroesophageal reflux disease) 10/23/2024     Constipation 10/21/2024     SIRS (systemic inflammatory response syndrome) (HCC) 10/21/2024     Elevated serum creatinine 10/21/2024     Nausea and vomiting 10/21/2024     Prolonged Q-T interval on ECG 10/21/2024       LOS (days): 11  Geometric Mean LOS (GMLOS) (days): 4.9  Days to GMLOS:-5.8     OBJECTIVE:  Risk of Unplanned Readmission Score: 8.33         Current admission status: Inpatient   Preferred Pharmacy:   [id number] [place], [place] - [address] [address]  Phone: [phone] Fax: [phone]    Primary Care Provider: No primary care provider on file.    Primary Insurance: [employer]  Secondary Insurance:     DISCHARGE DETAILS:    Other Referral/Resources/Interventions Provided:  Referral Comments: This CM informed medical team that [facility] will see patient within 48 hours of DC.  This CM requested that staff RN provide ostomy supplies to patient on DC.  Message sent to  [facility] via [facility]APP regarding planned DC today

## 2024-11-03 NOTE — PLAN OF CARE
Problem: GASTROINTESTINAL - ADULT  Goal: Maintains or returns to baseline bowel function  Description: INTERVENTIONS:  - Assess bowel function  - Encourage oral fluids to ensure adequate hydration  - Administer IV fluids if ordered to ensure adequate hydration  - Administer ordered medications as needed  - Encourage mobilization and activity  - Consider nutritional services referral to assist patient with adequate nutrition and appropriate food choices  11/3/2024 1500 by Mitali Luke RN  Outcome: Completed  11/3/2024 0713 by Mitali Luke RN  Outcome: Progressing  Goal: Maintains adequate nutritional intake  Description: INTERVENTIONS:  - Monitor percentage of each meal consumed  - Identify factors contributing to decreased intake, treat as appropriate  - Assist with meals as needed  - Monitor I&O, weight, and lab values if indicated  - Obtain nutrition services referral as needed  11/3/2024 1500 by Mitali Luke RN  Outcome: Completed  11/3/2024 0713 by Mitali Luke RN  Outcome: Progressing     Problem: METABOLIC, FLUID AND ELECTROLYTES - ADULT  Goal: Electrolytes maintained within normal limits  Description: INTERVENTIONS:  - Monitor labs and assess patient for signs and symptoms of electrolyte imbalances  - Administer electrolyte replacement as ordered  - Monitor response to electrolyte replacements, including repeat lab results as appropriate  - Instruct patient on fluid and nutrition as appropriate  11/3/2024 1500 by Mitali Luke RN  Outcome: Completed  11/3/2024 0713 by Mitali Luke RN  Outcome: Progressing     Problem: PAIN - ADULT  Goal: Verbalizes/displays adequate comfort level or baseline comfort level  Description: Interventions:  - Encourage patient to monitor pain and request assistance  - Assess pain using appropriate pain scale  - Administer analgesics based on type and severity of pain and evaluate response  - Implement non-pharmacological measures as appropriate  and evaluate response  - Consider cultural and social influences on pain and pain management  - Notify physician/advanced practitioner if interventions unsuccessful or patient reports new pain  11/3/2024 1500 by Mitali Luke RN  Outcome: Completed  11/3/2024 0713 by Mitali Luke RN  Outcome: Progressing     Problem: INFECTION - ADULT  Goal: Absence or prevention of progression during hospitalization  Description: INTERVENTIONS:  - Assess and monitor for signs and symptoms of infection  - Monitor lab/diagnostic results  - Monitor all insertion sites, i.e. indwelling lines, tubes, and drains  - Monitor endotracheal if appropriate and nasal secretions for changes in amount and color  - Prospect appropriate cooling/warming therapies per order  - Administer medications as ordered  - Instruct and encourage patient and family to use good hand hygiene technique  - Identify and instruct in appropriate isolation precautions for identified infection/condition  11/3/2024 1500 by Mitali Luke RN  Outcome: Completed  11/3/2024 0713 by Mitali Luke RN  Outcome: Progressing     Problem: SAFETY ADULT  Goal: Patient will remain free of falls  Description: INTERVENTIONS:  - Educate patient/family on patient safety including physical limitations  - Instruct patient to call for assistance with activity   - Consult OT/PT to assist with strengthening/mobility   - Keep Call bell within reach  - Keep bed low and locked with side rails adjusted as appropriate  - Keep care items and personal belongings within reach  - Initiate and maintain comfort rounds  - Make Fall Risk Sign visible to staff  - Apply yellow socks and bracelet for high fall risk patients  - Consider moving patient to room near nurses station  11/3/2024 1500 by Mitali Luke RN  Outcome: Completed  11/3/2024 0713 by Mitali Luke RN  Outcome: Progressing     Problem: DISCHARGE PLANNING  Goal: Discharge to home or other facility with  appropriate resources  Description: INTERVENTIONS:  - Identify barriers to discharge w/patient and caregiver  - Arrange for needed discharge resources and transportation as appropriate  - Identify discharge learning needs (meds, wound care, etc.)  - Arrange for interpretive services to assist at discharge as needed  - Refer to Case Management Department for coordinating discharge planning if the patient needs post-hospital services based on physician/advanced practitioner order or complex needs related to functional status, cognitive ability, or social support system  11/3/2024 1500 by Mitali Luke RN  Outcome: Completed  11/3/2024 0713 by Mitali Luke RN  Outcome: Progressing     Problem: Knowledge Deficit  Goal: Patient/family/caregiver demonstrates understanding of disease process, treatment plan, medications, and discharge instructions  Description: Complete learning assessment and assess knowledge base.  Interventions:  - Provide teaching at level of understanding  - Provide teaching via preferred learning methods  11/3/2024 1500 by Mitali Luke RN  Outcome: Completed  11/3/2024 0713 by Mitali Luke RN  Outcome: Progressing     Problem: Nutrition/Hydration-ADULT  Goal: Nutrient/Hydration intake appropriate for improving, restoring or maintaining nutritional needs  Description: Monitor and assess patient's nutrition/hydration status for malnutrition. Collaborate with interdisciplinary team and initiate plan and interventions as ordered.  Monitor patient's weight and dietary intake as ordered or per policy. Utilize nutrition screening tool and intervene as necessary. Determine patient's food preferences and provide high-protein, high-caloric foods as appropriate.     INTERVENTIONS:  - Monitor oral intake, urinary output, labs, and treatment plans  - Assess nutrition and hydration status and recommend course of action  - Evaluate amount of meals eaten  - Assist patient with eating if  necessary   - Allow adequate time for meals  - Recommend/ encourage appropriate diets, oral nutritional supplements, and vitamin/mineral supplements  - Order, calculate, and assess calorie counts as needed  - Recommend, monitor, and adjust tube feedings and TPN/PPN based on assessed needs  - Assess need for intravenous fluids  - Provide specific nutrition/hydration education as appropriate  - Include patient/family/caregiver in decisions related to nutrition  11/3/2024 1500 by Mitali Luke RN  Outcome: Completed  11/3/2024 0713 by Mitali Luke RN  Outcome: Progressing     Problem: SKIN/TISSUE INTEGRITY - ADULT  Goal: Incision(s), wounds(s) or drain site(s) healing without S/S of infection  Description: INTERVENTIONS  - Assess and document dressing, incision, wound bed, drain sites and surrounding tissue  - Provide patient and family education  - Perform skin care/dressing changes every 11/3/2024 1500 by Mitali Luke RN  Outcome: Completed  11/3/2024 0713 by Mitali Luke RN  Outcome: Progressing

## 2024-11-03 NOTE — DISCHARGE INSTR - AVS FIRST PAGE
DISCHARGE INSTRUCTIONS          LOVENOX GUIDELINES  A prescription has been sent to your pharmacy for lovenox injections. You must do these every day as instructed in the hospital. These are important in preventing blood clots after surgery. Call the office for any questions         Please follow up as instructed    Activity:    Do not lift more than 10 pounds (a gallon of milk) for 1-2 weeks post-operatively                 Walking is encouraged  Normal daily activities including climbing steps are okay  Do not engage in strenuous activity or contact sports for 4-6 weeks post-operatively    Return to work:   Return to work to be discussed at first post-operative visit    Diet:   You may return to your normal heart healthy diet    Wound Care:  It is okay to shower. Wash incision gently with soap and water and pat dry. Do not soak incisions in bath water or swim for two weeks. Do not apply any creams or ointments.    Pain Medication:   Please take as directed  No driving while taking narcotic pain medications    Other:  If you have questions after discharge please call the office.    If you have increased pain, fever >101.5, increased drainage, redness or a bad smell at your surgery site, please call us immediately or come directly to the Emergency Room

## 2024-11-03 NOTE — ASSESSMENT & PLAN NOTE
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  Now s/p 10/29 lap hand-assisted L hemicolectomy, end colostomy.    AVSS on room air  Urine output 2325 cc  Stool 400 cc of solid output from ostomy    No a.m. labs pending    Plan  Low residue diet  DVT prophylaxis  Lovenox teaching  PT OT no needs  Follow-up case management for [facility]  Possible discharge home today

## 2024-11-04 ENCOUNTER — HOME CARE VISIT (OUTPATIENT)
Dept: HOME HEALTH SERVICES | Facility: HOME HEALTHCARE | Age: 54
End: 2024-11-04

## 2024-11-04 PROCEDURE — 88342 IMHCHEM/IMCYTCHM 1ST ANTB: CPT | Performed by: PATHOLOGY

## 2024-11-04 PROCEDURE — 88309 TISSUE EXAM BY PATHOLOGIST: CPT | Performed by: PATHOLOGY

## 2024-11-04 PROCEDURE — 88341 IMHCHEM/IMCYTCHM EA ADD ANTB: CPT | Performed by: PATHOLOGY

## 2024-11-04 NOTE — UTILIZATION REVIEW
NOTIFICATION OF ADMISSION DISCHARGE   This is a Notification of Discharge from [facility]. Please be advised that this patient has been discharge from our facility. Below you will find the admission and discharge date and time including the patient’s disposition.   UTILIZATION REVIEW CONTACT:  Livan Willis  Utilization   Network Utilization Review Department  Phone: [phone] x carefully listen to the prompts. All voicemails are confidential.  Email: [email or web address]     ADMISSION INFORMATION  PRESENTATION DATE: 10/23/2024  6:36 PM  OBERVATION ADMISSION DATE: N/A  INPATIENT ADMISSION DATE: 10/23/24  6:36 PM   DISCHARGE DATE: 11/3/2024  3:01 PM   DISPOSITION:Home with Home Health Care    Network Utilization Review Department  ATTENTION: Please call with any questions or concerns to [phone] and carefully listen to the prompts so that you are directed to the right person. All voicemails are confidential.   For Discharge needs, contact Care Management DC Support Team at [phone] opt. [phone]  Send all requests for admission clinical reviews, approved or denied determinations and any other requests to dedicated fax number below belonging to the campus where the patient is receiving treatment. List of dedicated fax numbers for the Facilities:  FACILITY NAME UR FAX NUMBER   ADMISSION DENIALS (Administrative/Medical Necessity) [phone]   DISCHARGE SUPPORT TEAM ([facility]) [phone]   PARENT CHILD HEALTH (Maternity/NICU/Pediatrics) [phone]   [facility] [phone]   [facility] [phone]   [facility] [phone]   [facility] [phone]   [facility] [phone]   [facility] [phone]   [facility] [phone]   [facility]  [phone]   [facility] [phone]   [facility] [phone]   [facility] [phone]   [facility] [phone]

## 2024-11-04 NOTE — ANESTHESIA POSTPROCEDURE EVALUATION
Post-Op Assessment Note    CV Status:  Stable  Pain Score: 0    Pain management: adequate       Mental Status:  Sleepy and arousable   Hydration Status:  Euvolemic   PONV Controlled:  Controlled   Airway Patency:  Patent     Post Op Vitals Reviewed: Yes    No anethesia notable event occurred.    Staff: CRNA   Comments: Pt able to maintain own airway, VSS, report to recovery RN    Reason for prolonged intubation > 24 hours:  ErrorReason for prolonged intubation > 48 hours:  Error      Last Filed PACU Vitals:  Vitals Value Taken Time   Temp 98.3 °F (36.8 °C) 10/29/24 2008   Pulse 70 10/29/24 2007   /60 10/29/24 2007   Resp 20 10/29/24 2007   SpO2 94% RA    Vitals shown include unfiled device data.    Modified [place]:  No data recorded

## 2024-11-05 ENCOUNTER — DOCUMENTATION (OUTPATIENT)
Dept: HEMATOLOGY ONCOLOGY | Facility: CLINIC | Age: 54
End: 2024-11-05

## 2024-11-05 DIAGNOSIS — C18.9 MALIGNANT NEOPLASM OF COLON, UNSPECIFIED PART OF COLON (HCC): Primary | ICD-10-CM

## 2024-11-05 NOTE — PROGRESS NOTES
Chart rvw'd on 11/5/2024    Referring provider-  Louis Llanos MD    Colonoscopy date-  10/24/2024    Impression-  Malignant appearing mass causing stricture in sigmoid colon biopsied.  22 [place] by 90 mm uncovered metal stent was placed successfully across stricture.    Pathology-  RESULTS OF IMMUNOHISTOCHEMICAL ANALYSIS FOR MISMATCH REPAIR PROTEIN LOSS     INTERPRETATION: No loss of nuclear expression of MMR proteins: low probability of MSI-H        RESULTS:  Antibody            Clone                 Description                     Results  MLH1                 M1                     Mismatch repair protein  Intact nuclear expression  MSH2                [id number]        Mismatch repair protein  Intact nuclear expression  MSH6                SP93                  Mismatch repair protein  Intact nuclear expression  PMS2                 A16                    Mismatch repair protein  Intact nuclear expression       A. Large Intestine, sigmoid colon:  - Intramucosal adenocarcinoma arising in a tubular adenoma with extensive high grade dysplasia. See comment.    Labs-  CEA 10/23/2024 13.4  CBC CMP UTD      Imaging-  10/21/2024 CT AP w contrast  Severe constipation. Distended fluid-filled loops of small bowel likely secondary to constipation however enteritis and/or low-grade obstruction not excluded     10/23/2024 CT chest w/o contrast  Partially imaged colonic obstruction in the upper abdomen.     No findings of metastatic disease in the chest.     Minimal aneurysmal dilation of the aortic root measuring up to 4.1 cm. Follow-up CT angiogram should be considered in 1 year.     Trace right pleural effusion.      Scheduled appointments-  Pt is scheduled with Dr Llanos (colorectal surgery) on 12/11/2024    Surgical date-  10/29/2024 LEFT EXTENDED COLECTOMY LAPAROSCOPIC- HAND ASSISTED, CREASTION DESENDING COLOSTOMY (Abdomen)       SIGMOIDOSCOPY FLEXIBLE (Abdomen)       Post surgical pathology-  A. Sigmoid colon,  resection:  - Moderately differentiated adenocarcinoma (6.8 cm), arising in a tubular adenoma.  - Tumor invades the visceral peritoneum (pT4).  - Lymphovascular and perineural invasion are present.  - All margins are negative for tumor.  - Twenty one lymph nodes, negative for malignancy (0/21).  - Two additional portions of colon with no pathologic abnormality.     Note: Intradepartmental consultation is in agreement.  Block A8 is sent to [facility] for MI Profile Testing.     COLON AND RECTUM: Resection   8th Edition - Protocol posted: 6/19/2024COLON AND RECTUM: RESECTION - All Specimens  SPECIMEN   Procedure  Sigmoidectomy   TUMOR   Tumor Site  Sigmoid colon   Histologic Type  Adenocarcinoma   Histologic Grade  G2, moderately differentiated   Tumor Size  Greatest dimension (Centimeters): 6.8 cm   Additional Dimension (Centimeters)  6.2 cm     0.4 cm   Tumor Extent  Invades visceral peritoneum   Macroscopic Tumor Perforation  Not identified   Lymphatic and / or Vascular Invasion  Small vessel   Perineural Invasion  Present   Tumor Budding Score  Intermediate (5-9)   Number of Tumor Buds  6 per 'hotspot' field   Type of Polyp in which Invasive Carcinoma Arose  Tubular adenoma   Treatment Effect  No known presurgical therapy   MARGINS   Margin Status for Invasive Carcinoma  All margins negative for invasive carcinoma   Closest Margin(s) to Invasive Carcinoma  Mesenteric   Distance from Invasive Carcinoma to Closest Margin  7.0 cm   Margin Status for Non-Invasive Tumor  All margins negative for high-grade dysplasia / intramucosal carcinoma and low-grade dysplasia   REGIONAL LYMPH NODES   Regional Lymph Node Status  All regional lymph nodes negative for tumor   Number of Lymph Nodes Examined  21   Tumor Deposits  Not identified   pTNM CLASSIFICATION (AJCC 8th Edition)   Reporting of pT, pN, and (when applicable) pM categories is based on information available to the pathologist at the time the report is  issued. As per the AJCC (Chapter 1, 8th Ed.) it is the managing physician's responsibility to establish the final pathologic stage based upon all pertinent information, including but potentially not limited to this pathology report.   pT Category  pT4a   pN Category  pN0   .

## 2024-11-08 DIAGNOSIS — K63.89 COLONIC MASS: Primary | ICD-10-CM

## 2024-11-18 PROBLEM — C18.7 ADENOCARCINOMA OF SIGMOID COLON (HCC): Status: ACTIVE | Noted: 2024-11-18

## 2024-11-18 LAB
CARIS GENOMIC LOH - EXOME: NORMAL
CARIS HER2/NEU: NEGATIVE
CARIS HLA-A: NORMAL
CARIS HLA-B: NORMAL
CARIS HLA-C: NORMAL
CARIS MISMATCH REPAIR STATUS: NORMAL
CARIS MLH1: NORMAL
CARIS MSH2: NORMAL
CARIS MSH6: NORMAL
CARIS MSI - EXOME: NORMAL
CARIS PD-L1 (SP142): NEGATIVE
CARIS PMS2: NORMAL
CARIS TMB - EXOME: NORMAL

## 2024-11-19 ENCOUNTER — CONSULT (OUTPATIENT)
Dept: HEMATOLOGY ONCOLOGY | Facility: CLINIC | Age: 54
End: 2024-11-19
Payer: COMMERCIAL

## 2024-11-19 VITALS
HEART RATE: 94 BPM | WEIGHT: 178 LBS | BODY MASS INDEX: 25.48 KG/M2 | SYSTOLIC BLOOD PRESSURE: 130 MMHG | TEMPERATURE: 97.6 F | RESPIRATION RATE: 18 BRPM | OXYGEN SATURATION: 99 % | DIASTOLIC BLOOD PRESSURE: 88 MMHG | HEIGHT: 70 IN

## 2024-11-19 DIAGNOSIS — C18.9 MALIGNANT NEOPLASM OF COLON, UNSPECIFIED PART OF COLON (HCC): ICD-10-CM

## 2024-11-19 DIAGNOSIS — C18.7 ADENOCARCINOMA OF SIGMOID COLON (HCC): Primary | ICD-10-CM

## 2024-11-19 PROCEDURE — 99245 OFF/OP CONSLTJ NEW/EST HI 55: CPT | Performed by: INTERNAL MEDICINE

## 2024-11-19 NOTE — PROGRESS NOTES
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  Initial pathologic diagnosis of moderately differentiated adenocarcinoma of the sigmoid colon with established at the time of colonoscopy and biopsy of obstructing mass involving the sigmoid colon on 2024.  The patient underwent R0 laparoscopic hand-assisted extended left hemicolectomy with mobilization of the splenic flexure of the colon and creation of a descending colostomy on 2024.  The patient has multiple pathologic and clinical parameters consistent with high risk pathologic stage IIB moderately differentiated adenocarcinoma of the sigmoid colon including:    Presentation with colonic obstruction  Mismatch repair proficient tumor (MMR-P) /microsatellite stable adenocarcinoma of the sigmoid colon  Presence of lymphovascular invasion and the primary tumor  Presence of perineural invasion in the primary tumor  T4a disease    Today, I presented in detail clinical information about the manifestations, diagnostic workup, staging, management, and prognosis of high risk stage IIB invasive moderately differentiated adenocarcinoma of the sigmoid colon, MMR-P primary tumor, consistent with microsatellite stable colon cancer.  Patient is at high risk for presence of micrometastatic disease and progression into gross metastases, particularly within the first 2 to 3 years after surgical resection of the primary  sigmoid colon cancer.  I discussed with the patient benefits and risks of adjuvant systemic therapy options for stage IIB, high risk adenocarcinoma of the sigmoid colon including:    Fluoropyrimidine chemotherapy plus oxaliplatin including regimen such as modified infusional 5-fluorouracil, bolus 5-fluorouracil, leucovorin, and oxaliplatin (modified FOLFOX6)  Capecitabine plus oxaliplatin (CapeOx).  Single agent capecitabine   Single agent infusional 5-fluorouracil     Benefits of adjuvant chemotherapy include prolongation of overall survival, prolongation of relapse free survival, and delay in the development of gross metastatic disease.  I described to the patient potential toxicity associated with fluoropyrimidine's and oxaliplatin.  Benefits of adjuvant chemotherapy outweigh risks.  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  He will have evaluation by his surgeon on December 11, 2024.  At that time he will discuss with the surgeon about the possibility of colostomy reversal.  I will see the patient for reassessment about adjuvant systemic therapy on December 12, 2024    Plan:  Provide written educational information about oxalipatin, 5FU, and capecitabine   Return to medical oncology clinic for history and physical exam on December 12, 2024, to make a final decision about adjuvant systemic therapy for stage IIB, high risk invasive moderately differentiated adenocarcinoma of the sigmoid colon versus observation       Patient understands and agrees with my management recommendations and plan of care. I answered questions to his satisfaction.        History of Present Illness     HPI  Darin Bullock is a 54 y.o. male with new diagnosis of pathologic T4, pathologic N0 (0 of 21 lymph nodes) clinical M0, high risk stage IIB  invasive moderately differentiated adenocarcinoma of the sigmoid colon, mismatch repair protein proficient (MMR-P), consistent with microsatellite stable colon cancer, presenting with colonic obstruction.  Initial pathologic diagnosis of moderately differentiated adenocarcinoma of the sigmoid colon with established at the time of colonoscopy and biopsy of obstructing mass involving the sigmoid colon on October 24, 2024.  The patient underwent R0 laparoscopic hand-assisted extended left hemicolectomy with mobilization of the splenic flexure of the colon and creation of a descending colostomy on October 29, 2024.     In summary, Mr. Pena presented to the emergency department with acute colonic obstruction secondary to a mass present in the left sigmoid colon the third week of October 2024.  On October 21, 2024, contrast-enhanced CT scan of the abdomen and pelvis showed distended fluid-filled loops of small bowel.  On October 23, 2024, non contrast CT scan of the chest showed a partially imaged colonic obstruction in the upper abdomen. There were no findings of metastatic disease in the chest. 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 The same day, serum CEA was 13.4 ng/mL. On October 24, 2024, colonoscopy showed a single malignant-appearing, fungating, invasive and ulcerated mass (not traversable) in the sigmoid colon, covering the whole circumference; performed cold forceps biopsy. There was a malignant-appearing stricture (not traversable) in the sigmoid colon. Pathology showed intramucosal adenocarcinoma arising in a tubular adenoma with extensive high grade dysplasia.  On October 24, 2024, the patient underwent placement of a sigmoid colonic metallic stent for treatment of colonic obstruction.  On October 29, 2024, the patient underwent R0 laparoscopic hand-assisted extended left hemicolectomy with mobilization of the splenic flexure, and creation of a descending  colostomy. Pathology showed moderately differentiated adenocarcinoma (6.8 cm), arising in a tubular adenoma. Tumor invades the visceral peritoneum (pT4). Lymphovascular and perineural invasion are present. All margins are negative for tumor. Twenty one lymph nodes, negative for malignancy (0/21), pN0. The primary tumor is MMR-P consistent with a DAO colon cancer.    Today, the patient feels well.  He is recovering well after his laparoscopic hand-assisted extended left hemicolectomy, with mobilization of the colonic splenic flexure and creation of a descending colostomy.  After his colonic surgery, he has not noticed new systemic, cardiorespiratory, gastrointestinal, genitourinary, dermatological, musculoskeletal, neurologic symptoms.            Review of Systems   Constitutional:  Negative for activity change, appetite change, chills, diaphoresis, fatigue, fever and unexpected weight change.   HENT:  Negative for congestion, dental problem, ear discharge, ear pain, facial swelling, hearing loss, mouth sores, nosebleeds, postnasal drip, rhinorrhea, sinus pain, sneezing, sore throat, tinnitus, trouble swallowing and voice change.    Eyes:  Negative for photophobia, pain, discharge, redness, itching and visual disturbance.   Respiratory:  Negative for apnea, cough, choking, chest tightness, shortness of breath, wheezing and stridor.    Cardiovascular:  Negative for chest pain, palpitations and leg swelling.   Gastrointestinal:  Negative for abdominal distention, abdominal pain, anal bleeding, blood in stool, constipation, diarrhea, nausea, rectal pain and vomiting.   Endocrine: Negative for cold intolerance and heat intolerance.   Genitourinary:  Negative for decreased urine volume, difficulty urinating, dysuria, enuresis, flank pain, frequency, hematuria and urgency.   Musculoskeletal:  Negative for arthralgias, back pain, gait problem, joint swelling, myalgias, neck pain and neck stiffness.   Skin:  Negative for  color change, pallor, rash and wound.   Neurological:  Negative for dizziness, tremors, seizures, syncope, facial asymmetry, speech difficulty, weakness, light-headedness, numbness and headaches.   Hematological:  Negative for adenopathy. Does not bruise/bleed easily.   Psychiatric/Behavioral:  Negative for behavioral problems, confusion, dysphoric mood and sleep disturbance. The patient is not nervous/anxious.      Past Medical History   No past medical history on file.  Past Surgical History:   Procedure Laterality Date    COLECTOMY LAPAROSCOPIC N/A 10/29/2024    Procedure: LEFT EXTENDED COLECTOMY LAPAROSCOPIC- HAND ASSISTED, CREASTION DESENDING COLOSTOMY;  Surgeon: EVERTON Llanos MD;  Location: [facility];  Service: Colorectal    MO SIGMOIDOSCOPY FLX DX W/COLLJ SPEC BR/WA IF PFRMD N/A 10/29/2024    Procedure: SIGMOIDOSCOPY FLEXIBLE;  Surgeon: EVERTON Llanos MD;  Location: [facility];  Service: Colorectal     No family history on file.   reports that he has never smoked. He has never been exposed to tobacco smoke. He has never used smokeless tobacco. He reports that he does not drink alcohol and does not use drugs.  Current Outpatient Medications on File Prior to Visit   Medication Sig Dispense Refill    acetaminophen (TYLENOL) 650 mg CR tablet Take 650 mg by mouth every 8 (eight) hours as needed for mild pain      enoxaparin (LOVENOX) 40 mg/0.4 mL Inject 0.4 mL (40 mg total) under the skin every 24 hours for 23 days 9.2 mL 0    gabapentin (NEURONTIN) 100 mg capsule Take 1 capsule (100 mg total) by mouth 3 (three) times a day for 14 days 42 capsule 0    methocarbamol (ROBAXIN) 500 mg tablet Take 1 tablet (500 mg total) by mouth every 6 (six) hours for 14 days 56 tablet 0    omeprazole (PriLOSEC) 40 MG capsule Take 40 mg by mouth daily      polyethylene glycol (MIRALAX) 17 g packet Take 17 g by mouth daily 12 each 0     No current facility-administered medications on file prior to visit.     Allergies    Allergen Reactions    Pollen Extract Allergic Rhinitis        Past Surgical History:   Procedure Laterality Date    COLECTOMY LAPAROSCOPIC N/A 10/29/2024    Procedure: LEFT EXTENDED COLECTOMY LAPAROSCOPIC- HAND ASSISTED, CREASTION DESENDING COLOSTOMY;  Surgeon: EVERTON Llanos MD;  [facility]: [facility];  Service: Colorectal    OK SIGMOIDOSCOPY FLX DX W/COLLJ SPEC BR/WA IF PFRMD N/A 10/29/2024    Procedure: SIGMOIDOSCOPY FLEXIBLE;  Surgeon: EVERTON Llanos MD;  Location: [facility];  Service: Colorectal      Social History     Socioeconomic History    Marital status: Single     Spouse name: Not on file    Number of children: Not on file    Years of education: Not on file    Highest education level: Not on file   Occupational History    Not on file   Tobacco Use    Smoking status: Never     Passive exposure: Never    Smokeless tobacco: Never   Substance and Sexual Activity    Alcohol use: Never    Drug use: Never    Sexual activity: Not on file   Other Topics Concern    Not on file   Social History Narrative    Not on file     Social Drivers of Health     Financial Resource Strain: Not on file   Food Insecurity: No Food Insecurity (10/23/2024)    Nursing - Inadequate Food Risk Classification     Worried About Running Out of Food in the Last Year: Not on file     Ran Out of Food in the Last Year: Not on file     Ran Out of Food in the Last Year: 1   Transportation Needs: No Transportation Needs (10/23/2024)    Nursing - Transportation Risk Classification     Lack of Transportation: Not on file     Lack of Transportation: 2   Physical Activity: Not on file   Stress: Not on file   Social Connections: Not on file   Intimate Partner Violence: Unknown (10/23/2024)    Nursing IPS     Feels Physically and Emotionally Safe: Not on file     Physically Hurt by Someone: Not on file     Humiliated or Emotionally Abused by Someone: Not on file     Physically Hurt by Someone: 2     Hurt or Threatened by Someone: 2   Housing  Stability: Unknown (10/23/2024)    Nursing: Inadequate Housing Risk Classification     Has Housing: Not on file     Worried About Losing Housing: Not on file     Unable to Get Utilities: Not on file     Unable to Pay for Housing in the Last Year: 2     Has Housin              Objective   There were no vitals taken for this visit.     Physical Exam  Vitals reviewed.   Constitutional:       General: He is not in acute distress.     Appearance: Normal appearance. He is normal weight. He is not toxic-appearing.   HENT:      Head: Normocephalic and atraumatic.      Nose: Nose normal. No congestion.      Mouth/Throat:      Mouth: Mucous membranes are moist.      Pharynx: Oropharynx is clear. No oropharyngeal exudate or posterior oropharyngeal erythema.   Eyes:      General: No scleral icterus.     Extraocular Movements: Extraocular movements intact.      Conjunctiva/sclera: Conjunctivae normal.      Pupils: Pupils are equal, round, and reactive to light.   Cardiovascular:      Rate and Rhythm: Normal rate and regular rhythm.      Pulses: Normal pulses.      Heart sounds: Normal heart sounds. No murmur heard.     No friction rub. No gallop.   Pulmonary:      Effort: Pulmonary effort is normal. No respiratory distress.      Breath sounds: Normal breath sounds. No stridor. No wheezing, rhonchi or rales.   Chest:      Chest wall: No tenderness.   Abdominal:      General: Bowel sounds are normal. There is no distension.      Palpations: Abdomen is soft. There is no mass.      Tenderness: There is no abdominal tenderness. There is no right CVA tenderness, left CVA tenderness, guarding or rebound.      Hernia: No hernia is present.   Musculoskeletal:         General: No swelling, tenderness or deformity. Normal range of motion.      Cervical back: Normal range of motion and neck supple. No rigidity or tenderness.      Right lower leg: No edema.      Left lower leg: No edema.   Lymphadenopathy:      Cervical: No cervical  adenopathy.   Skin:     General: Skin is warm and dry.      Capillary Refill: Capillary refill takes less than 2 seconds.      Coloration: Skin is not jaundiced or pale.      Findings: No bruising, erythema, lesion or rash.   Neurological:      General: No focal deficit present.      Mental Status: He is alert and oriented to person, place, and time. Mental status is at baseline.      Cranial Nerves: No cranial nerve deficit.      Sensory: No sensory deficit.      Motor: No weakness.      Coordination: Coordination normal.      Gait: Gait normal.      Deep Tendon Reflexes: Reflexes normal.   Psychiatric:         Mood and Affect: Mood normal.         Behavior: Behavior normal.         Thought Content: Thought content normal.

## 2024-11-19 NOTE — ASSESSMENT & PLAN NOTE
54-year-old male with new diagnosis of pathologic T4, pathologic N0 (0 of 21 lymph nodes) clinical M0, high risk stage IIB invasive moderately differentiated adenocarcinoma of the sigmoid colon, mismatch repair protein proficient (MMR-P), consistent with microsatellite stable colon cancer, presenting with colonic obstruction.  Initial pathologic diagnosis of moderately differentiated adenocarcinoma of the sigmoid colon with established at the time of colonoscopy and biopsy of obstructing mass involving the sigmoid colon on October 24, 2024.  The patient underwent R0 laparoscopic hand-assisted extended left hemicolectomy with mobilization of the splenic flexure of the colon and creation of a descending colostomy on October 29, 2024.  The patient has multiple pathologic and clinical parameters consistent with high risk pathologic stage IIB moderately differentiated adenocarcinoma of the sigmoid colon including:    Presentation with colonic obstruction  Mismatch repair proficient tumor (MMR-P) /microsatellite stable adenocarcinoma of the sigmoid colon  Presence of lymphovascular invasion and the primary tumor  Presence of perineural invasion in the primary tumor  T4a disease    Today, I presented in detail clinical information about the manifestations, diagnostic workup, staging, management, and prognosis of high risk stage IIB invasive moderately differentiated adenocarcinoma of the sigmoid colon, MMR-P primary tumor, consistent with microsatellite stable colon cancer.  Patient is at high risk for presence of micrometastatic disease and progression into gross metastases, particularly within the first 2 to 3 years after surgical resection of the primary sigmoid colon cancer.  I discussed with the patient benefits and risks of adjuvant systemic therapy options for stage IIB, high risk adenocarcinoma of the sigmoid colon including:    Fluoropyrimidine chemotherapy plus oxaliplatin including regimen such as modified  infusional 5-fluorouracil, bolus 5-fluorouracil, leucovorin, and oxaliplatin (modified FOLFOX6)  Capecitabine plus oxaliplatin (CapeOx).  Single agent capecitabine   Single agent infusional 5-fluorouracil     Benefits of adjuvant chemotherapy include prolongation of overall survival, prolongation of relapse free survival, and delay in the development of gross metastatic disease.  I described to the patient potential toxicity associated with fluoropyrimidine's and oxaliplatin.  Benefits of adjuvant chemotherapy outweigh risks.  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  He will have evaluation by his surgeon on December 11, 2024.  At that time he will discuss with the surgeon about the possibility of colostomy reversal.  I will see the patient for reassessment about adjuvant systemic therapy on December 12, 2024    Plan:  Provide written educational information about oxalipatin, 5FU, and capecitabine   Return to medical oncology clinic for history and physical exam on December 12, 2024, to make a final decision about adjuvant systemic therapy for stage IIB, high risk invasive moderately differentiated adenocarcinoma of the sigmoid colon versus observation

## 2024-11-22 ENCOUNTER — PATIENT OUTREACH (OUTPATIENT)
Dept: HEMATOLOGY ONCOLOGY | Facility: CLINIC | Age: 54
End: 2024-11-22

## 2024-11-22 NOTE — PROGRESS NOTES
I reached out to Darin  to complete the Distress Thermometer, review for any barriers to care and to offer any supportive services that may be needed. I left VM with the reason for my call including my direct phone number .

## 2024-11-25 ENCOUNTER — PATIENT OUTREACH (OUTPATIENT)
Dept: HEMATOLOGY ONCOLOGY | Facility: CLINIC | Age: 54
End: 2024-11-25

## 2024-11-25 NOTE — PROGRESS NOTES
I reached out to Darin  to complete the Distress Thermometer, review for any barriers to care and to offer any supportive services that may be needed. I left SHANTI with the reason for my call including my direct phone number .    I reached out and spoke with Darin  now that consults have been completed with the oncology teams to review for any barriers to care and offer supportive services as needed. Distress Thermometer completed at this time. Patient scored 2/10. Based on responses to DT, no indication for referral to SW needed at this time. . I reviewed and updated the members assigned to the care team in [facility].   He knows the members of the care team as well as how and when to contact them with any needs.   He verbalizes managing the schedules well.   He is currently able to drive and denies any transportation needs.    He denies any uncontrolled symptoms. Discussed role of Palliative Care in symptom and side effect management. Declined referral at this time.  He states that he is eating and drinking as per usual with no unintentional weight loss.     Patient does not smoke.   He states he is well supported by family and friends.  Community support groups discussed including the [facility] Support [facility] of the [facility]. Patient declined information at this time.   He feels he has adequate insurance coverage and denies any financial concerns at this time.     Based on individual needs I will follow up in about 3 weeks. I have provided my direct contact information and welcome them to contact me if needs as discussed above change. He was appreciative for the call.

## 2024-12-06 NOTE — PROGRESS NOTES
Colon and Rectal Surgery   Darin Bullock 54 y.o. male MRN [id number]  Encounter: [id number]  12/11/24 2:59 PM            Assessment: Darin Bullock is a 54 y.o. male who had obstructing sigmoid cancer.      Plan:   Adenocarcinoma of sigmoid colon (HCC)  Recent removal of stage II, T4a N0 M0, sigmoid cancer.  He is feeling well after surgery.    I recommend follow-up chemotherapy but the patient declined chemotherapy until he has his stoma closed due to personal work issues.  I explained the risk of reduced benefit of chemotherapy with this delay.  He understands.  He wants to have his stoma closed before chemotherapy.  For that reason, we will schedule him in the near future.    Laparoscopic closure of the Will colostomy is recommended.  He understands that the reason that we had done the colostomy was a very thick-walled the colon.  I expect that this will be reduced toward its normal level if that was an abnormal position after obstructive disease at the time of his treatment.  Colonoscopy will be done before surgery.    Risks of surgery, including but not limited to bleeding, need for transfusion of blood products, pain, infection, hernia formation, injury to the ureter or other internal organs requiring surgery specific to these injuries, anastomotic leak, bowel obstruction, impotence, bladder dysfunction, deep vein thrombosis, renal failure, pulmonary embolism, myocardial infarction or heart failure, stroke, death, need for and enterostomy, or other unspecified complications were discussed. Benefits and alternatives were discussed. Questions were answered.        Subjective     HPI    Darin Bullock is a 54 y.o. male who presents for a post operative evaluation.    The patient states he is doing well; emptying ostomy bag 4-5 times per day, with a good appetite and having no abdominal pain.     The patient is status post laparoscopic hand assisted left extended colectomy, mobilization of splenic flexure,  creation of descending colostomy on 10/29/2024.     Operative Findings:  Stent in sigmoid colon mass  Redundant sigmoid  Thickened descending colon wall and mildly large descending colon and   rectum.    Surgical pathology:  A. Sigmoid colon, resection:  - Moderately differentiated adenocarcinoma (6.8 cm), arising in a tubular adenoma.  - Tumor invades the visceral peritoneum (pT4).  - Lymphovascular and perineural invasion are present.  - All margins are negative for tumor.  - Twenty one lymph nodes, negative for malignancy (0/21).  - Two additional portions of colon with no pathologic abnormality.  ~Note: Intradepartmental consultation is in agreement.  Block A8 is sent to [facility] for MI Profile Testing.     Addendum   This addendum is issued to add immunohistochemistry results. The final diagnosis stays the same.  Immunohistochemistry was performed on block A6. The tumor cells are positive for CDX2, SATB2, consistent with colorectal primary.       Historical Information   No past medical history on file.  Past Surgical History:   Procedure Laterality Date    COLECTOMY LAPAROSCOPIC N/A 10/29/2024    Procedure: LEFT EXTENDED COLECTOMY LAPAROSCOPIC- HAND ASSISTED, CREASTION DESENDING COLOSTOMY;  Surgeon: EVERTON Llanos MD;  [facility];  Service: Colorectal    OH SIGMOIDOSCOPY FLX DX W/COLLJ SPEC BR/WA IF PFRMD N/A 10/29/2024    Procedure: SIGMOIDOSCOPY FLEXIBLE;  Surgeon: EVERTON Llanos MD;  [facility];  Service: Colorectal       Meds/Allergies       Current Outpatient Medications:     acetaminophen (TYLENOL) 650 mg CR tablet, Take 650 mg by mouth every 8 (eight) hours as needed for mild pain, Disp: , Rfl:     omeprazole (PriLOSEC) 40 MG capsule, Take 40 mg by mouth daily, Disp: , Rfl:     polyethylene glycol (MIRALAX) 17 g packet, Take 17 g by mouth daily, Disp: 12 each, Rfl: 0    enoxaparin (LOVENOX) 40 mg/0.4 mL, Inject 0.4 mL (40 mg total) under the skin every 24 hours for  "23 days, Disp: 9.2 mL, Rfl: 0    gabapentin (NEURONTIN) 100 mg capsule, Take 1 capsule (100 mg total) by mouth 3 (three) times a day for 14 days, Disp: 42 capsule, Rfl: 0    methocarbamol (ROBAXIN) 500 mg tablet, Take 1 tablet (500 mg total) by mouth every 6 (six) hours for 14 days, Disp: 56 tablet, Rfl: 0  Allergies   Allergen Reactions    Pollen Extract Allergic Rhinitis       Social History   Social History     Substance and Sexual Activity   Drug Use Never     Social History     Tobacco Use   Smoking Status Never    Passive exposure: Never   Smokeless Tobacco Never         No family history on file.      Review of Systems   Constitutional: Negative.    Respiratory: Negative.     Cardiovascular: Negative.    Gastrointestinal: Negative.        Objective   Current Vitals:  Vitals:    12/11/24 1422   Weight: 83.5 kg (184 lb)   Height: 5' 10\" (1.778 m)         Physical Exam  Constitutional:       Appearance: Normal appearance.   Cardiovascular:      Rate and Rhythm: Normal rate and regular rhythm.   Pulmonary:      Effort: Pulmonary effort is normal.      Breath sounds: Normal breath sounds.   Abdominal:      General: Abdomen is flat.      Palpations: Abdomen is soft. There is no mass.      Tenderness: There is no abdominal tenderness. There is no guarding.   Neurological:      General: No focal deficit present.      Mental Status: He is alert and oriented to person, place, and time.   Psychiatric:         Mood and Affect: Mood normal.         Behavior: Behavior normal.               "

## 2024-12-06 NOTE — H&P (VIEW-ONLY)
Colon and Rectal Surgery   Darin Bullock 54 y.o. male MRN [id number]  Encounter: [id number]  12/11/24 2:59 PM            Assessment: Darin Bullock is a 54 y.o. male who had obstructing sigmoid cancer.      Plan:   Adenocarcinoma of sigmoid colon (HCC)  Recent removal of stage II, T4a N0 M0, sigmoid cancer.  He is feeling well after surgery.    I recommend follow-up chemotherapy but the patient declined chemotherapy until he has his stoma closed due to personal work issues.  I explained the risk of reduced benefit of chemotherapy with this delay.  He understands.  He wants to have his stoma closed before chemotherapy.  For that reason, we will schedule him in the near future.    Laparoscopic closure of the Will colostomy is recommended.  He understands that the reason that we had done the colostomy was a very thick-walled the colon.  I expect that this will be reduced toward its normal level if that was an abnormal position after obstructive disease at the time of his treatment.  Colonoscopy will be done before surgery.    Risks of surgery, including but not limited to bleeding, need for transfusion of blood products, pain, infection, hernia formation, injury to the ureter or other internal organs requiring surgery specific to these injuries, anastomotic leak, bowel obstruction, impotence, bladder dysfunction, deep vein thrombosis, renal failure, pulmonary embolism, myocardial infarction or heart failure, stroke, death, need for and enterostomy, or other unspecified complications were discussed. Benefits and alternatives were discussed. Questions were answered.        Subjective     HPI    Darin Bullock is a 54 y.o. male who presents for a post operative evaluation.    The patient states he is doing well; emptying ostomy bag 4-5 times per day, with a good appetite and having no abdominal pain.     The patient is status post laparoscopic hand assisted left extended colectomy, mobilization of splenic flexure,  creation of descending colostomy on 10/29/2024.     Operative Findings:  Stent in sigmoid colon mass  Redundant sigmoid  Thickened descending colon wall and mildly large descending colon and   rectum.    Surgical pathology:  A. Sigmoid colon, resection:  - Moderately differentiated adenocarcinoma (6.8 cm), arising in a tubular adenoma.  - Tumor invades the visceral peritoneum (pT4).  - Lymphovascular and perineural invasion are present.  - All margins are negative for tumor.  - Twenty one lymph nodes, negative for malignancy (0/21).  - Two additional portions of colon with no pathologic abnormality.  ~Note: Intradepartmental consultation is in agreement.  Block A8 is sent to [facility] for MI Profile Testing.     Addendum   This addendum is issued to add immunohistochemistry results. The final diagnosis stays the same.  Immunohistochemistry was performed on block A6. The tumor cells are positive for CDX2, SATB2, consistent with colorectal primary.       Historical Information   No past medical history on file.  Past Surgical History:   Procedure Laterality Date    COLECTOMY LAPAROSCOPIC N/A 10/29/2024    Procedure: LEFT EXTENDED COLECTOMY LAPAROSCOPIC- HAND ASSISTED, CREASTION DESENDING COLOSTOMY;  Surgeon: EVERTON Llanos MD;  [facility];  Service: Colorectal    AL SIGMOIDOSCOPY FLX DX W/COLLJ SPEC BR/WA IF PFRMD N/A 10/29/2024    Procedure: SIGMOIDOSCOPY FLEXIBLE;  Surgeon: EVERTON Llanos MD;  [facility];  Service: Colorectal       Meds/Allergies       Current Outpatient Medications:     acetaminophen (TYLENOL) 650 mg CR tablet, Take 650 mg by mouth every 8 (eight) hours as needed for mild pain, Disp: , Rfl:     omeprazole (PriLOSEC) 40 MG capsule, Take 40 mg by mouth daily, Disp: , Rfl:     polyethylene glycol (MIRALAX) 17 g packet, Take 17 g by mouth daily, Disp: 12 each, Rfl: 0    enoxaparin (LOVENOX) 40 mg/0.4 mL, Inject 0.4 mL (40 mg total) under the skin every 24 hours for  "23 days, Disp: 9.2 mL, Rfl: 0    gabapentin (NEURONTIN) 100 mg capsule, Take 1 capsule (100 mg total) by mouth 3 (three) times a day for 14 days, Disp: 42 capsule, Rfl: 0    methocarbamol (ROBAXIN) 500 mg tablet, Take 1 tablet (500 mg total) by mouth every 6 (six) hours for 14 days, Disp: 56 tablet, Rfl: 0  Allergies   Allergen Reactions    Pollen Extract Allergic Rhinitis       Social History   Social History     Substance and Sexual Activity   Drug Use Never     Social History     Tobacco Use   Smoking Status Never    Passive exposure: Never   Smokeless Tobacco Never         No family history on file.      Review of Systems   Constitutional: Negative.    Respiratory: Negative.     Cardiovascular: Negative.    Gastrointestinal: Negative.        Objective   Current Vitals:  Vitals:    12/11/24 1422   Weight: 83.5 kg (184 lb)   Height: 5' 10\" (1.778 m)         Physical Exam  Constitutional:       Appearance: Normal appearance.   Cardiovascular:      Rate and Rhythm: Normal rate and regular rhythm.   Pulmonary:      Effort: Pulmonary effort is normal.      Breath sounds: Normal breath sounds.   Abdominal:      General: Abdomen is flat.      Palpations: Abdomen is soft. There is no mass.      Tenderness: There is no abdominal tenderness. There is no guarding.   Neurological:      General: No focal deficit present.      Mental Status: He is alert and oriented to person, place, and time.   Psychiatric:         Mood and Affect: Mood normal.         Behavior: Behavior normal.               "

## 2024-12-06 NOTE — H&P (VIEW-ONLY)
Colon and Rectal Surgery   Darin Bullock 54 y.o. male MRN [id number]  Encounter: [id number]  12/11/24 2:59 PM            Assessment: Darin Bullock is a 54 y.o. male who had obstructing sigmoid cancer.      Plan:   Adenocarcinoma of sigmoid colon (HCC)  Recent removal of stage II, T4a N0 M0, sigmoid cancer.  He is feeling well after surgery.    I recommend follow-up chemotherapy but the patient declined chemotherapy until he has his stoma closed due to personal work issues.  I explained the risk of reduced benefit of chemotherapy with this delay.  He understands.  He wants to have his stoma closed before chemotherapy.  For that reason, we will schedule him in the near future.    Laparoscopic closure of the Will colostomy is recommended.  He understands that the reason that we had done the colostomy was a very thick-walled the colon.  I expect that this will be reduced toward its normal level if that was an abnormal position after obstructive disease at the time of his treatment.  Colonoscopy will be done before surgery.    Risks of surgery, including but not limited to bleeding, need for transfusion of blood products, pain, infection, hernia formation, injury to the ureter or other internal organs requiring surgery specific to these injuries, anastomotic leak, bowel obstruction, impotence, bladder dysfunction, deep vein thrombosis, renal failure, pulmonary embolism, myocardial infarction or heart failure, stroke, death, need for and enterostomy, or other unspecified complications were discussed. Benefits and alternatives were discussed. Questions were answered.        Subjective     HPI    Darin Bullock is a 54 y.o. male who presents for a post operative evaluation.    The patient states he is doing well; emptying ostomy bag 4-5 times per day, with a good appetite and having no abdominal pain.     The patient is status post laparoscopic hand assisted left extended colectomy, mobilization of splenic flexure,  creation of descending colostomy on 10/29/2024.     Operative Findings:  Stent in sigmoid colon mass  Redundant sigmoid  Thickened descending colon wall and mildly large descending colon and   rectum.    Surgical pathology:  A. Sigmoid colon, resection:  - Moderately differentiated adenocarcinoma (6.8 cm), arising in a tubular adenoma.  - Tumor invades the visceral peritoneum (pT4).  - Lymphovascular and perineural invasion are present.  - All margins are negative for tumor.  - Twenty one lymph nodes, negative for malignancy (0/21).  - Two additional portions of colon with no pathologic abnormality.  ~Note: Intradepartmental consultation is in agreement.  Block A8 is sent to [facility] for MI Profile Testing.     Addendum   This addendum is issued to add immunohistochemistry results. The final diagnosis stays the same.  Immunohistochemistry was performed on block A6. The tumor cells are positive for CDX2, SATB2, consistent with colorectal primary.       Historical Information   No past medical history on file.  Past Surgical History:   Procedure Laterality Date    COLECTOMY LAPAROSCOPIC N/A 10/29/2024    Procedure: LEFT EXTENDED COLECTOMY LAPAROSCOPIC- HAND ASSISTED, CREASTION DESENDING COLOSTOMY;  Surgeon: EVERTON Llanos MD;  [facility];  Service: Colorectal    WA SIGMOIDOSCOPY FLX DX W/COLLJ SPEC BR/WA IF PFRMD N/A 10/29/2024    Procedure: SIGMOIDOSCOPY FLEXIBLE;  Surgeon: EVERTON Llanos MD;  [facility];  Service: Colorectal       Meds/Allergies       Current Outpatient Medications:     acetaminophen (TYLENOL) 650 mg CR tablet, Take 650 mg by mouth every 8 (eight) hours as needed for mild pain, Disp: , Rfl:     omeprazole (PriLOSEC) 40 MG capsule, Take 40 mg by mouth daily, Disp: , Rfl:     polyethylene glycol (MIRALAX) 17 g packet, Take 17 g by mouth daily, Disp: 12 each, Rfl: 0    enoxaparin (LOVENOX) 40 mg/0.4 mL, Inject 0.4 mL (40 mg total) under the skin every 24 hours for  "23 days, Disp: 9.2 mL, Rfl: 0    gabapentin (NEURONTIN) 100 mg capsule, Take 1 capsule (100 mg total) by mouth 3 (three) times a day for 14 days, Disp: 42 capsule, Rfl: 0    methocarbamol (ROBAXIN) 500 mg tablet, Take 1 tablet (500 mg total) by mouth every 6 (six) hours for 14 days, Disp: 56 tablet, Rfl: 0  Allergies   Allergen Reactions    Pollen Extract Allergic Rhinitis       Social History   Social History     Substance and Sexual Activity   Drug Use Never     Social History     Tobacco Use   Smoking Status Never    Passive exposure: Never   Smokeless Tobacco Never         No family history on file.      Review of Systems   Constitutional: Negative.    Respiratory: Negative.     Cardiovascular: Negative.    Gastrointestinal: Negative.        Objective   Current Vitals:  Vitals:    12/11/24 1422   Weight: 83.5 kg (184 lb)   Height: 5' 10\" (1.778 m)         Physical Exam  Constitutional:       Appearance: Normal appearance.   Cardiovascular:      Rate and Rhythm: Normal rate and regular rhythm.   Pulmonary:      Effort: Pulmonary effort is normal.      Breath sounds: Normal breath sounds.   Abdominal:      General: Abdomen is flat.      Palpations: Abdomen is soft. There is no mass.      Tenderness: There is no abdominal tenderness. There is no guarding.   Neurological:      General: No focal deficit present.      Mental Status: He is alert and oriented to person, place, and time.   Psychiatric:         Mood and Affect: Mood normal.         Behavior: Behavior normal.               "

## 2024-12-11 ENCOUNTER — OFFICE VISIT (OUTPATIENT)
Age: 54
End: 2024-12-11

## 2024-12-11 ENCOUNTER — PREP FOR PROCEDURE (OUTPATIENT)
Age: 54
End: 2024-12-11

## 2024-12-11 VITALS — HEIGHT: 70 IN | WEIGHT: 184 LBS | BODY MASS INDEX: 26.34 KG/M2

## 2024-12-11 DIAGNOSIS — C18.7 ADENOCARCINOMA OF SIGMOID COLON (HCC): ICD-10-CM

## 2024-12-11 DIAGNOSIS — C18.7 ADENOCARCINOMA OF SIGMOID COLON (HCC): Primary | ICD-10-CM

## 2024-12-11 DIAGNOSIS — C18.9 MALIGNANT NEOPLASM OF COLON, UNSPECIFIED PART OF COLON (HCC): ICD-10-CM

## 2024-12-11 PROCEDURE — 99024 POSTOP FOLLOW-UP VISIT: CPT | Performed by: COLON & RECTAL SURGERY

## 2024-12-11 RX ORDER — METRONIDAZOLE 250 MG/1
1000 TABLET ORAL 3 TIMES DAILY
OUTPATIENT
Start: 2024-12-11 | End: 2024-12-12

## 2024-12-11 RX ORDER — HEPARIN SODIUM 5000 [USP'U]/ML
5000 INJECTION, SOLUTION INTRAVENOUS; SUBCUTANEOUS ONCE
OUTPATIENT
Start: 2024-12-11 | End: 2024-12-11

## 2024-12-11 RX ORDER — NEOMYCIN SULFATE 500 MG/1
1000 TABLET ORAL 3 TIMES DAILY
OUTPATIENT
Start: 2024-12-11 | End: 2024-12-12

## 2024-12-11 NOTE — TELEPHONE ENCOUNTER
Colonoscopy scheduled for 12/30/24 at [facility], instructions handed to patient. Patient scheduled for surgery  12/31/2024  at [facility] [place]. Instructions and PAT's gone over with and handed to the patient.     No SOC per TR   Labs   Antibiotics   Post Op    ----- Message from EVERTON Llanos MD sent at 12/11/2024  2:59 PM EST -----  Colonoscopy then OR  Urology for stents, at OR

## 2024-12-11 NOTE — LETTER
Darin Bullock  [address] [place] [id number]        12/11/2024    Dear Darin Bullock,    Your surgery is scheduled for: 12/31/2024   The hospital will call the evening prior to your surgery with your expected arrival time.   Location:[facility] [address] [facility] [address]    CHECK LIST PRIOR TO INPATIENT SURGERY  It is your responsibility to obtain any/all referrals needed for your surgery if required by your insurance. Our office will contact you to discuss your insurance coverage for this procedure.    Special instructions required if you are taking any blood thinners. Please verify with the prescribing physician. Examples include Coumadin, Plavix, Xarelto, Eliquis, Pradaxa, etc.    Please check with your family physician if you are taking the following medications: Aspirin or any Aspirin containing medication, Gingko biloba, Ginseng, Feverfew, and/or Tao’s Wort. We suggest stopping these for 3 days.     The night before and the day of your surgery, wash from your neck to groin with chlorhexidine soap.  This soap is available at most retail pharmacies under such brand names as Hibiclens, Endure or Aplicare.    Pre-admission testing Required: YES X NO     BOWEL PREPARATION REQUIRED: YES X NO   If yes, start date: 12/29/30. Please see attached bowel preparation instructions.    NOTHING TO EAT OR DRINK AFTER MIDNIGHT PRIOR TO SURGERY.    Please do not hesitate to call our office with any questions regarding your surgery.                             MIRALAX/GATORADE SURGERY PREPARATION INSTRUCTIONS    Purchase:   (1) 238g bottle of MiraLax or Glycolax - no prescription needed   4 Dulcolax Laxative Tablets - no prescription needed   64 oz. bottle of Gatorade (NOT RED), Crystal Light, or another clear liquid of  choice.   **REMEMBER** A prescription for antibiotics has been called into your pharmacy for  prior to your surgery.    One Day Prior to Surgery  Have a  normal breakfast, light lunch, and clear liquids thereafter.  It is important that you drink plenty of clear liquids throughout the day to prevent dehydration.  CLEAR LIQUIDS INCLUDE:  Water, Clear Fruit Juice (Apple, Cranberry, Grape), Black Coffee, Tea, Ginger Ale, Gatorade, Faraz-Aid, Hi-C, plain Jello, Ice Pops, Clear Broth or Bouillon, Crystal Light, Lemonade, Soda (regular or diet).    No Milk Products!    At 1:00 pm, take (4) Dulcolax Tablets with 8 oz. of water.  Swallow the tablets whole with a full glass of water.  The package may direct you not to exceed (2) tablets at any time but for the purpose of this examination, you should take (4).    At 1:00 pm, take your first dose of antibiotic as prescribed.    At 1:00 pm, Mix the 238g bottle of MiraLax in 64 oz. of Gatorade and shake the solution until the MiraLax is dissolved.  Drink 8 oz. glassfuls at your own pace.  It may take 3-4 hours to drink all the solution.    At 10:00 pm, take your last dose of antibiotic as prescribed.    REMEMBER TO STAY CLOSE TO TOILET FACILITIES.    The Day of Surgery  Take nothing by mouth until after surgery.        DAY PRIOR TO SURGERY, (after your colonoscopy) :    Follow the clear liquid diet provided above ALL DAY PRIOR to surgery     Remember to take your pre op antibiotics at 1 pm and 10 pm.    NOTHING IS TO BE TAKEN BY MOUTH AFTER MIDNIGHT.                         Post-Operative Care Information  Abdominal Surgery  What are my post-operative instructions?   The following information and instructions are for your continued care after discharge from the hospital. Please read the information (including the After Visit Summary provided to you at the time of discharge) carefully. If you have any questions or concerns, please contact the clinical staff at [phone]    How do I take care of my incision?  Your incision(s) may have surgical glue, dissolvable sutures with white pieces of tape called steri strips, or staples.    If you have steri strips or surgical glue, do not remove them. Steri strips will fall off naturally in 7-10 days. Surgical glue (Exofin) will fall off over a period of up to 2-3 weeks.   Do not put any topical ointments or lotions on the incisions.   Avoid tight clothing around your incision(s) or fabric that may irritate your skin such as wool, hooks and mariel.     Should I continue taking my prescribed medications?   Take medications as prescribed. Please review the After Visit Summary provided to you at the time of discharge for details.     How will I manage my pain at home?  For best pain control take your pain medication at regular intervals for the first couple of days, about every 4-6 hours. This will prevent pain build-up, which occurs when medication is taken on an as needed basis.   Use only as much prescription pain medication as you need (i.e. 1 tablet instead of 2).  Taper off the prescription pain medication as pain decreases, stopping narcotics first.   Use over-the-counter acetaminophen (Tylenolâ) if your doctor allows. When using over-the-counter medication, never use more than what is prescribed on the package directions. Do not take more than 3000mg of Tylenolâ in a 24 hour period. Do not take more than 2400mg of Ibuprofen (such as Motrinâ or Advilâ) in a 24 hour period.   While taking prescription pain medication you may not drive, work, or drink alcohol.     Are there any diet restrictions?   Continue low fiber diet up to 1 week post op.  (This allows the bowel to rest)    It is normal for bowel movements to be loose and occur more frequently post-operatively. This should improve over time.  During this time pay attention to hydration  1.5 weeks post op you may slowly begin to add fiber back into your diet.    By 2 weeks post op you may resume a normal high fiber diet.     What activity restrictions will I have?   Walk as much as tolerated.  Do not lift, pull, or push objects greater than  10 pounds for 6 weeks. This includes vacuuming, lifting children or groceries, walking the dog, mowing lawns, snow shoveling, etc. Please discuss other specific physical activities with the doctor at your post op appointment.   Do not drive while taking pain medications. You may drive when you have no pain - usually in 1-2 weeks following surgery.   You may climb stairs in moderation but do not overtire.  Resume sexual activity after you are cleared by your doctor.             When will I receive follow-up care?   You will be scheduled to return to see your physician in the office typically in 3-4 weeks after surgery.    If you do not have a scheduled appointment at the time of discharge, please call the office at [phone].    When should I call my doctor?   Notify your doctor for any of the following signs and symptoms of possible infection:   Temperature above 101 degrees.   Increase in pain or discomfort.   Redness, swelling, drainage at incision site(s). A small amount of clear yellow or pinkish-yellow drainage is normal  If incision begins to open.     Call if you have any changes in your overall health such as having:   Nausea   Vomiting   Chills   profuse (excessive) sweating   inability to urinate or completely empty bladder   diarrhea   constipation

## 2024-12-11 NOTE — ASSESSMENT & PLAN NOTE
Recent removal of stage II, T4a N0 M0, sigmoid cancer.  He is feeling well after surgery.    I recommend follow-up chemotherapy but the patient declined chemotherapy until he has his stoma closed due to personal work issues.  I explained the risk of reduced benefit of chemotherapy with this delay.  He understands.  He wants to have his stoma closed before chemotherapy.  For that reason, we will schedule him in the near future.    Laparoscopic closure of the Will colostomy is recommended.  He understands that the reason that we had done the colostomy was a very thick-walled the colon.  I expect that this will be reduced toward its normal level if that was an abnormal position after obstructive disease at the time of his treatment.  Colonoscopy will be done before surgery.    Risks of surgery, including but not limited to bleeding, need for transfusion of blood products, pain, infection, hernia formation, injury to the ureter or other internal organs requiring surgery specific to these injuries, anastomotic leak, bowel obstruction, impotence, bladder dysfunction, deep vein thrombosis, renal failure, pulmonary embolism, myocardial infarction or heart failure, stroke, death, need for and enterostomy, or other unspecified complications were discussed. Benefits and alternatives were discussed. Questions were answered.

## 2024-12-12 ENCOUNTER — DOCUMENTATION (OUTPATIENT)
Age: 54
End: 2024-12-12

## 2024-12-12 ENCOUNTER — OFFICE VISIT (OUTPATIENT)
Dept: HEMATOLOGY ONCOLOGY | Facility: CLINIC | Age: 54
End: 2024-12-12
Payer: COMMERCIAL

## 2024-12-12 VITALS
RESPIRATION RATE: 18 BRPM | SYSTOLIC BLOOD PRESSURE: 118 MMHG | DIASTOLIC BLOOD PRESSURE: 82 MMHG | HEART RATE: 89 BPM | TEMPERATURE: 97.2 F | HEIGHT: 70 IN | WEIGHT: 181 LBS | OXYGEN SATURATION: 98 % | BODY MASS INDEX: 25.91 KG/M2

## 2024-12-12 DIAGNOSIS — C18.7 ADENOCARCINOMA OF SIGMOID COLON (HCC): Primary | ICD-10-CM

## 2024-12-12 PROCEDURE — 99214 OFFICE O/P EST MOD 30 MIN: CPT | Performed by: INTERNAL MEDICINE

## 2024-12-12 RX ORDER — CAPECITABINE 500 MG/1
2000 TABLET, FILM COATED ORAL 2 TIMES DAILY
Qty: 112 TABLET | Refills: 5 | Status: SHIPPED | OUTPATIENT
Start: 2024-12-12

## 2024-12-12 NOTE — PROGRESS NOTES
Received request from clinical for patient to start on Capecitabine 2000mg. Auth has been submitted via cover my meds and this is pending and marked urgent    BIN:       [id number]  PCN:      [id number]  ID:          [id number]    (Key: [id number])    UPDATE:  This has been approved.

## 2024-12-12 NOTE — ASSESSMENT & PLAN NOTE
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  Initial pathologic diagnosis of moderately differentiated adenocarcinoma of the sigmoid colon with established at the time of colonoscopy and biopsy of obstructing mass involving the sigmoid colon on October 24, 2024.  The patient underwent R0 laparoscopic hand-assisted extended left hemicolectomy with mobilization of the splenic flexure of the colon and creation of a descending colostomy on October 29, 2024.  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  Patient is at high risk for presence of micrometastatic disease and progression into gross metastases, particularly within the first 2 to 3 years after surgical resection of the primary sigmoid colon cancer.  I discussed with the patient benefits and risks of adjuvant systemic therapy options for stage IIB, high risk adenocarcinoma of the sigmoid colon including:     Fluoropyrimidine chemotherapy plus oxaliplatin including regimen such as modified infusional  5-fluorouracil, bolus 5-fluorouracil, leucovorin, and oxaliplatin (modified FOLFOX6)  Capecitabine plus oxaliplatin (CapeOx).  Single agent capecitabine   Single agent infusional 5-fluorouracil      Benefits of adjuvant chemotherapy include prolongation of overall survival, prolongation of relapse free survival, and delay in the development of gross metastatic disease.  I described to the patient potential toxicity associated with fluoropyrimidine's and oxaliplatin.  Benefits of adjuvant chemotherapy outweigh risks.  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      Interim assessment: After careful consideration of different options for adjuvant chemotherapy for high risk stage IIB moderately differentiated adenocarcinoma of the sigmoid colon, MMR-P/DAO, I made a decision with the patient to proceed with adjuvant capecitabine 2000 mg/m²/day in 2 divided doses daily 1 week on 1 week of for 3 to 6 months (single agent capecitabine).  The patient will not receive adjuvant oxaliplatin.  Benefits of adjuvant capecitabine for high risk stage IIB adenocarcinoma of the sigmoid colon include prolongation of overall survival, prolongation of relapse free survival.  Patient has a clear understanding of potential adverse events that may be associated with capecitabine including hand-foot syndrome.  Benefits of adjuvant capecitabine outweigh any potential treatment related toxicity.  The patient will have reversal of colostomy on December 31, 2024.  Subsequently, he will initiate adjuvant single agent capecitabine the third week of January 2025. He will return to medical oncology clinic for history and physical exam and to assess safety and tolerability of capecitabine in late January 2025.    Plan:  Colostomy reversal on December 31,  2024  Adjuvant capecitabine 2000 mg/m²/day in 2 divided doses, 1 week on, 1 week off, for total duration of adjuvant chemotherapy of 6 months.  Initial date of adjuvant chemotherapy will be the third week of January 2025  Return to medical oncology clinic for history and physical exam and to assess tolerability of capecitabine in late January 2025    Patient understands and agrees with my management recommendations and plan of care. I answered questions to his satisfaction.    Orders:    CEA; Future    CBC and differential; Future    Comprehensive metabolic panel; Future

## 2024-12-12 NOTE — PROGRESS NOTES
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  Initial pathologic diagnosis of moderately differentiated adenocarcinoma of the sigmoid colon with established at the time of colonoscopy and biopsy of obstructing mass involving the sigmoid colon on 2024.  The patient underwent R0 laparoscopic hand-assisted extended left hemicolectomy with mobilization of the splenic flexure of the colon and creation of a descending colostomy on 2024.  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  Patient is at high risk for presence of micrometastatic disease and progression into gross metastases, particularly within the first 2 to 3 years after surgical resection of the primary sigmoid colon cancer.  I  discussed with the patient benefits and risks of adjuvant systemic therapy options for stage IIB, high risk adenocarcinoma of the sigmoid colon including:     Fluoropyrimidine chemotherapy plus oxaliplatin including regimen such as modified infusional 5-fluorouracil, bolus 5-fluorouracil, leucovorin, and oxaliplatin (modified FOLFOX6)  Capecitabine plus oxaliplatin (CapeOx).  Single agent capecitabine   Single agent infusional 5-fluorouracil      Benefits of adjuvant chemotherapy include prolongation of overall survival, prolongation of relapse free survival, and delay in the development of gross metastatic disease.  I described to the patient potential toxicity associated with fluoropyrimidine's and oxaliplatin.  Benefits of adjuvant chemotherapy outweigh risks.  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      Interim assessment: After careful consideration of different options for adjuvant chemotherapy for high risk stage IIB moderately differentiated adenocarcinoma of the sigmoid colon, MMR-P/DAO, I made a decision with the patient to proceed with adjuvant capecitabine 2000 mg/m²/day in 2 divided doses daily 1 week on 1 week of for 3 to 6 months (single agent capecitabine).  The patient will not receive adjuvant oxaliplatin.  Benefits of adjuvant capecitabine for high risk stage IIB adenocarcinoma of the sigmoid colon include prolongation of overall survival, prolongation of relapse free survival.  Patient has a clear understanding of potential adverse events that may be associated with capecitabine including hand-foot syndrome.  Benefits of adjuvant capecitabine outweigh any potential treatment related toxicity.  The patient will have reversal of colostomy on December 31, 2024.  Subsequently, he will initiate adjuvant single  agent capecitabine the third week of January 2025. He will return to medical oncology clinic for history and physical exam and to assess safety and tolerability of capecitabine in late January 2025.    Plan:  Colostomy reversal on December 31, 2024  Adjuvant capecitabine 2000 mg/m²/day in 2 divided doses, 1 week on, 1 week off, for total duration of adjuvant chemotherapy of 6 months.  Initial date of adjuvant chemotherapy will be the third week of January 2025  Return to medical oncology clinic for history and physical exam and to assess tolerability of capecitabine in late January 2025    Patient understands and agrees with my management recommendations and plan of care. I answered questions to his satisfaction.    Orders:    CEA; Future    CBC and differential; Future    Comprehensive metabolic panel; Future        History of Present Illness   Chief Complaint   Patient presents with    Follow-up   Darin Bullock is a 54 y.o. 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  Initial pathologic diagnosis of moderately differentiated adenocarcinoma of the sigmoid colon with established at the time of colonoscopy and biopsy of obstructing mass involving the sigmoid colon on October 24, 2024.  The patient underwent R0 laparoscopic hand-assisted extended left hemicolectomy with mobilization of the splenic flexure of the colon and creation of a descending colostomy on October 29, 2024.      In summary, Mr. Pena presented to the emergency department with acute colonic obstruction secondary to a mass present in the left sigmoid colon the third week of October 2024.  On October 21, 2024, contrast-enhanced CT scan of the abdomen and pelvis showed distended fluid-filled loops of  small bowel.  On October 23, 2024, non contrast CT scan of the chest showed a partially imaged colonic obstruction in the upper abdomen. There were no findings of metastatic disease in the chest. 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 The same day, serum CEA was 13.4 ng/mL. On October 24, 2024, colonoscopy showed a single malignant-appearing, fungating, invasive and ulcerated mass (not traversable) in the sigmoid colon, covering the whole circumference; performed cold forceps biopsy. There was a malignant-appearing stricture (not traversable) in the sigmoid colon. Pathology showed intramucosal adenocarcinoma arising in a tubular adenoma with extensive high grade dysplasia.  On October 24, 2024, the patient underwent placement of a sigmoid colonic metallic stent for treatment of colonic obstruction.  On October 29, 2024, the patient underwent R0 laparoscopic hand-assisted extended left hemicolectomy with mobilization of the splenic flexure, and creation of a descending colostomy. Pathology showed moderately differentiated adenocarcinoma (6.8 cm), arising in a tubular adenoma. Tumor invades the visceral peritoneum (pT4). Lymphovascular and perineural invasion are present. All margins are negative for tumor. Twenty one lymph nodes, negative for malignancy (0/21), pN0. The primary tumor is MMR-P consistent with a DAO colon cancer.     Interim history: The patient continues to recover very well after his recent surgery for adenocarcinoma of the sigmoid colon. He feels well.  He is recovering well after his laparoscopic hand-assisted extended left hemicolectomy, with mobilization of the colonic splenic flexure and creation of a descending colostomy.  After his colonic surgery, he has not noticed new systemic, cardiorespiratory, gastrointestinal, genitourinary, dermatological, musculoskeletal, neurologic symptoms.  On December 31, 2024, he will undergo colostomy  reversal.    Oncology History   Oncology History   Adenocarcinoma of sigmoid colon (HCC)   11/18/2024 Initial Diagnosis    Adenocarcinoma of sigmoid colon (HCC)     11/18/2024 -  Cancer Staged    Staging form: Colon and Rectum, AJCC 8th Edition  - Pathologic stage from 11/18/2024: Stage IIB (pT4a, pN0, cM0) - Signed by Sarah Aguilar MD on 11/18/2024  Stage prefix: Initial diagnosis  Total positive nodes: 0  Total nodes examined: 21  Histologic grading system: 4 grade system  Histologic grade (G): G2  Residual tumor (R): R0 - None  Laterality: Left  Tumor size (mm): 68  Lymph-vascular invasion (LVI): LVI present/identified, NOS  Diagnostic confirmation: Positive histology  Specimen type: Excision  Staged by: Managing physician  Carcinoembryonic antigen (CEA) (ng/mL): 13.4  Perineural invasion (PNI): Present  Microsatellite instability (MSI): Stable  Stage used in treatment planning: Yes  National guidelines used in treatment planning: Yes          Review of Systems   Constitutional:  Negative for activity change, appetite change, chills, diaphoresis, fatigue, fever and unexpected weight change.   HENT:  Negative for congestion, dental problem, ear discharge, ear pain, facial swelling, hearing loss, mouth sores, nosebleeds, postnasal drip, rhinorrhea, sinus pain, sneezing, sore throat, tinnitus, trouble swallowing and voice change.    Eyes:  Negative for photophobia, pain, discharge, redness, itching and visual disturbance.   Respiratory:  Negative for apnea, cough, choking, chest tightness, shortness of breath, wheezing and stridor.    Cardiovascular:  Negative for chest pain, palpitations and leg swelling.   Gastrointestinal:  Negative for abdominal distention, abdominal pain, anal bleeding, blood in stool, constipation, diarrhea, nausea, rectal pain and vomiting.   Endocrine: Negative for cold intolerance and heat intolerance.   Genitourinary:  Negative for decreased urine volume, difficulty urinating, dysuria,  "enuresis, flank pain, frequency, hematuria and urgency.   Musculoskeletal:  Negative for arthralgias, back pain, gait problem, joint swelling, myalgias, neck pain and neck stiffness.   Skin:  Negative for color change, pallor, rash and wound.   Neurological:  Negative for dizziness, tremors, seizures, syncope, facial asymmetry, speech difficulty, weakness, light-headedness, numbness and headaches.   Hematological:  Negative for adenopathy. Does not bruise/bleed easily.   Psychiatric/Behavioral:  Negative for behavioral problems, confusion, dysphoric mood and sleep disturbance. The patient is not nervous/anxious.          Objective   /82 (BP Location: Left arm, Patient Position: Sitting, Cuff Size: Adult)   Pulse 89   Temp (!) 97.2 °F (36.2 °C) (Temporal)   Resp 18   Ht 5' 10\" (1.778 m)   Wt 82.1 kg (181 lb)   SpO2 98%   BMI 25.97 kg/m²     ECOG  0  Physical Exam  Constitutional:       Comments:  male well-developed, well-nourished without acute respiratory distress   HENT:      Head: Normocephalic and atraumatic.      Nose: Nose normal.      Mouth/Throat:      Mouth: Mucous membranes are moist.      Pharynx: Oropharynx is clear.   Eyes:      Extraocular Movements: Extraocular movements intact.      Conjunctiva/sclera: Conjunctivae normal.      Pupils: Pupils are equal, round, and reactive to light.   Neck:      Comments: No cervical, supraclavicular, axillary, epitrochlear, or inguinal lymphadenopathy  Cardiovascular:      Rate and Rhythm: Normal rate and regular rhythm.      Pulses: Normal pulses.      Heart sounds: Normal heart sounds.   Pulmonary:      Effort: Pulmonary effort is normal.      Breath sounds: Normal breath sounds.   Abdominal:      General: Bowel sounds are normal.      Palpations: Abdomen is soft.      Comments: Colostomy in place.  No hepatosplenomegaly.  No palpable masses.  No lateral or shifting dullness.  No abdominal distention.  No rebound tenderness or guarding.  " Bowel sounds present normal.   Musculoskeletal:         General: Normal range of motion.      Cervical back: Normal range of motion and neck supple.   Skin:     General: Skin is warm and dry.      Capillary Refill: Capillary refill takes less than 2 seconds.   Neurological:      General: No focal deficit present.      Mental Status: He is alert and oriented to person, place, and time. Mental status is at baseline.   Psychiatric:         Mood and Affect: Mood normal.         Behavior: Behavior normal.         Thought Content: Thought content normal.         Judgment: Judgment normal.     Labs: I have reviewed the following labs:  Lab Results   Component Value Date/Time    WBC 6.83 10/31/2024 05:56 AM    RBC 4.01 10/31/2024 05:56 AM    Hemoglobin 12.5 10/31/2024 05:56 AM    Hematocrit 38.2 10/31/2024 05:56 AM    MCV 95 10/31/2024 05:56 AM    MCH 31.2 10/31/2024 05:56 AM    RDW 12.1 10/31/2024 05:56 AM    Platelets 292 10/31/2024 05:56 AM    Segmented % 66 10/31/2024 05:56 AM    Lymphocytes % 17 10/31/2024 05:56 AM    Monocytes % 16 (H) 10/31/2024 05:56 AM    Eosinophils Relative 1 10/31/2024 05:56 AM    Basophils Relative 0 10/31/2024 05:56 AM    Immature Grans % 0 10/31/2024 05:56 AM    Absolute Neutrophils 4.44 10/31/2024 05:56 AM     Lab Results   Component Value Date/Time    Potassium 4.0 10/31/2024 05:56 AM    Chloride 104 10/31/2024 05:56 AM    CO2 28 10/31/2024 05:56 AM    BUN 10 10/31/2024 05:56 AM    Creatinine 1.00 10/31/2024 05:56 AM    Calcium 8.3 (L) 10/31/2024 05:56 AM    AST 17 10/21/2024 04:10 PM    ALT 13 10/21/2024 04:10 PM    Alkaline Phosphatase 64 10/21/2024 04:10 PM    Total Protein 8.4 10/21/2024 04:10 PM    Albumin 4.9 10/21/2024 04:10 PM    Total Bilirubin 0.90 10/21/2024 04:10 PM    eGFR 83 10/31/2024 05:56 AM       Component      Latest Ref Rng 10/23/2024   CARCINOEMBRYONIC ANTIGEN      0.0 - 3.0 ng/mL 13.4 (H)

## 2024-12-12 NOTE — LETTER
2024     EVERTON Llanos MD  [address]  [address]  [place] [address]    Patient: Darin Bullock   YOB: 1970   Date of Visit: 2024       Dear Dr. Llanos:    Thank you for referring Darin Bullock to me for evaluation. Below are my notes for this consultation.    If you have questions, please do not hesitate to call me. I look forward to following your patient along with you.         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  Initial pathologic diagnosis of moderately differentiated adenocarcinoma of the sigmoid colon with established at the time of colonoscopy and biopsy of obstructing mass involving the sigmoid colon on 2024.  The patient underwent R0 laparoscopic hand-assisted extended left hemicolectomy with mobilization of the splenic flexure of the colon and creation of a descending colostomy on 2024.  The patient has multiple pathologic and clinical parameters consistent with high risk pathologic stage IIB moderately differentiated adenocarcinoma of the sigmoid colon including:     Presentation with colonic obstruction  Mismatch repair proficient tumor (MMR-P) /microsatellite stable adenocarcinoma of the sigmoid colon  Presence of lymphovascular invasion and the primary tumor  Presence of perineural invasion in the  primary tumor  T4a disease     I presented in detail, clinical information about the manifestations, diagnostic workup, staging, management, and prognosis of high risk stage IIB invasive moderately differentiated adenocarcinoma of the sigmoid colon, MMR-P primary tumor, consistent with microsatellite stable colon cancer.  Patient is at high risk for presence of micrometastatic disease and progression into gross metastases, particularly within the first 2 to 3 years after surgical resection of the primary sigmoid colon cancer.  I discussed with the patient benefits and risks of adjuvant systemic therapy options for stage IIB, high risk adenocarcinoma of the sigmoid colon including:     Fluoropyrimidine chemotherapy plus oxaliplatin including regimen such as modified infusional 5-fluorouracil, bolus 5-fluorouracil, leucovorin, and oxaliplatin (modified FOLFOX6)  Capecitabine plus oxaliplatin (CapeOx).  Single agent capecitabine   Single agent infusional 5-fluorouracil      Benefits of adjuvant chemotherapy include prolongation of overall survival, prolongation of relapse free survival, and delay in the development of gross metastatic disease.  I described to the patient potential toxicity associated with fluoropyrimidine's and oxaliplatin.  Benefits of adjuvant chemotherapy outweigh risks.  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      Interim assessment: After careful consideration of different options for adjuvant chemotherapy for high risk stage IIB moderately differentiated adenocarcinoma of the sigmoid colon, MMR-P/DAO, I made a decision with the patient to proceed with adjuvant capecitabine 2000 mg/m²/day in 2 divided doses daily 1 week on 1 week of for 3 to 6 months (single agent capecitabine).  The patient will not receive  adjuvant oxaliplatin.  Benefits of adjuvant capecitabine for high risk stage IIB adenocarcinoma of the sigmoid colon include prolongation of overall survival, prolongation of relapse free survival.  Patient has a clear understanding of potential adverse events that may be associated with capecitabine including hand-foot syndrome.  Benefits of adjuvant capecitabine outweigh any potential treatment related toxicity.  The patient will have reversal of colostomy on December 31, 2024.  Subsequently, he will initiate adjuvant single agent capecitabine the third week of January 2025. He will return to medical oncology clinic for history and physical exam and to assess safety and tolerability of capecitabine in late January 2025.    Plan:  Colostomy reversal on December 31, 2024  Adjuvant capecitabine 2000 mg/m²/day in 2 divided doses, 1 week on, 1 week off, for total duration of adjuvant chemotherapy of 6 months.  Initial date of adjuvant chemotherapy will be the third week of January 2025  Return to medical oncology clinic for history and physical exam and to assess tolerability of capecitabine in late January 2025    Patient understands and agrees with my management recommendations and plan of care. I answered questions to his satisfaction.    Orders:  •  CEA; Future  •  CBC and differential; Future  •  Comprehensive metabolic panel; Future        History of Present Illness   Chief Complaint   Patient presents with   • Follow-up   Darin Bullock is a 54 y.o. 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  Initial pathologic diagnosis of moderately differentiated adenocarcinoma of the sigmoid colon with established at the time of colonoscopy and biopsy of obstructing mass  involving the sigmoid colon on October 24, 2024.  The patient underwent R0 laparoscopic hand-assisted extended left hemicolectomy with mobilization of the splenic flexure of the colon and creation of a descending colostomy on October 29, 2024.      In summary, Mr. Pena presented to the emergency department with acute colonic obstruction secondary to a mass present in the left sigmoid colon the third week of October 2024.  On October 21, 2024, contrast-enhanced CT scan of the abdomen and pelvis showed distended fluid-filled loops of small bowel.  On October 23, 2024, non contrast CT scan of the chest showed a partially imaged colonic obstruction in the upper abdomen. There were no findings of metastatic disease in the chest. 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 The same day, serum CEA was 13.4 ng/mL. On October 24, 2024, colonoscopy showed a single malignant-appearing, fungating, invasive and ulcerated mass (not traversable) in the sigmoid colon, covering the whole circumference; performed cold forceps biopsy. There was a malignant-appearing stricture (not traversable) in the sigmoid colon. Pathology showed intramucosal adenocarcinoma arising in a tubular adenoma with extensive high grade dysplasia.  On October 24, 2024, the patient underwent placement of a sigmoid colonic metallic stent for treatment of colonic obstruction.  On October 29, 2024, the patient underwent R0 laparoscopic hand-assisted extended left hemicolectomy with mobilization of the splenic flexure, and creation of a descending colostomy. Pathology showed moderately differentiated adenocarcinoma (6.8 cm), arising in a tubular adenoma. Tumor invades the visceral peritoneum (pT4). Lymphovascular and perineural invasion are present. All margins are negative for tumor. Twenty one lymph nodes, negative for malignancy (0/21), pN0. The primary tumor is MMR-P consistent with a DAO colon cancer.     Interim  history: The patient continues to recover very well after his recent surgery for adenocarcinoma of the sigmoid colon. He feels well.  He is recovering well after his laparoscopic hand-assisted extended left hemicolectomy, with mobilization of the colonic splenic flexure and creation of a descending colostomy.  After his colonic surgery, he has not noticed new systemic, cardiorespiratory, gastrointestinal, genitourinary, dermatological, musculoskeletal, neurologic symptoms.  On December 31, 2024, he will undergo colostomy reversal.    Oncology History  Oncology History   Adenocarcinoma of sigmoid colon (HCC)   11/18/2024 Initial Diagnosis    Adenocarcinoma of sigmoid colon (HCC)     11/18/2024 -  Cancer Staged    Staging form: Colon and Rectum, AJCC 8th Edition  - Pathologic stage from 11/18/2024: Stage IIB (pT4a, pN0, cM0) - Signed by Sarah Aguilar MD on 11/18/2024  Stage prefix: Initial diagnosis  Total positive nodes: 0  Total nodes examined: 21  Histologic grading system: 4 grade system  Histologic grade (G): G2  Residual tumor (R): R0 - None  Laterality: Left  Tumor size (mm): 68  Lymph-vascular invasion (LVI): LVI present/identified, NOS  Diagnostic confirmation: Positive histology  Specimen type: Excision  Staged by: Managing physician  Carcinoembryonic antigen (CEA) (ng/mL): 13.4  Perineural invasion (PNI): Present  Microsatellite instability (MSI): Stable  Stage used in treatment planning: Yes  National guidelines used in treatment planning: Yes         Review of Systems   Constitutional:  Negative for activity change, appetite change, chills, diaphoresis, fatigue, fever and unexpected weight change.   HENT:  Negative for congestion, dental problem, ear discharge, ear pain, facial swelling, hearing loss, mouth sores, nosebleeds, postnasal drip, rhinorrhea, sinus pain, sneezing, sore throat, tinnitus, trouble swallowing and voice change.    Eyes:  Negative for photophobia, pain, discharge, redness, itching  "and visual disturbance.   Respiratory:  Negative for apnea, cough, choking, chest tightness, shortness of breath, wheezing and stridor.    Cardiovascular:  Negative for chest pain, palpitations and leg swelling.   Gastrointestinal:  Negative for abdominal distention, abdominal pain, anal bleeding, blood in stool, constipation, diarrhea, nausea, rectal pain and vomiting.   Endocrine: Negative for cold intolerance and heat intolerance.   Genitourinary:  Negative for decreased urine volume, difficulty urinating, dysuria, enuresis, flank pain, frequency, hematuria and urgency.   Musculoskeletal:  Negative for arthralgias, back pain, gait problem, joint swelling, myalgias, neck pain and neck stiffness.   Skin:  Negative for color change, pallor, rash and wound.   Neurological:  Negative for dizziness, tremors, seizures, syncope, facial asymmetry, speech difficulty, weakness, light-headedness, numbness and headaches.   Hematological:  Negative for adenopathy. Does not bruise/bleed easily.   Psychiatric/Behavioral:  Negative for behavioral problems, confusion, dysphoric mood and sleep disturbance. The patient is not nervous/anxious.          Objective   /82 (BP Location: Left arm, Patient Position: Sitting, Cuff Size: Adult)   Pulse 89   Temp (!) 97.2 °F (36.2 °C) (Temporal)   Resp 18   Ht 5' 10\" (1.778 m)   Wt 82.1 kg (181 lb)   SpO2 98%   BMI 25.97 kg/m²     ECOG  0  Physical Exam  Constitutional:       Comments:  male well-developed, well-nourished without acute respiratory distress   HENT:      Head: Normocephalic and atraumatic.      Nose: Nose normal.      Mouth/Throat:      Mouth: Mucous membranes are moist.      Pharynx: Oropharynx is clear.   Eyes:      Extraocular Movements: Extraocular movements intact.      Conjunctiva/sclera: Conjunctivae normal.      Pupils: Pupils are equal, round, and reactive to light.   Neck:      Comments: No cervical, supraclavicular, axillary, epitrochlear, or " inguinal lymphadenopathy  Cardiovascular:      Rate and Rhythm: Normal rate and regular rhythm.      Pulses: Normal pulses.      Heart sounds: Normal heart sounds.   Pulmonary:      Effort: Pulmonary effort is normal.      Breath sounds: Normal breath sounds.   Abdominal:      General: Bowel sounds are normal.      Palpations: Abdomen is soft.      Comments: Colostomy in place.  No hepatosplenomegaly.  No palpable masses.  No lateral or shifting dullness.  No abdominal distention.  No rebound tenderness or guarding.  Bowel sounds present normal.   Musculoskeletal:         General: Normal range of motion.      Cervical back: Normal range of motion and neck supple.   Skin:     General: Skin is warm and dry.      Capillary Refill: Capillary refill takes less than 2 seconds.   Neurological:      General: No focal deficit present.      Mental Status: He is alert and oriented to person, place, and time. Mental status is at baseline.   Psychiatric:         Mood and Affect: Mood normal.         Behavior: Behavior normal.         Thought Content: Thought content normal.         Judgment: Judgment normal.     Labs: I have reviewed the following labs:  Lab Results   Component Value Date/Time    WBC 6.83 10/31/2024 05:56 AM    RBC 4.01 10/31/2024 05:56 AM    Hemoglobin 12.5 10/31/2024 05:56 AM    Hematocrit 38.2 10/31/2024 05:56 AM    MCV 95 10/31/2024 05:56 AM    MCH 31.2 10/31/2024 05:56 AM    RDW 12.1 10/31/2024 05:56 AM    Platelets 292 10/31/2024 05:56 AM    Segmented % 66 10/31/2024 05:56 AM    Lymphocytes % 17 10/31/2024 05:56 AM    Monocytes % 16 (H) 10/31/2024 05:56 AM    Eosinophils Relative 1 10/31/2024 05:56 AM    Basophils Relative 0 10/31/2024 05:56 AM    Immature Grans % 0 10/31/2024 05:56 AM    Absolute Neutrophils 4.44 10/31/2024 05:56 AM     Lab Results   Component Value Date/Time    Potassium 4.0 10/31/2024 05:56 AM    Chloride 104 10/31/2024 05:56 AM    CO2 28 10/31/2024 05:56 AM    BUN 10 10/31/2024 05:56  AM    Creatinine 1.00 10/31/2024 05:56 AM    Calcium 8.3 (L) 10/31/2024 05:56 AM    AST 17 10/21/2024 04:10 PM    ALT 13 10/21/2024 04:10 PM    Alkaline Phosphatase 64 10/21/2024 04:10 PM    Total Protein 8.4 10/21/2024 04:10 PM    Albumin 4.9 10/21/2024 04:10 PM    Total Bilirubin 0.90 10/21/2024 04:10 PM    eGFR 83 10/31/2024 05:56 AM       Component      Latest Ref Rng 10/23/2024   CARCINOEMBRYONIC ANTIGEN      0.0 - 3.0 ng/mL 13.4 (H)               Sarah Aguilar MD  2024 11:24 AM  Sign when Signing Visit  Name: Darin Bullock      : 1970      MRN: [id number]  Encounter Provider: Sarah Aguilar MD  Encounter Date: 2024   Encounter department: [facility]  :  Assessment & Plan  Adenocarcinoma of sigmoid colon (HCC)  54-year-old male with new diagnosis of pathologic T4, pathologic N0 (0 of 21 lymph nodes) clinical M0, high risk stage IIB invasive moderately differentiated adenocarcinoma of the sigmoid colon, mismatch repair protein proficient (MMR-P), consistent with microsatellite stable colon cancer, presenting with colonic obstruction.  Initial pathologic diagnosis of moderately differentiated adenocarcinoma of the sigmoid colon with established at the time of colonoscopy and biopsy of obstructing mass involving the sigmoid colon on 2024.  The patient underwent R0 laparoscopic hand-assisted extended left hemicolectomy with mobilization of the splenic flexure of the colon and creation of a descending colostomy on 2024.  The patient has multiple pathologic and clinical parameters consistent with high risk pathologic stage IIB moderately differentiated adenocarcinoma of the sigmoid colon including:     Presentation with colonic obstruction  Mismatch repair proficient tumor (MMR-P) /microsatellite stable adenocarcinoma of the sigmoid colon  Presence of lymphovascular invasion and the primary tumor  Presence of perineural invasion in  the primary tumor  T4a disease     Today, I presented in detail clinical information about the manifestations, diagnostic workup, staging, management, and prognosis of high risk stage IIB invasive moderately differentiated adenocarcinoma of the sigmoid colon, MMR-P primary tumor, consistent with microsatellite stable colon cancer.  Patient is at high risk for presence of micrometastatic disease and progression into gross metastases, particularly within the first 2 to 3 years after surgical resection of the primary sigmoid colon cancer.  I discussed with the patient benefits and risks of adjuvant systemic therapy options for stage IIB, high risk adenocarcinoma of the sigmoid colon including:     Fluoropyrimidine chemotherapy plus oxaliplatin including regimen such as modified infusional 5-fluorouracil, bolus 5-fluorouracil, leucovorin, and oxaliplatin (modified FOLFOX6)  Capecitabine plus oxaliplatin (CapeOx).  Single agent capecitabine   Single agent infusional 5-fluorouracil      Benefits of adjuvant chemotherapy include prolongation of overall survival, prolongation of relapse free survival, and delay in the development of gross metastatic disease.  I described to the patient potential toxicity associated with fluoropyrimidine's and oxaliplatin.  Benefits of adjuvant chemotherapy outweigh risks.  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      Interim assessment: After careful consideration of different options for adjuvant chemotherapy for high risk stage IIB moderately differentiated adenocarcinoma of the sigmoid colon, MMR-P/DAO, I made a decision with the patient to proceed with adjuvant capecitabine 2000 mg/m²/day in 2 divided doses daily 1 week on 1 week of for 3 to 6 months.  The patient will not receive adjuvant  oxaliplatin.  Benefits of adjuvant capecitabine for high risk stage IIB adenocarcinoma of the sigmoid colon include prolongation of overall survival, prolongation of relapse free survival.  Patient has a clear understanding of potential adverse events that may be associated with capecitabine including hand-foot syndrome.  Benefits of adjuvant capecitabine outweigh any potential treatment related toxicity.  The patient will have reversal of colostomy on December 31, 2024.  Subsequently, he will initiate adjuvant single agent capecitabine the third week of January 2025. He will return to medical oncology clinic for history and physical exam and to assess safety and tolerability of capecitabine in late January 2025.    Plan:  Colostomy reversal on December 31, 2024  Adjuvant capecitabine 2000 mg/m²/day in 2 divided doses, 1 week on, 1 week off, for total duration of adjuvant chemotherapy of 6 months.  Initial date of adjuvant chemotherapy will be the third week of January 2025  Return to medical oncology clinic for history and physical exam and to assess tolerability of capecitabine in late January 2025    Patient understands and agrees with my management recommendations and plan of care. I answered questions to his satisfaction.    Orders:  •  CEA; Future  •  CBC and differential; Future  •  Comprehensive metabolic panel; Future        History of Present Illness   Chief Complaint   Patient presents with   • Follow-up   Darin Bullock is a 54 y.o. 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  Initial pathologic diagnosis of moderately differentiated adenocarcinoma of the sigmoid colon with established at the time of colonoscopy and biopsy of obstructing mass involving  the sigmoid colon on October 24, 2024.  The patient underwent R0 laparoscopic hand-assisted extended left hemicolectomy with mobilization of the splenic flexure of the colon and creation of a descending colostomy on October 29, 2024.      In summary, Mr. Pena presented to the emergency department with acute colonic obstruction secondary to a mass present in the left sigmoid colon the third week of October 2024.  On October 21, 2024, contrast-enhanced CT scan of the abdomen and pelvis showed distended fluid-filled loops of small bowel.  On October 23, 2024, non contrast CT scan of the chest showed a partially imaged colonic obstruction in the upper abdomen. There were no findings of metastatic disease in the chest. 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 The same day, serum CEA was 13.4 ng/mL. On October 24, 2024, colonoscopy showed a single malignant-appearing, fungating, invasive and ulcerated mass (not traversable) in the sigmoid colon, covering the whole circumference; performed cold forceps biopsy. There was a malignant-appearing stricture (not traversable) in the sigmoid colon. Pathology showed intramucosal adenocarcinoma arising in a tubular adenoma with extensive high grade dysplasia.  On October 24, 2024, the patient underwent placement of a sigmoid colonic metallic stent for treatment of colonic obstruction.  On October 29, 2024, the patient underwent R0 laparoscopic hand-assisted extended left hemicolectomy with mobilization of the splenic flexure, and creation of a descending colostomy. Pathology showed moderately differentiated adenocarcinoma (6.8 cm), arising in a tubular adenoma. Tumor invades the visceral peritoneum (pT4). Lymphovascular and perineural invasion are present. All margins are negative for tumor. Twenty one lymph nodes, negative for malignancy (0/21), pN0. The primary tumor is MMR-P consistent with a DAO colon cancer.     Interim history: The  patient continues to recover very well after his recent surgery for adenocarcinoma of the sigmoid colon. He feels well.  He is recovering well after his laparoscopic hand-assisted extended left hemicolectomy, with mobilization of the colonic splenic flexure and creation of a descending colostomy.  After his colonic surgery, he has not noticed new systemic, cardiorespiratory, gastrointestinal, genitourinary, dermatological, musculoskeletal, neurologic symptoms.  On December 31, 2024, he will undergo colostomy reversal.    Oncology History  Oncology History   Adenocarcinoma of sigmoid colon (HCC)   11/18/2024 Initial Diagnosis    Adenocarcinoma of sigmoid colon (HCC)     11/18/2024 -  Cancer Staged    Staging form: Colon and Rectum, AJCC 8th Edition  - Pathologic stage from 11/18/2024: Stage IIB (pT4a, pN0, cM0) - Signed by Sarah Aguilar MD on 11/18/2024  Stage prefix: Initial diagnosis  Total positive nodes: 0  Total nodes examined: 21  Histologic grading system: 4 grade system  Histologic grade (G): G2  Residual tumor (R): R0 - None  Laterality: Left  Tumor size (mm): 68  Lymph-vascular invasion (LVI): LVI present/identified, NOS  Diagnostic confirmation: Positive histology  Specimen type: Excision  Staged by: Managing physician  Carcinoembryonic antigen (CEA) (ng/mL): 13.4  Perineural invasion (PNI): Present  Microsatellite instability (MSI): Stable  Stage used in treatment planning: Yes  National guidelines used in treatment planning: Yes         Review of Systems   Constitutional:  Negative for activity change, appetite change, chills, diaphoresis, fatigue, fever and unexpected weight change.   HENT:  Negative for congestion, dental problem, ear discharge, ear pain, facial swelling, hearing loss, mouth sores, nosebleeds, postnasal drip, rhinorrhea, sinus pain, sneezing, sore throat, tinnitus, trouble swallowing and voice change.    Eyes:  Negative for photophobia, pain, discharge, redness, itching and visual  "disturbance.   Respiratory:  Negative for apnea, cough, choking, chest tightness, shortness of breath, wheezing and stridor.    Cardiovascular:  Negative for chest pain, palpitations and leg swelling.   Gastrointestinal:  Negative for abdominal distention, abdominal pain, anal bleeding, blood in stool, constipation, diarrhea, nausea, rectal pain and vomiting.   Endocrine: Negative for cold intolerance and heat intolerance.   Genitourinary:  Negative for decreased urine volume, difficulty urinating, dysuria, enuresis, flank pain, frequency, hematuria and urgency.   Musculoskeletal:  Negative for arthralgias, back pain, gait problem, joint swelling, myalgias, neck pain and neck stiffness.   Skin:  Negative for color change, pallor, rash and wound.   Neurological:  Negative for dizziness, tremors, seizures, syncope, facial asymmetry, speech difficulty, weakness, light-headedness, numbness and headaches.   Hematological:  Negative for adenopathy. Does not bruise/bleed easily.   Psychiatric/Behavioral:  Negative for behavioral problems, confusion, dysphoric mood and sleep disturbance. The patient is not nervous/anxious.          Objective   /82 (BP Location: Left arm, Patient Position: Sitting, Cuff Size: Adult)   Pulse 89   Temp (!) 97.2 °F (36.2 °C) (Temporal)   Resp 18   Ht 5' 10\" (1.778 m)   Wt 82.1 kg (181 lb)   SpO2 98%   BMI 25.97 kg/m²     ECOG  0  Physical Exam  Constitutional:       Comments:  male well-developed, well-nourished without acute respiratory distress   HENT:      Head: Normocephalic and atraumatic.      Nose: Nose normal.      Mouth/Throat:      Mouth: Mucous membranes are moist.      Pharynx: Oropharynx is clear.   Eyes:      Extraocular Movements: Extraocular movements intact.      Conjunctiva/sclera: Conjunctivae normal.      Pupils: Pupils are equal, round, and reactive to light.   Neck:      Comments: No cervical, supraclavicular, axillary, epitrochlear, or inguinal " lymphadenopathy  Cardiovascular:      Rate and Rhythm: Normal rate and regular rhythm.      Pulses: Normal pulses.      Heart sounds: Normal heart sounds.   Pulmonary:      Effort: Pulmonary effort is normal.      Breath sounds: Normal breath sounds.   Abdominal:      General: Bowel sounds are normal.      Palpations: Abdomen is soft.      Comments: Colostomy in place.  No hepatosplenomegaly.  No palpable masses.  No lateral or shifting dullness.  No abdominal distention.  No rebound tenderness or guarding.  Bowel sounds present normal.   Musculoskeletal:         General: Normal range of motion.      Cervical back: Normal range of motion and neck supple.   Skin:     General: Skin is warm and dry.      Capillary Refill: Capillary refill takes less than 2 seconds.   Neurological:      General: No focal deficit present.      Mental Status: He is alert and oriented to person, place, and time. Mental status is at baseline.   Psychiatric:         Mood and Affect: Mood normal.         Behavior: Behavior normal.         Thought Content: Thought content normal.         Judgment: Judgment normal.     Labs: I have reviewed the following labs:  Lab Results   Component Value Date/Time    WBC 6.83 10/31/2024 05:56 AM    RBC 4.01 10/31/2024 05:56 AM    Hemoglobin 12.5 10/31/2024 05:56 AM    Hematocrit 38.2 10/31/2024 05:56 AM    MCV 95 10/31/2024 05:56 AM    MCH 31.2 10/31/2024 05:56 AM    RDW 12.1 10/31/2024 05:56 AM    Platelets 292 10/31/2024 05:56 AM    Segmented % 66 10/31/2024 05:56 AM    Lymphocytes % 17 10/31/2024 05:56 AM    Monocytes % 16 (H) 10/31/2024 05:56 AM    Eosinophils Relative 1 10/31/2024 05:56 AM    Basophils Relative 0 10/31/2024 05:56 AM    Immature Grans % 0 10/31/2024 05:56 AM    Absolute Neutrophils 4.44 10/31/2024 05:56 AM     Lab Results   Component Value Date/Time    Potassium 4.0 10/31/2024 05:56 AM    Chloride 104 10/31/2024 05:56 AM    CO2 28 10/31/2024 05:56 AM    BUN 10 10/31/2024 05:56 AM     Creatinine 1.00 10/31/2024 05:56 AM    Calcium 8.3 (L) 10/31/2024 05:56 AM    AST 17 10/21/2024 04:10 PM    ALT 13 10/21/2024 04:10 PM    Alkaline Phosphatase 64 10/21/2024 04:10 PM    Total Protein 8.4 10/21/2024 04:10 PM    Albumin 4.9 10/21/2024 04:10 PM    Total Bilirubin 0.90 10/21/2024 04:10 PM    eGFR 83 10/31/2024 05:56 AM       Component      Latest Ref Rng 10/23/2024   CARCINOEMBRYONIC ANTIGEN      0.0 - 3.0 ng/mL 13.4 (H)

## 2024-12-13 RX ORDER — NEOMYCIN SULFATE 500 MG/1
TABLET ORAL
Qty: 4 TABLET | Refills: 0 | Status: SHIPPED | OUTPATIENT
Start: 2024-12-13 | End: 2024-12-13

## 2024-12-13 RX ORDER — METRONIDAZOLE 500 MG/1
TABLET ORAL
Qty: 2 TABLET | Refills: 0 | Status: SHIPPED | OUTPATIENT
Start: 2024-12-13 | End: 2024-12-13

## 2024-12-20 ENCOUNTER — PATIENT OUTREACH (OUTPATIENT)
Dept: HEMATOLOGY ONCOLOGY | Facility: CLINIC | Age: 54
End: 2024-12-20

## 2024-12-20 NOTE — PROGRESS NOTES
I reached out and spoke with Darin  to follow up and to review for any changes in barriers to care and offer supportive services as needed.    Barriers noted previously; None .     Current barriers and interventions provided: emotional support, appointment confirmation. Pt stated he is feeling good, he walks 4 miles daily.  He is eating and drinking with no issues. He is aware of all upcoming appointments     Bases on individual needs I will follow up with them as needed. . I have provided my direct contact information and welcome them to contact me if their needs as discussed above change. They were appreciative for the call.

## 2024-12-21 ENCOUNTER — APPOINTMENT (OUTPATIENT)
Dept: LAB | Age: 54
End: 2024-12-21
Payer: COMMERCIAL

## 2024-12-21 DIAGNOSIS — C18.7 ADENOCARCINOMA OF SIGMOID COLON (HCC): ICD-10-CM

## 2024-12-21 LAB
ABO GROUP BLD: NORMAL
ALBUMIN SERPL BCG-MCNC: 4.4 G/DL (ref 3.5–5)
ALP SERPL-CCNC: 50 U/L (ref 34–104)
ALT SERPL W P-5'-P-CCNC: 19 U/L (ref 7–52)
ANION GAP SERPL CALCULATED.3IONS-SCNC: 11 MMOL/L (ref 4–13)
AST SERPL W P-5'-P-CCNC: 17 U/L (ref 13–39)
BASOPHILS # BLD AUTO: 0.04 THOUSANDS/ÂΜL (ref 0–0.1)
BASOPHILS NFR BLD AUTO: 1 % (ref 0–1)
BILIRUB SERPL-MCNC: 0.5 MG/DL (ref 0.2–1)
BLD GP AB SCN SERPL QL: NEGATIVE
BUN SERPL-MCNC: 14 MG/DL (ref 5–25)
CALCIUM SERPL-MCNC: 9.7 MG/DL (ref 8.4–10.2)
CHLORIDE SERPL-SCNC: 102 MMOL/L (ref 96–108)
CO2 SERPL-SCNC: 26 MMOL/L (ref 21–32)
CREAT SERPL-MCNC: 1.08 MG/DL (ref 0.6–1.3)
EOSINOPHIL # BLD AUTO: 0.2 THOUSAND/ÂΜL (ref 0–0.61)
EOSINOPHIL NFR BLD AUTO: 4 % (ref 0–6)
ERYTHROCYTE [DISTWIDTH] IN BLOOD BY AUTOMATED COUNT: 12.3 % (ref 11.6–15.1)
GFR SERPL CREATININE-BSD FRML MDRD: 76 ML/MIN/1.73SQ M
GLUCOSE P FAST SERPL-MCNC: 99 MG/DL (ref 65–99)
HCT VFR BLD AUTO: 44.2 % (ref 36.5–49.3)
HGB BLD-MCNC: 14.5 G/DL (ref 12–17)
IMM GRANULOCYTES # BLD AUTO: 0.01 THOUSAND/UL (ref 0–0.2)
IMM GRANULOCYTES NFR BLD AUTO: 0 % (ref 0–2)
LYMPHOCYTES # BLD AUTO: 1.52 THOUSANDS/ÂΜL (ref 0.6–4.47)
LYMPHOCYTES NFR BLD AUTO: 27 % (ref 14–44)
MCH RBC QN AUTO: 29.9 PG (ref 26.8–34.3)
MCHC RBC AUTO-ENTMCNC: 32.8 G/DL (ref 31.4–37.4)
MCV RBC AUTO: 91 FL (ref 82–98)
MONOCYTES # BLD AUTO: 0.67 THOUSAND/ÂΜL (ref 0.17–1.22)
MONOCYTES NFR BLD AUTO: 12 % (ref 4–12)
NEUTROPHILS # BLD AUTO: 3.23 THOUSANDS/ÂΜL (ref 1.85–7.62)
NEUTS SEG NFR BLD AUTO: 56 % (ref 43–75)
NRBC BLD AUTO-RTO: 0 /100 WBCS
PLATELET # BLD AUTO: 273 THOUSANDS/UL (ref 149–390)
PMV BLD AUTO: 9.6 FL (ref 8.9–12.7)
POTASSIUM SERPL-SCNC: 3.7 MMOL/L (ref 3.5–5.3)
PROT SERPL-MCNC: 7.2 G/DL (ref 6.4–8.4)
RBC # BLD AUTO: 4.85 MILLION/UL (ref 3.88–5.62)
RH BLD: POSITIVE
SODIUM SERPL-SCNC: 139 MMOL/L (ref 135–147)
SPECIMEN EXPIRATION DATE: NORMAL
WBC # BLD AUTO: 5.67 THOUSAND/UL (ref 4.31–10.16)

## 2024-12-21 PROCEDURE — 85025 COMPLETE CBC W/AUTO DIFF WBC: CPT

## 2024-12-21 PROCEDURE — 36415 COLL VENOUS BLD VENIPUNCTURE: CPT

## 2024-12-21 PROCEDURE — 86900 BLOOD TYPING SEROLOGIC ABO: CPT

## 2024-12-21 PROCEDURE — 86901 BLOOD TYPING SEROLOGIC RH(D): CPT

## 2024-12-21 PROCEDURE — 86850 RBC ANTIBODY SCREEN: CPT

## 2024-12-21 PROCEDURE — 80053 COMPREHEN METABOLIC PANEL: CPT

## 2024-12-23 ENCOUNTER — ANESTHESIA EVENT (OUTPATIENT)
Dept: PERIOP | Facility: HOSPITAL | Age: 54
DRG: 231 | End: 2024-12-23
Payer: COMMERCIAL

## 2024-12-27 RX ORDER — NEOMYCIN SULFATE 500 MG/1
TABLET ORAL
COMMUNITY
Start: 2024-12-13 | End: 2025-01-03

## 2024-12-27 RX ORDER — METRONIDAZOLE 500 MG/1
TABLET ORAL
COMMUNITY
Start: 2024-12-13 | End: 2025-01-03

## 2024-12-30 ENCOUNTER — HOSPITAL ENCOUNTER (OUTPATIENT)
Dept: GASTROENTEROLOGY | Facility: HOSPITAL | Age: 54
Setting detail: OUTPATIENT SURGERY
Discharge: HOME | DRG: 231 | End: 2024-12-30
Attending: COLON & RECTAL SURGERY
Payer: COMMERCIAL

## 2024-12-30 ENCOUNTER — ANESTHESIA (OUTPATIENT)
Dept: GASTROENTEROLOGY | Facility: HOSPITAL | Age: 54
DRG: 231 | End: 2024-12-30
Payer: COMMERCIAL

## 2024-12-30 ENCOUNTER — ANESTHESIA EVENT (OUTPATIENT)
Dept: GASTROENTEROLOGY | Facility: HOSPITAL | Age: 54
DRG: 231 | End: 2024-12-30
Payer: COMMERCIAL

## 2024-12-30 VITALS
OXYGEN SATURATION: 95 % | BODY MASS INDEX: 26.4 KG/M2 | DIASTOLIC BLOOD PRESSURE: 86 MMHG | RESPIRATION RATE: 20 BRPM | WEIGHT: 184 LBS | SYSTOLIC BLOOD PRESSURE: 115 MMHG | TEMPERATURE: 97.5 F | HEART RATE: 85 BPM

## 2024-12-30 DIAGNOSIS — C18.7 ADENOCARCINOMA OF SIGMOID COLON (HCC): ICD-10-CM

## 2024-12-30 PROCEDURE — 45385 COLONOSCOPY W/LESION REMOVAL: CPT | Performed by: COLON & RECTAL SURGERY

## 2024-12-30 PROCEDURE — 0DBH8ZX EXCISION OF CECUM, VIA NATURAL OR ARTIFICIAL OPENING ENDOSCOPIC, DIAGNOSTIC: ICD-10-PCS | Performed by: COLON & RECTAL SURGERY

## 2024-12-30 PROCEDURE — 0DBK8ZX EXCISION OF ASCENDING COLON, VIA NATURAL OR ARTIFICIAL OPENING ENDOSCOPIC, DIAGNOSTIC: ICD-10-PCS | Performed by: COLON & RECTAL SURGERY

## 2024-12-30 PROCEDURE — 0DBL8ZX EXCISION OF TRANSVERSE COLON, VIA NATURAL OR ARTIFICIAL OPENING ENDOSCOPIC, DIAGNOSTIC: ICD-10-PCS | Performed by: COLON & RECTAL SURGERY

## 2024-12-30 PROCEDURE — 88305 TISSUE EXAM BY PATHOLOGIST: CPT | Performed by: PATHOLOGY

## 2024-12-30 RX ORDER — SODIUM CHLORIDE 9 MG/ML
INJECTION, SOLUTION INTRAVENOUS CONTINUOUS PRN
Status: DISCONTINUED | OUTPATIENT
Start: 2024-12-30 | End: 2024-12-30

## 2024-12-30 RX ORDER — PROPOFOL 10 MG/ML
INJECTION, EMULSION INTRAVENOUS AS NEEDED
Status: DISCONTINUED | OUTPATIENT
Start: 2024-12-30 | End: 2024-12-30

## 2024-12-30 RX ADMIN — SODIUM CHLORIDE: 0.9 INJECTION, SOLUTION INTRAVENOUS at 11:32

## 2024-12-30 RX ADMIN — PROPOFOL 120 MCG/KG/MIN: 10 INJECTION, EMULSION INTRAVENOUS at 11:36

## 2024-12-30 RX ADMIN — PROPOFOL 100 MG: 10 INJECTION, EMULSION INTRAVENOUS at 11:35

## 2024-12-30 NOTE — PRE-PROCEDURE INSTRUCTIONS
Pre-Surgery Instructions:   Medication Instructions    acetaminophen (TYLENOL) 650 mg CR tablet Uses PRN- OK to take day of surgery- with sip of water     metroNIDAZOLE (FLAGYL) 500 mg tablet Instructions provided by MD- pt aware using 12/30 at 1pm and 10 pm     neomycin (MYCIFRADIN) 500 mg tablet Instructions provided by MD- pt aware of instructions using 12/20 at 1 pm and 10 pm    omeprazole (PriLOSEC) 40 MG capsule Take day of surgery. With sip of water     polyethylene glycol (MIRALAX) 17 g packet Hold dos.     Pt instructed on use of cleanser for DOS and instructions for showering both night before and DOS reviewed with pt - pt verbalized understanding of instructions given  Pt also instructed on no hair or body products on skin for DOS.  No shaving with a razor blade for 7 days prior to surgery to decrease any incident of infection - electric razor is ok to use up till 24 hrs prior to DOS.  Pt instructed that if with any changes in health status between now and DOS - notify surgeon office.  Tylenol is ok to use as needed up to and including DOS with sips of water - if needed - did instruct NO NSAIDS to be used at least 3 days prior to surgery - or if given a longer hold time of NSAIDS from MD please follow MD hold instructions.  Instructed to bring photo ID and medical insurance card for DOS as forms of identification for DOS.  Also instructed pt on no jewelry or body piercings, no valuables on body for DOS. Contact lenses, if worn - need to be removed for DOS.  Pt instructed does need transport home after surgery- pt is not allowed to drive.    Pt has pre surgery drinks x3 and instructions from office on use prior to surgery - pt aware.    Medication instructions for day surgery reviewed. Please use only a sip of water to take your instructed medications. Avoid all over the counter vitamins, supplements and NSAIDS for one week prior to surgery per anesthesia guidelines. Tylenol is ok to take as needed.      You will receive a call one business day prior to surgery with an arrival time and hospital directions. If your surgery is scheduled on a Monday, the hospital will be calling you on the Friday prior to your surgery. If you have not heard from anyone by 8pm, please call the hospital supervisor through the hospital  at [phone]. ([place] [phone] or [place] [phone]).    Do not eat or drink anything after midnight the night before your surgery, including candy, mints, lifesavers, or chewing gum. Do not drink alcohol 24hrs before your surgery. Try not to smoke at least 24hrs before your surgery.       Follow the pre surgery showering instructions as listed in the “My Surgical Experience Booklet” or otherwise provided by your surgeon's office. Do not use a blade to shave the surgical area 1 week before surgery. It is okay to use a clean electric clippers up to 24 hours before surgery. Do not apply any lotions, creams, including makeup, cologne, deodorant, or perfumes after showering on the day of your surgery. Do not use dry shampoo, hair spray, hair gel, or any type of hair products.     No contact lenses, eye make-up, or artificial eyelashes. Remove nail polish, including gel polish, and any artificial, gel, or acrylic nails if possible. Remove all jewelry including rings and body piercing jewelry.     Wear causal clothing that is easy to take on and off. Consider your type of surgery.    Keep any valuables, jewelry, piercings at home. Please bring any specially ordered equipment (sling, braces) if indicated.    Arrange for a responsible person to drive you to and from the hospital on the day of your surgery. Please confirm the visitor policy for the day of your procedure when you receive your phone call with an arrival time.     Call the surgeon's office with any new illnesses, exposures, or additional questions prior to surgery.    Please reference your “My Surgical Experience Booklet” for  additional information to prepare for your upcoming surgery.

## 2024-12-30 NOTE — ANESTHESIA PREPROCEDURE EVALUATION
Procedure:  COLONOSCOPY    Relevant Problems   GI/HEPATIC   (+) Adenocarcinoma of sigmoid colon (HCC)   (+) Bowel obstruction (HCC)   (+) GERD (gastroesophageal reflux disease)        Physical Exam    Airway    Mallampati score: III  TM Distance: >3 FB  Neck ROM: full     Dental   No notable dental hx     Cardiovascular  Cardiovascular exam normal    Pulmonary  Pulmonary exam normal     Other Findings        Anesthesia Plan  ASA Score- 3     Anesthesia Type- IV sedation with anesthesia with ASA Monitors.         Additional Monitors:     Airway Plan:            Plan Factors-Exercise tolerance (METS): >4 METS.    Chart reviewed. EKG reviewed.  Existing labs reviewed. Patient summary reviewed.    Patient is not a current smoker.              Induction- intravenous.    Postoperative Plan-         Informed Consent- Anesthetic plan and risks discussed with patient.  I personally reviewed this patient with the CRNA. Discussed and agreed on the Anesthesia Plan with the CRNA..

## 2024-12-30 NOTE — ANESTHESIA POSTPROCEDURE EVALUATION
Post-Op Assessment Note    CV Status:  Stable  Pain Score: 0    Pain management: adequate       Mental Status:  Alert and awake   Hydration Status:  Euvolemic   PONV Controlled:  Controlled   Airway Patency:  Patent  Two or more mitigation strategies used for obstructive sleep apnea   Post Op Vitals Reviewed: Yes    No anethesia notable event occurred.    Staff: CRNA           Last Filed PACU Vitals:  Vitals Value Taken Time   Temp     Pulse     BP     Resp     SpO2         Modified Doron:  Activity: 2 (12/30/2024 11:06 AM)  Respiration: 2 (12/30/2024 11:06 AM)  Circulation: 2 (12/30/2024 11:06 AM)  Consciousness: 2 (12/30/2024 11:06 AM)  Oxygen Saturation: 2 (12/30/2024 11:06 AM)  Modified Doron Score: 10 (12/30/2024 11:06 AM)

## 2024-12-31 ENCOUNTER — ANESTHESIA (OUTPATIENT)
Dept: PERIOP | Facility: HOSPITAL | Age: 54
DRG: 231 | End: 2024-12-31
Payer: COMMERCIAL

## 2024-12-31 ENCOUNTER — HOSPITAL ENCOUNTER (INPATIENT)
Facility: HOSPITAL | Age: 54
LOS: 3 days | Discharge: HOME | DRG: 231 | End: 2025-01-03
Attending: UROLOGY | Admitting: COLON & RECTAL SURGERY
Payer: COMMERCIAL

## 2024-12-31 DIAGNOSIS — C18.7 ADENOCARCINOMA OF SIGMOID COLON (HCC): ICD-10-CM

## 2024-12-31 LAB — GLUCOSE SERPL-MCNC: 160 MG/DL (ref 65–140)

## 2024-12-31 PROCEDURE — 44227 LAP CLOSE ENTEROSTOMY: CPT | Performed by: COLON & RECTAL SURGERY

## 2024-12-31 PROCEDURE — 52005 CYSTO W/URTRL CATHJ: CPT | Performed by: UROLOGY

## 2024-12-31 PROCEDURE — 88304 TISSUE EXAM BY PATHOLOGIST: CPT | Performed by: PATHOLOGY

## 2024-12-31 PROCEDURE — 0DBN0ZZ EXCISION OF SIGMOID COLON, OPEN APPROACH: ICD-10-PCS | Performed by: COLON & RECTAL SURGERY

## 2024-12-31 PROCEDURE — C1758 CATHETER, URETERAL: HCPCS | Performed by: UROLOGY

## 2024-12-31 PROCEDURE — 99024 POSTOP FOLLOW-UP VISIT: CPT | Performed by: COLON & RECTAL SURGERY

## 2024-12-31 PROCEDURE — 0T9880Z DRAINAGE OF BILATERAL URETERS WITH DRAINAGE DEVICE, VIA NATURAL OR ARTIFICIAL OPENING ENDOSCOPIC: ICD-10-PCS | Performed by: UROLOGY

## 2024-12-31 PROCEDURE — 82948 REAGENT STRIP/BLOOD GLUCOSE: CPT

## 2024-12-31 PROCEDURE — 0DJD8ZZ INSPECTION OF LOWER INTESTINAL TRACT, VIA NATURAL OR ARTIFICIAL OPENING ENDOSCOPIC: ICD-10-PCS | Performed by: COLON & RECTAL SURGERY

## 2024-12-31 PROCEDURE — 99024 POSTOP FOLLOW-UP VISIT: CPT | Performed by: UROLOGY

## 2024-12-31 RX ORDER — GABAPENTIN 100 MG/1
100 CAPSULE ORAL 3 TIMES DAILY
Status: DISCONTINUED | OUTPATIENT
Start: 2024-12-31 | End: 2025-01-03 | Stop reason: HOSPADM

## 2024-12-31 RX ORDER — METHOCARBAMOL 500 MG/1
500 TABLET, FILM COATED ORAL EVERY 8 HOURS SCHEDULED
Status: DISCONTINUED | OUTPATIENT
Start: 2024-12-31 | End: 2025-01-03 | Stop reason: HOSPADM

## 2024-12-31 RX ORDER — DEXAMETHASONE SODIUM PHOSPHATE 10 MG/ML
INJECTION, SOLUTION INTRAMUSCULAR; INTRAVENOUS AS NEEDED
Status: DISCONTINUED | OUTPATIENT
Start: 2024-12-31 | End: 2024-12-31

## 2024-12-31 RX ORDER — FENTANYL CITRATE 50 UG/ML
INJECTION, SOLUTION INTRAMUSCULAR; INTRAVENOUS AS NEEDED
Status: DISCONTINUED | OUTPATIENT
Start: 2024-12-31 | End: 2024-12-31

## 2024-12-31 RX ORDER — GLYCOPYRROLATE 0.2 MG/ML
INJECTION INTRAMUSCULAR; INTRAVENOUS AS NEEDED
Status: DISCONTINUED | OUTPATIENT
Start: 2024-12-31 | End: 2024-12-31

## 2024-12-31 RX ORDER — HYDROMORPHONE HCL/PF 1 MG/ML
0.5 SYRINGE (ML) INJECTION
Status: DISCONTINUED | OUTPATIENT
Start: 2024-12-31 | End: 2024-12-31 | Stop reason: HOSPADM

## 2024-12-31 RX ORDER — PANTOPRAZOLE SODIUM 40 MG/1
40 TABLET, DELAYED RELEASE ORAL
Status: DISCONTINUED | OUTPATIENT
Start: 2025-01-01 | End: 2025-01-03 | Stop reason: HOSPADM

## 2024-12-31 RX ORDER — ACETAMINOPHEN 325 MG/1
975 TABLET ORAL EVERY 6 HOURS SCHEDULED
Status: DISCONTINUED | OUTPATIENT
Start: 2024-12-31 | End: 2025-01-03 | Stop reason: HOSPADM

## 2024-12-31 RX ORDER — ONDANSETRON 2 MG/ML
INJECTION INTRAMUSCULAR; INTRAVENOUS AS NEEDED
Status: DISCONTINUED | OUTPATIENT
Start: 2024-12-31 | End: 2024-12-31

## 2024-12-31 RX ORDER — HYDROMORPHONE HCL/PF 1 MG/ML
SYRINGE (ML) INJECTION AS NEEDED
Status: DISCONTINUED | OUTPATIENT
Start: 2024-12-31 | End: 2024-12-31

## 2024-12-31 RX ORDER — SODIUM CHLORIDE, SODIUM LACTATE, POTASSIUM CHLORIDE, CALCIUM CHLORIDE 600; 310; 30; 20 MG/100ML; MG/100ML; MG/100ML; MG/100ML
INJECTION, SOLUTION INTRAVENOUS CONTINUOUS PRN
Status: DISCONTINUED | OUTPATIENT
Start: 2024-12-31 | End: 2024-12-31

## 2024-12-31 RX ORDER — SODIUM CHLORIDE, SODIUM LACTATE, POTASSIUM CHLORIDE, CALCIUM CHLORIDE 600; 310; 30; 20 MG/100ML; MG/100ML; MG/100ML; MG/100ML
125 INJECTION, SOLUTION INTRAVENOUS CONTINUOUS
Status: DISCONTINUED | OUTPATIENT
Start: 2024-12-31 | End: 2025-01-02

## 2024-12-31 RX ORDER — HEPARIN SODIUM 5000 [USP'U]/ML
5000 INJECTION, SOLUTION INTRAVENOUS; SUBCUTANEOUS EVERY 8 HOURS SCHEDULED
Status: DISCONTINUED | OUTPATIENT
Start: 2024-12-31 | End: 2025-01-02

## 2024-12-31 RX ORDER — ROCURONIUM BROMIDE 10 MG/ML
INJECTION, SOLUTION INTRAVENOUS AS NEEDED
Status: DISCONTINUED | OUTPATIENT
Start: 2024-12-31 | End: 2024-12-31

## 2024-12-31 RX ORDER — CEFAZOLIN SODIUM 1 G/3ML
INJECTION, POWDER, FOR SOLUTION INTRAMUSCULAR; INTRAVENOUS AS NEEDED
Status: DISCONTINUED | OUTPATIENT
Start: 2024-12-31 | End: 2024-12-31

## 2024-12-31 RX ORDER — PROPOFOL 10 MG/ML
INJECTION, EMULSION INTRAVENOUS AS NEEDED
Status: DISCONTINUED | OUTPATIENT
Start: 2024-12-31 | End: 2024-12-31

## 2024-12-31 RX ORDER — ACETAMINOPHEN 325 MG/1
650 TABLET ORAL EVERY 6 HOURS PRN
Status: DISCONTINUED | OUTPATIENT
Start: 2024-12-31 | End: 2024-12-31

## 2024-12-31 RX ORDER — ONDANSETRON 2 MG/ML
4 INJECTION INTRAMUSCULAR; INTRAVENOUS ONCE AS NEEDED
Status: DISCONTINUED | OUTPATIENT
Start: 2024-12-31 | End: 2024-12-31 | Stop reason: HOSPADM

## 2024-12-31 RX ORDER — METRONIDAZOLE 500 MG/100ML
500 INJECTION, SOLUTION INTRAVENOUS ONCE
Status: COMPLETED | OUTPATIENT
Start: 2024-12-31 | End: 2024-12-31

## 2024-12-31 RX ORDER — MIDAZOLAM HYDROCHLORIDE 2 MG/2ML
INJECTION, SOLUTION INTRAMUSCULAR; INTRAVENOUS AS NEEDED
Status: DISCONTINUED | OUTPATIENT
Start: 2024-12-31 | End: 2024-12-31

## 2024-12-31 RX ORDER — PHENYLEPHRINE HCL IN 0.9% NACL 1 MG/10 ML
SYRINGE (ML) INTRAVENOUS AS NEEDED
Status: DISCONTINUED | OUTPATIENT
Start: 2024-12-31 | End: 2024-12-31

## 2024-12-31 RX ORDER — FENTANYL CITRATE/PF 50 MCG/ML
25 SYRINGE (ML) INJECTION
Status: DISCONTINUED | OUTPATIENT
Start: 2024-12-31 | End: 2024-12-31 | Stop reason: HOSPADM

## 2024-12-31 RX ORDER — CEFAZOLIN SODIUM 2 G/50ML
2000 SOLUTION INTRAVENOUS ONCE
Status: DISCONTINUED | OUTPATIENT
Start: 2024-12-31 | End: 2024-12-31 | Stop reason: HOSPADM

## 2024-12-31 RX ORDER — HEPARIN SODIUM 5000 [USP'U]/ML
5000 INJECTION, SOLUTION INTRAVENOUS; SUBCUTANEOUS ONCE
Status: COMPLETED | OUTPATIENT
Start: 2024-12-31 | End: 2024-12-31

## 2024-12-31 RX ORDER — LIDOCAINE HYDROCHLORIDE 10 MG/ML
INJECTION, SOLUTION EPIDURAL; INFILTRATION; INTRACAUDAL; PERINEURAL AS NEEDED
Status: DISCONTINUED | OUTPATIENT
Start: 2024-12-31 | End: 2024-12-31

## 2024-12-31 RX ADMIN — DEXAMETHASONE SODIUM PHOSPHATE 10 MG: 10 INJECTION, SOLUTION INTRAMUSCULAR; INTRAVENOUS at 08:06

## 2024-12-31 RX ADMIN — Medication 100 MCG: at 08:18

## 2024-12-31 RX ADMIN — SODIUM CHLORIDE, SODIUM LACTATE, POTASSIUM CHLORIDE, AND CALCIUM CHLORIDE 125 ML/HR: .6; .31; .03; .02 INJECTION, SOLUTION INTRAVENOUS at 12:05

## 2024-12-31 RX ADMIN — HYDROMORPHONE HYDROCHLORIDE 0.5 MG: 1 INJECTION, SOLUTION INTRAMUSCULAR; INTRAVENOUS; SUBCUTANEOUS at 09:02

## 2024-12-31 RX ADMIN — ROCURONIUM BROMIDE 50 MG: 10 INJECTION, SOLUTION INTRAVENOUS at 08:01

## 2024-12-31 RX ADMIN — HYDROMORPHONE HYDROCHLORIDE 0.25 MG: 1 INJECTION, SOLUTION INTRAMUSCULAR; INTRAVENOUS; SUBCUTANEOUS at 10:15

## 2024-12-31 RX ADMIN — GLYCOPYRROLATE 0.2 MG: 0.2 INJECTION, SOLUTION INTRAMUSCULAR; INTRAVENOUS at 09:01

## 2024-12-31 RX ADMIN — Medication 100 MCG: at 09:42

## 2024-12-31 RX ADMIN — CEFAZOLIN 2000 MG: 1 INJECTION, POWDER, FOR SOLUTION INTRAMUSCULAR; INTRAVENOUS at 08:11

## 2024-12-31 RX ADMIN — SODIUM CHLORIDE, SODIUM LACTATE, POTASSIUM CHLORIDE, AND CALCIUM CHLORIDE: .6; .31; .03; .02 INJECTION, SOLUTION INTRAVENOUS at 09:13

## 2024-12-31 RX ADMIN — PHENYLEPHRINE HYDROCHLORIDE 40 MCG/MIN: 10 INJECTION INTRAVENOUS at 08:55

## 2024-12-31 RX ADMIN — LIDOCAINE HYDROCHLORIDE 100 MG: 10 INJECTION, SOLUTION EPIDURAL; INFILTRATION; INTRACAUDAL; PERINEURAL at 07:59

## 2024-12-31 RX ADMIN — ACETAMINOPHEN 975 MG: 325 TABLET, FILM COATED ORAL at 23:00

## 2024-12-31 RX ADMIN — HEPARIN SODIUM 5000 UNITS: 5000 INJECTION, SOLUTION INTRAVENOUS; SUBCUTANEOUS at 21:31

## 2024-12-31 RX ADMIN — MIDAZOLAM 2 MG: 1 INJECTION INTRAMUSCULAR; INTRAVENOUS at 07:56

## 2024-12-31 RX ADMIN — SUGAMMADEX 200 MG: 100 INJECTION, SOLUTION INTRAVENOUS at 11:14

## 2024-12-31 RX ADMIN — DEXMEDETOMIDINE HYDROCHLORIDE 8 MCG: 100 INJECTION, SOLUTION INTRAVENOUS at 09:15

## 2024-12-31 RX ADMIN — SODIUM CHLORIDE, SODIUM LACTATE, POTASSIUM CHLORIDE, AND CALCIUM CHLORIDE 125 ML/HR: .6; .31; .03; .02 INJECTION, SOLUTION INTRAVENOUS at 20:24

## 2024-12-31 RX ADMIN — ROCURONIUM BROMIDE 30 MG: 10 INJECTION, SOLUTION INTRAVENOUS at 08:47

## 2024-12-31 RX ADMIN — DEXMEDETOMIDINE HYDROCHLORIDE 8 MCG: 100 INJECTION, SOLUTION INTRAVENOUS at 08:15

## 2024-12-31 RX ADMIN — GABAPENTIN 100 MG: 100 CAPSULE ORAL at 21:31

## 2024-12-31 RX ADMIN — ONDANSETRON 4 MG: 2 INJECTION INTRAMUSCULAR; INTRAVENOUS at 11:14

## 2024-12-31 RX ADMIN — FENTANYL CITRATE 50 MCG: 50 INJECTION INTRAMUSCULAR; INTRAVENOUS at 08:43

## 2024-12-31 RX ADMIN — Medication 100 MCG: at 08:53

## 2024-12-31 RX ADMIN — HEPARIN SODIUM 5000 UNITS: 5000 INJECTION, SOLUTION INTRAVENOUS; SUBCUTANEOUS at 07:25

## 2024-12-31 RX ADMIN — PROPOFOL 200 MG: 10 INJECTION, EMULSION INTRAVENOUS at 08:00

## 2024-12-31 RX ADMIN — METRONIDAZOLE: 500 INJECTION, SOLUTION INTRAVENOUS at 08:07

## 2024-12-31 RX ADMIN — Medication 100 MCG: at 07:25

## 2024-12-31 RX ADMIN — Medication 100 MCG: at 08:44

## 2024-12-31 RX ADMIN — Medication 100 MCG: at 08:55

## 2024-12-31 RX ADMIN — FENTANYL CITRATE 50 MCG: 50 INJECTION INTRAMUSCULAR; INTRAVENOUS at 07:59

## 2024-12-31 RX ADMIN — ROCURONIUM BROMIDE 20 MG: 10 INJECTION, SOLUTION INTRAVENOUS at 09:29

## 2024-12-31 RX ADMIN — METHOCARBAMOL 500 MG: 500 TABLET ORAL at 21:31

## 2024-12-31 RX ADMIN — Medication 100 MCG: at 08:08

## 2024-12-31 RX ADMIN — ACETAMINOPHEN 975 MG: 325 TABLET, FILM COATED ORAL at 17:20

## 2024-12-31 RX ADMIN — Medication 100 MCG: at 08:26

## 2024-12-31 RX ADMIN — Medication 100 MCG: at 11:00

## 2024-12-31 RX ADMIN — SODIUM CHLORIDE, SODIUM LACTATE, POTASSIUM CHLORIDE, AND CALCIUM CHLORIDE: .6; .31; .03; .02 INJECTION, SOLUTION INTRAVENOUS at 07:54

## 2024-12-31 RX ADMIN — Medication: at 12:05

## 2024-12-31 RX ADMIN — ROCURONIUM BROMIDE 20 MG: 10 INJECTION, SOLUTION INTRAVENOUS at 10:20

## 2024-12-31 RX ADMIN — GABAPENTIN 100 MG: 100 CAPSULE ORAL at 17:20

## 2024-12-31 NOTE — ANESTHESIA POSTPROCEDURE EVALUATION
Post-Op Assessment Note    CV Status:  Stable  Pain Score: 0    Pain management: adequate       Mental Status:  Alert and awake   Hydration Status:  Euvolemic   PONV Controlled:  Controlled   Airway Patency:  Patent  Two or more mitigation strategies used for obstructive sleep apnea   Post Op Vitals Reviewed: Yes    No anethesia notable event occurred.    Staff: CRNA           Last Filed PACU Vitals:  Vitals Value Taken Time   Temp     Pulse 64    BP 97/63 12/31/24 1128   Resp 14    SpO2 100%     Vitals shown include unfiled device data.    Modified Doron:  Activity: 2 (12/30/2024 12:23 PM)  Respiration: 2 (12/30/2024 12:23 PM)  Circulation: 2 (12/30/2024 12:23 PM)  Consciousness: 2 (12/30/2024 12:23 PM)  Oxygen Saturation: 2 (12/30/2024 12:23 PM)  Modified Doron Score: 10 (12/30/2024 12:23 PM)

## 2024-12-31 NOTE — PLAN OF CARE
Problem: PAIN - ADULT  Goal: Verbalizes/displays adequate comfort level or baseline comfort level  Description: Interventions:  - Encourage patient to monitor pain and request assistance  - Assess pain using appropriate pain scale  - Administer analgesics based on type and severity of pain and evaluate response  - Implement non-pharmacological measures as appropriate and evaluate response  - Consider cultural and social influences on pain and pain management  - Notify physician/advanced practitioner if interventions unsuccessful or patient reports new pain  Outcome: Progressing     Problem: INFECTION - ADULT  Goal: Absence or prevention of progression during hospitalization  Description: INTERVENTIONS:  - Assess and monitor for signs and symptoms of infection  - Monitor lab/diagnostic results  - Monitor all insertion sites, i.e. indwelling lines, tubes, and drains  - Monitor endotracheal if appropriate and nasal secretions for changes in amount and color  - [place] appropriate cooling/warming therapies per order  - Administer medications as ordered  - Instruct and encourage patient and family to use good hand hygiene technique  - Identify and instruct in appropriate isolation precautions for identified infection/condition  Outcome: Progressing     Problem: SAFETY ADULT  Goal: Patient will remain free of falls  Description: INTERVENTIONS:  - Educate patient/family on patient safety including physical limitations  - Instruct patient to call for assistance with activity   - Consult OT/PT to assist with strengthening/mobility   - Keep Call bell within reach  - Keep bed low and locked with side rails adjusted as appropriate  - Keep care items and personal belongings within reach  - Initiate and maintain comfort rounds  - Apply yellow socks and bracelet for high fall risk patients  - Consider moving patient to room near nurses station  Outcome: Progressing     Problem: DISCHARGE PLANNING  Goal: Discharge to home or  other facility with appropriate resources  Description: INTERVENTIONS:  - Identify barriers to discharge w/patient and caregiver  - Arrange for needed discharge resources and transportation as appropriate  - Identify discharge learning needs (meds, wound care, etc.)  - Arrange for interpretive services to assist at discharge as needed  - Refer to Case Management Department for coordinating discharge planning if the patient needs post-hospital services based on physician/advanced practitioner order or complex needs related to functional status, cognitive ability, or social support system  Outcome: Progressing     Problem: Knowledge Deficit  Goal: Patient/family/caregiver demonstrates understanding of disease process, treatment plan, medications, and discharge instructions  Description: Complete learning assessment and assess knowledge base.  Interventions:  - Provide teaching at level of understanding  - Provide teaching via preferred learning methods  Outcome: Progressing     Problem: GASTROINTESTINAL - ADULT  Goal: Minimal or absence of nausea and/or vomiting  Description: INTERVENTIONS:  - Administer IV fluids if ordered to ensure adequate hydration  - Maintain NPO status until nausea and vomiting are resolved  - Nasogastric tube if ordered  - Administer ordered antiemetic medications as needed  - Provide nonpharmacologic comfort measures as appropriate  - Advance diet as tolerated, if ordered  - Consider nutrition services referral to assist patient with adequate nutrition and appropriate food choices  Outcome: Progressing  Goal: Maintains or returns to baseline bowel function  Description: INTERVENTIONS:  - Assess bowel function  - Encourage oral fluids to ensure adequate hydration  - Administer IV fluids if ordered to ensure adequate hydration  - Administer ordered medications as needed  - Encourage mobilization and activity  - Consider nutritional services referral to assist patient with adequate nutrition and  appropriate food choices  Outcome: Progressing  Goal: Maintains adequate nutritional intake  Description: INTERVENTIONS:  - Monitor percentage of each meal consumed  - Identify factors contributing to decreased intake, treat as appropriate  - Assist with meals as needed  - Monitor I&O, weight, and lab values if indicated  - Obtain nutrition services referral as needed  Outcome: Progressing     Problem: METABOLIC, FLUID AND ELECTROLYTES - ADULT  Goal: Electrolytes maintained within normal limits  Description: INTERVENTIONS:  - Monitor labs and assess patient for signs and symptoms of electrolyte imbalances  - Administer electrolyte replacement as ordered  - Monitor response to electrolyte replacements, including repeat lab results as appropriate  - Instruct patient on fluid and nutrition as appropriate  Outcome: Progressing  Goal: Fluid balance maintained  Description: INTERVENTIONS:  - Monitor labs   - Monitor I/O and WT  - Instruct patient on fluid and nutrition as appropriate  - Assess for signs & symptoms of volume excess or deficit  Outcome: Progressing     Problem: SKIN/TISSUE INTEGRITY - ADULT  Goal: Incision(s), wounds(s) or drain site(s) healing without S/S of infection  Description: INTERVENTIONS  - Assess and document dressing, incision, wound bed, drain sites and surrounding tissue  - Provide patient and family education  - Perform skin care/dressing changes every   Outcome: Progressing     Problem: HEMATOLOGIC - ADULT  Goal: Maintains hematologic stability  Description: INTERVENTIONS  - Assess for signs and symptoms of bleeding or hemorrhage  - Monitor labs  - Administer supportive blood products/factors as ordered and appropriate  Outcome: Progressing

## 2024-12-31 NOTE — OP NOTE
Operative Note     PATIENT:  Darin Bullock (MRN [id number])    DATE OF PROCEDURE:   12/31/2024    PRE-OP DIAGNOSIS:   1) colon cancer  2) need for bilateral ureteral identification    POST-OP DIAGNOSIS:   1) colon cancer  2) need for bilateral ureteral identification    PROCEDURES PERFORMED:  1) Cystoscopy  2) bilateral ureteral catheter placement    SURGEON:  Joseph Izaguirre MD    ASSISTANTS:  There were no qualified teaching residents to assist with this case    ANESTHESIA: General     COMPLICATIONS:   None    ANTIBIOTICS:  Ancef    INTRAOPERATIVE THROMBOEMBOLISM PROPHYLAXIS:  Pneumatic compression stockings         INDICATIONS FOR PROCEDURE:  Darin Bullock is an 54 y.o. old male with colon cancer presents for colostomy reversal.  Intraoperative cystoscopy and bilateral ureteral catheter placement has been requested by the primary operative team.  I evaluated the patient prior to surgery.  We discussed the procedure in detail, the alternatives, and the risks, and they signed informed consent to proceed.    PROCEDURE IN DETAIL:   The patient was identified and brought to the OR.  Antibiotic prophylaxis and DVT prophylaxis were administered.  They were placed in the comfortable dorsal lithotomy position with care to pad all pressure points.  They were prepped and draped in the usual sterile fashion using hibiclens.  A surgical time out was performed with all in the room in agreement with the correct patient, procedure, indications, and laterality.  A 21-[place] rigid cystoscope was used to enter the bladder.  The bladder was inspected in its entirety and there were no lesions noted.  The ureteral orifices were identified in their normal orthotopic positions.     The right ureteral orifice was identified and cannulated with a a 5 Fr open ended catheter.  This was advanced under vision to 25 cm.  No resistance was encountered.  The identical procedure was then carried out the contralateral left side.  The endoscope  was removed.  A Mosquera catheter was placed.  Each ureteral stent is then internalized into the Mosquera catheter using a standard adapter device and gravity bag drainage.     Patient was then dispositioned to the primary team for the remainder of the case.    COMPLICATIONS: None    PLAN:   Please remove the ureteral catheters at the conclusion of the case.  Please reconsult urology if needed

## 2024-12-31 NOTE — RESPIRATORY THERAPY NOTE
RT Protocol Note  Darin Bullock 54 y.o. male MRN: [id number]  [facility] Encounter: [id number]    Assessment    Principal Problem:    Bowel obstruction (HCC)      Home Pulmonary Medications:         Past Medical History:   Diagnosis Date    Cancer (HCC)     colon cancer    GERD (gastroesophageal reflux disease)      Social History     Socioeconomic History    Marital status: Single     Spouse name: None    Number of children: None    Years of education: None    Highest education level: None   Occupational History    None   Tobacco Use    Smoking status: Never     Passive exposure: Never    Smokeless tobacco: Never    Tobacco comments:     Denies any use per pt    Vaping Use    Vaping status: None   Substance and Sexual Activity    Alcohol use: Never     Comment: Denies any use of any alcohol    Drug use: Never     Comment: Denies any drug use per pt    Sexual activity: Yes     Comment: Denies any chest pain or shortness of breath with activity   Other Topics Concern    None   Social History Narrative    None     Social Drivers of Health     Financial Resource Strain: Not on file   Food Insecurity: No Food Insecurity (10/23/2024)    Nursing - Inadequate Food Risk Classification     Worried About Running Out of Food in the Last Year: Not on file     Ran Out of Food in the Last Year: Not on file     Ran Out of Food in the Last Year: 1   Transportation Needs: No Transportation Needs (10/23/2024)    Nursing - Transportation Risk Classification     Lack of Transportation: Not on file     Lack of Transportation: 2   Physical Activity: Not on file   Stress: Not on file   Social Connections: Not on file   Intimate Partner Violence: Unknown (10/23/2024)    Nursing IPS     Feels Physically and Emotionally Safe: Not on file     Physically Hurt by Someone: Not on file     Humiliated or Emotionally Abused by Someone: Not on file     Physically Hurt by Someone: 2     Hurt or Threatened by Someone: 2   Housing  "Stability: Unknown (10/23/2024)    Nursing: Inadequate Housing Risk Classification     Has Housing: Not on file     Worried About Losing Housing: Not on file     Unable to Get Utilities: Not on file     Unable to Pay for Housing in the Last Year: 2     Has Housin       Subjective         Objective    Physical Exam:   Assessment Type: (P) Assess only  General Appearance: (P) Alert, Awake  Respiratory Pattern: (P) Normal  Chest Assessment: (P) Chest expansion symmetrical  Bilateral Breath Sounds: (P) Clear    Vitals:  Blood pressure 127/86, pulse 83, temperature (!) 97.3 °F (36.3 °C), resp. rate 16, height 5' 10\" (1.778 m), weight 83.9 kg (185 lb), SpO2 97%.          Imaging and other studies:           Plan    Respiratory Plan: (P) Discontinue Protocol  Airway Clearance Plan: (P) Incentive Spirometer, Discontinue Protocol     Resp Comments: (P) post operative pe with no pulmonary Hx or home resp meds. Bs clear, no signs of resp distress at this time. pt is ordered and instructed on I.S Q1 W/A . pt is able to perform IS indepandently, ACP and resp protocol will be D/C'd   "

## 2024-12-31 NOTE — ASSESSMENT & PLAN NOTE
POD#0 s/p bilateral ureteral stent placement, laparoscopic hand-assisted closure colostomy by Dr. Llanos  -Clear liquid diet  -LR at 125  -Mosquera in place, 300 cc blood-tinged urine  -PCA Dilaudid pump for pain control with additional multimodal regimen  -As needed antiemetics  -SQH for DVT prophylaxis  -Encourage incentive spirometer use  -Encourage out of bed and ambulation with PT and OT starting 1/1

## 2024-12-31 NOTE — OP NOTE
OPERATIVE REPORT  PATIENT NAME: Darin Bullock    :  1970  MRN: [id number]  Pt Location: BE OR ROOM 05    SURGERY DATE: 2024    Surgeons and Role:  Panel 1:     * Joseph Izaguirre MD - Primary  Panel 2:     * EVERTON Llanos MD - Primary     * Andrew Betancourt MD - Assisting    Preop Diagnosis:  Adenocarcinoma of sigmoid colon (HCC) [C18.7]  Will colostomy    Post-Op Diagnosis Codes:     * Adenocarcinoma of sigmoid colon (HCC) [C18.7]  Will colostomy    Procedure(s):  Bilateral - INSERTION URETERAL CATHETERS PREOP  CLOSURE COLOSTOMY. LAPAROSCOPIC-HAND ASSISTED. LAPAROSCOPIC MOBILIZATION OF THE SPLENIC FLEXURE. SIGMOIDOSCOPY.    Specimen(s):  ID Type Source Tests Collected by Time Destination   1 : Colostomy Tissue Colon TISSUE EXAM Joseph Izaguirre MD 2024 1026        Estimated Blood Loss:   Minimal    Drains:  Urethral Catheter Latex 16 Fr. (Active)   Number of days: 0       Anesthesia Type:   General    Operative Indications:  Adenocarcinoma of sigmoid colon (HCC) [C18.7]  Will colostomy    Operative Findings:  Mild scarring of the sigmoid mesentery and omentum adjacent to the splenic flexure.  Visible staple line at the rectum, approximately 3 cm distal to the sacral promontory.    Complications:   None    Procedure and Technique:  The patient was placed in a supine position with legs in Ramon boots.  He was on a pink pad, his arms were wrapped at his side using pink pad material, and the chest was wrapped with a pink pad wrap.  The colostomy was sewn closed using a full-thickness run of 0 silk suture for an airtight seal on the colostomy end.  The abdomen was then prepped using ChloraPrep including the colostomy.  This was allowed to dry.  We then prepped the abdomen using Betadine.  The area was draped in a sterile manner.  This included placement of a Ray-Manasa over the colostomy and using an Ioban drape.  A timeout was done.    The lower midline incision was  reentered.  This was a prior opening for hand-assisted technique.  Cautery was used to dissected down to the fascia.  The fascia was opened with cautery and the abdomen was quickly entered.  There were no adhesions to the abdominal wall.  A GelPort was positioned.  An 11 mm trocar site was reopened at the right supraumbilical region and an 11 mm trocar was positioned.  5 mm trocars were placed in the right upper abdomen and the right lower quadrant.    Laparoscopic evaluation revealed that the rectal stump was clearly visible without any adhesions to it.  The bowel could easily be removed from the pelvis.  There was minimal attachment of the appendix in the pelvis.  This was broken down using minimal blunt dissection.  The pelvis required no further dissection.  The colostomy itself was clear of surrounding adhesions at the abdominal wall.  The colostomy blocked view of the lateral white line of Toldt on the descending/sigmoid colon junction.  For that reason, the colostomy was taken down to allow for better visualization.    A horizontally oriented elliptical incision was created around the colostomy.  Dissection was carried down to the serosa of the bowel and mesentery through the skin incision.  Dissection was then continued down to the fascia where there were dense adhesions that were lysed without difficulty.  The bowel was fully  from its attachments to the wound.  No spillage of stool was noted.  The bowel was positioned in the abdominal cavity.  An Ramone port was placed through the colostomy opening to allow for insufflation and positioning of 5 mm trocar.    We could not see the white line of Toldt.  The bed was repositioned at times to allow for clearance of the bowel from areas of dissection.  The white line of Toldt was opened and dissection was carried out but high in the descending colon mesentery near the splenic flexure.  Dissection was carried toward the medial approach and we therefore  used the medial approach at the ligament of Treitz to lift the inferior mesenteric vein and enter the avascular plane behind the vein.  Dissection quickly revealed the lateral dissection.  Dissection was carried from that point down to the level of the original position of the inferior mesenteric artery, avoiding the ureter which could be palpated in the retroperitoneum.  The inferior mesenteric vein was divided using multiple fires of an Enseal at the level of the ligament of Treitz.  Dissection was carried onto the distal transverse colon mesentery to the allow for maximal mobilization.  Lateral dissection was carried up to and around the splenic flexure.  A densely thickened omentum with some scarring to the splenic flexure and transverse colon was taken down from the surface of the colon with Enseal without difficulty.  This was carried all the way across to the mid transverse colon, allowing for complete delivery of the splenic flexure and descending colon into the pelvis.  The areas of dissection were carefully inspected and were seen to be dry.    The patient was repositioned in a Trendelenburg position.  The ureter was palpated in the pelvis and superior to the original position of the inferior mesenteric artery but there were scarred adhesions and thickening of the mesentery at the that area, with the ureter passing immediately beneath this.  The dissection was carried out in order to leave the retroperitoneal nerves and ureter with associated gonadal vessels intact.  I am not sure whether the base of the inferior mesenteric artery was transected or if it had been transected at the prior operation but lymphadenectomy would have been complete with the dissection that was performed.  The ureter could be palpated behind the area of dissection where the scarring was noted.  This created great length of the left colon to drop into the pelvis.    The GelPort lid was removed and the bowel was delivered into the  wound.  The area of the colostomy that was near the fascia and distal to the fascia was removed.  The bowel proximal to this was quite supple and the thickening that had been noted at the prior operation have been greatly reduced.  The bowel appeared to be relatively normal.  The mesentery was divided to prepare the proximal side of the anastomosis.  A pursestring device was passed across the colon and clamped.  A 2-0 Prolene pursestring was passed through it and the anvil of a 31 mm CDH stapler was adhered in the proximal side of the anastomosis.  A double wrap of full-thickness and supple bowel was included in the pursestring.  The specimen was sent for pathologic evaluation as colostomy.  The bowel that was used was quite supple, relatively normal sized caliber, and otherwise unremarkable.  It was well-vascularized.  The bowel was cleansed and dried and returned to the abdominal cavity.  Gloves were changed.    The abdomen was then inspected for bleeding.  The left upper quadrant was completely free of bleeding or any other evidence of injury.  Dissection areas all the way down to the pelvis were free of bleeding.    A double stapled anastomosis was then undertaken without difficulty.  2 circumferentially complete donuts were retrieved.  They were thick.  The anastomosis was tested with a sigmoidoscope with firm inflation of gas.  No gas leakage was noted under a water bath.  The bowel was well-vascularized and there was no tension on the anastomosis whatsoever.  The the bowel proximal to the anastomosis was quite lengthy and flopped into the pelvis without difficulty.  The staple line in the rectum was clearly visualized at the time of the creation of the anastomosis and no dissection on the rectum was required.  The anastomosis was circumferentially intact and the bowel was pink on either side of the sigmoidoscopy.    Trocars were removed.  They were noncutting trocars.  The Ramone port was removed.  The  GelPort was removed.  Wounds were irrigated and dried.  The colostomy site fascia was closed using a running 1 PDS suture.  It was palpated posteriorly through the lower midline incision and was seen to be free of any adhesions.  The midline fascia was closed using a running 1 PDS suture placed from either end of the wound.  The wounds were irrigated and dried once again.  The skin was closed using a stapler.  Gauze was applied.    Sponge needle and instrument counts were correct.  Wand technique revealed no retained gauze.     I was present for the entire procedure.    Patient Disposition:  PACU     This procedure was not performed to treat colon cancer through resection           SIGNATURE: GOLDIE Llanos MD  DATE: December 31, 2024  TIME: 11:14 AM

## 2024-12-31 NOTE — ANESTHESIA PREPROCEDURE EVALUATION
Procedure:  INSERTION URETERAL CATHETERS PREOP (Bilateral: Ureter)  CLOSURE COLOSTOMY, LAPAROSCOPIC (Abdomen)    Relevant Problems   GI/HEPATIC   (+) Adenocarcinoma of sigmoid colon (HCC)   (+) Bowel obstruction (HCC)   (+) GERD (gastroesophageal reflux disease)        Physical Exam    Airway    Mallampati score: II  TM Distance: >3 FB  Neck ROM: full     Dental       Cardiovascular      Pulmonary      Other Findings        Anesthesia Plan  ASA Score- 2     Anesthesia Type- general with ASA Monitors.         Additional Monitors:     Airway Plan: ETT.           Plan Factors-Exercise tolerance (METS): >4 METS.    Chart reviewed.        Patient is not a current smoker.  Patient did not smoke on day of surgery.    Obstructive sleep apnea risk education given perioperatively.        Induction- intravenous.    Postoperative Plan- Plan for postoperative opioid use. Planned trial extubation    Perioperative Resuscitation Plan - Level 1 - Full Code.       Informed Consent- Anesthetic plan and risks discussed with patient.  I personally reviewed this patient with the CRNA. Discussed and agreed on the Anesthesia Plan with the CRNA..    Anesthesia plan and consent discussed with Darin who expressed understanding and agreement. Risks/benefits and alternatives discussed with patient including possible PONV, sore throat, damage to teeth/lips/gums and possibility of rare anesthetic and surgical emergencies.

## 2024-12-31 NOTE — PROGRESS NOTES
54-year-old male with colon cancer status post colectomy presents for colostomy reversal with Dr. Hicks.  Intraoperative cystoscopy and bilateral ureteral catheter placement as requested by the primary team.  I met with the patient reviewed the goals of the procedure, the risks, the benefits, the alternatives and expected postoperative care.  Informed consent obtained

## 2024-12-31 NOTE — INTERVAL H&P NOTE
H&P reviewed. After examining the patient I find no changes in the patients condition since the H&P had been written.  Head and all 4s ok.  The patient has had removal of polyps only 1 day ago.  These polyps were broad but appeared benign.  Complete removal cannot be guaranteed.  This will need follow-up in 3 months.  The patient declines delay to establish the diagnoses of these polyps and lieu of closure of his stoma.  This is due to personal reasons that are work-related.  He wants to return to work as soon as possible.  He understands that there may be a need for future surgery should malignancy or recurrence of the polyps be present.  He declines waiting for this information.    Vitals:    12/31/24 0621   BP: 118/82   Pulse: 86   Resp: 20   Temp: 99.6 °F (37.6 °C)   SpO2: 96%

## 2024-12-31 NOTE — PROGRESS NOTES
Postop check- Colorectal   Name: Darin Bullock 54 y.o. male I MRN: [id number]  [facility] Date of Admission: 12/31/2024   Date of Service: 12/31/2024 I Hospital Day: 0    Assessment & Plan  Adenocarcinoma of sigmoid colon (HCC)  POD#0 s/p bilateral ureteral stent placement, laparoscopic hand-assisted closure colostomy by Dr. Llanos  -Clear liquid diet  -LR at 125  -Mosquera in place, 300 cc blood-tinged urine  -PCA Dilaudid pump for pain control with additional multimodal regimen  -As needed antiemetics  -SQH for DVT prophylaxis  -Encourage incentive spirometer use  -Encourage out of bed and ambulation with PT and OT starting 1/1    Colorectal service will follow.    Subjective   General: Patient offers no other complaints at this time.  He denies any nausea or vomiting, chest pain or shortness of breath, fevers or chills.  Pain: Currently a 4 out of 10 with PCA pump in place  N/V: Denies any  Tolerating Diet: On clear liquid diet  -Flatus and -BM  OOB and ambulating starting 1/1    Objective :  Temp:  [97.3 °F (36.3 °C)-99.6 °F (37.6 °C)] 97.3 °F (36.3 °C)  HR:  [65-86] 83  BP: ()/(60-86) 127/86  Resp:  [12-51] 16  SpO2:  [96 %-100 %] 97 %  O2 Device: Nasal cannula  Nasal Cannula O2 Flow Rate (L/min):  [2 L/min] 2 L/min    I/O         12/29 0701 12/30 0700 12/30 0701 12/31 0700 12/31 0701  01/01 0700    I.V. (mL/kg)   1500 (17.9)    IV Piggyback   100    Total Intake(mL/kg)   1600 (19.1)    Urine (mL/kg/hr)   350 (0.5)    Total Output   350    Net   +1250                 Lines/Drains/Airways       Active Status       Name Placement date Placement time Site Days    Urethral Catheter Latex 16 Fr. 12/31/24  0810  Latex  less than 1                  Physical Exam   General: Pt is AAOx3, lying down in bed in NAD.   HEENT: normocephalic, moist mucous membranes   Neck: Supple, non-tender, ROM intact   CV: RRR, no murmurs, gallops, rubs. S1 and S2.   Resp: Lung sounds clear to auscultation B/L, normal  "respiratory effort no wheezes, rhonchi, rhales   Abd: Soft, with appropriate postoperative tenderness of all 4 quadrants, mildly-distended, non-tympanitic.  Hypoactive bowelsounds all 4 quadrants. No rebound or guarding. Abdominal incisions clean, dry, intact, with staples in place, gauze with serosanguineous drainage.    Ext: Warm with no cyanosis, no edema, no deformities. ROM intact   Skin: No rashes, bruises, ulcers.   Neuro: Sensation intact all 4 extremities. 5+ strength all 4 extremities.       Lab Results: I have reviewed the following results:  No results for input(s): \"WBC\", \"HGB\", \"HCT\", \"PLT\", \"BANDSPCT\", \"SODIUM\", \"K\", \"CL\", \"CO2\", \"BUN\", \"CREATININE\", \"GLUC\", \"CAIONIZED\", \"MG\", \"PHOS\", \"AST\", \"ALT\", \"ALB\", \"TBILI\", \"DBILI\", \"ALKPHOS\", \"PTT\", \"INR\", \"HSTNI0\", \"HSTNI2\", \"BNP\", \"LACTICACID\" in the last 72 hours.    Imaging Results Review: No pertinent imaging studies reviewed.  Other Study Results Review: No additional pertinent studies reviewed.    VTE Pharmacologic Prophylaxis: Heparin  VTE Mechanical Prophylaxis: sequential compression device    Aleida Sharma    "

## 2025-01-01 LAB
ANION GAP SERPL CALCULATED.3IONS-SCNC: 5 MMOL/L (ref 4–13)
BASOPHILS # BLD AUTO: 0.01 THOUSANDS/ΜL (ref 0–0.1)
BASOPHILS NFR BLD AUTO: 0 % (ref 0–1)
BUN SERPL-MCNC: 26 MG/DL (ref 5–25)
CALCIUM SERPL-MCNC: 8.5 MG/DL (ref 8.4–10.2)
CHLORIDE SERPL-SCNC: 102 MMOL/L (ref 96–108)
CO2 SERPL-SCNC: 28 MMOL/L (ref 21–32)
CREAT SERPL-MCNC: 1.47 MG/DL (ref 0.6–1.3)
EOSINOPHIL # BLD AUTO: 0 THOUSAND/ΜL (ref 0–0.61)
EOSINOPHIL NFR BLD AUTO: 0 % (ref 0–6)
ERYTHROCYTE [DISTWIDTH] IN BLOOD BY AUTOMATED COUNT: 12.9 % (ref 11.6–15.1)
GFR SERPL CREATININE-BSD FRML MDRD: 52 ML/MIN/1.73SQ M
GLUCOSE SERPL-MCNC: 117 MG/DL (ref 65–140)
HCT VFR BLD AUTO: 42.3 % (ref 36.5–49.3)
HGB BLD-MCNC: 13.9 G/DL (ref 12–17)
IMM GRANULOCYTES # BLD AUTO: 0.05 THOUSAND/UL (ref 0–0.2)
IMM GRANULOCYTES NFR BLD AUTO: 0 % (ref 0–2)
LYMPHOCYTES # BLD AUTO: 0.76 THOUSANDS/ΜL (ref 0.6–4.47)
LYMPHOCYTES NFR BLD AUTO: 6 % (ref 14–44)
MAGNESIUM SERPL-MCNC: 1.9 MG/DL (ref 1.9–2.7)
MCH RBC QN AUTO: 30.2 PG (ref 26.8–34.3)
MCHC RBC AUTO-ENTMCNC: 32.9 G/DL (ref 31.4–37.4)
MCV RBC AUTO: 92 FL (ref 82–98)
MONOCYTES # BLD AUTO: 1.6 THOUSAND/ΜL (ref 0.17–1.22)
MONOCYTES NFR BLD AUTO: 12 % (ref 4–12)
NEUTROPHILS # BLD AUTO: 10.53 THOUSANDS/ΜL (ref 1.85–7.62)
NEUTS SEG NFR BLD AUTO: 82 % (ref 43–75)
NRBC BLD AUTO-RTO: 0 /100 WBCS
PHOSPHATE SERPL-MCNC: 4.1 MG/DL (ref 2.7–4.5)
PLATELET # BLD AUTO: 263 THOUSANDS/UL (ref 149–390)
PMV BLD AUTO: 9.6 FL (ref 8.9–12.7)
POTASSIUM SERPL-SCNC: 4.2 MMOL/L (ref 3.5–5.3)
RBC # BLD AUTO: 4.61 MILLION/UL (ref 3.88–5.62)
SODIUM SERPL-SCNC: 135 MMOL/L (ref 135–147)
WBC # BLD AUTO: 12.95 THOUSAND/UL (ref 4.31–10.16)

## 2025-01-01 PROCEDURE — 84100 ASSAY OF PHOSPHORUS: CPT | Performed by: PHYSICIAN ASSISTANT

## 2025-01-01 PROCEDURE — 97166 OT EVAL MOD COMPLEX 45 MIN: CPT

## 2025-01-01 PROCEDURE — 85025 COMPLETE CBC W/AUTO DIFF WBC: CPT | Performed by: STUDENT IN AN ORGANIZED HEALTH CARE EDUCATION/TRAINING PROGRAM

## 2025-01-01 PROCEDURE — 99024 POSTOP FOLLOW-UP VISIT: CPT | Performed by: COLON & RECTAL SURGERY

## 2025-01-01 PROCEDURE — 97162 PT EVAL MOD COMPLEX 30 MIN: CPT

## 2025-01-01 PROCEDURE — 83735 ASSAY OF MAGNESIUM: CPT | Performed by: PHYSICIAN ASSISTANT

## 2025-01-01 PROCEDURE — 80048 BASIC METABOLIC PNL TOTAL CA: CPT | Performed by: STUDENT IN AN ORGANIZED HEALTH CARE EDUCATION/TRAINING PROGRAM

## 2025-01-01 RX ORDER — MAGNESIUM SULFATE HEPTAHYDRATE 40 MG/ML
2 INJECTION, SOLUTION INTRAVENOUS ONCE
Status: COMPLETED | OUTPATIENT
Start: 2025-01-01 | End: 2025-01-01

## 2025-01-01 RX ORDER — ONDANSETRON 2 MG/ML
4 INJECTION INTRAMUSCULAR; INTRAVENOUS EVERY 4 HOURS PRN
Status: DISCONTINUED | OUTPATIENT
Start: 2025-01-01 | End: 2025-01-03 | Stop reason: HOSPADM

## 2025-01-01 RX ADMIN — GABAPENTIN 100 MG: 100 CAPSULE ORAL at 16:29

## 2025-01-01 RX ADMIN — SODIUM CHLORIDE, SODIUM LACTATE, POTASSIUM CHLORIDE, AND CALCIUM CHLORIDE 125 ML/HR: .6; .31; .03; .02 INJECTION, SOLUTION INTRAVENOUS at 04:22

## 2025-01-01 RX ADMIN — METHOCARBAMOL 500 MG: 500 TABLET ORAL at 22:14

## 2025-01-01 RX ADMIN — HEPARIN SODIUM 5000 UNITS: 5000 INJECTION, SOLUTION INTRAVENOUS; SUBCUTANEOUS at 05:00

## 2025-01-01 RX ADMIN — METHOCARBAMOL 500 MG: 500 TABLET ORAL at 05:00

## 2025-01-01 RX ADMIN — GABAPENTIN 100 MG: 100 CAPSULE ORAL at 22:14

## 2025-01-01 RX ADMIN — PANTOPRAZOLE SODIUM 40 MG: 40 TABLET, DELAYED RELEASE ORAL at 05:00

## 2025-01-01 RX ADMIN — MAGNESIUM SULFATE HEPTAHYDRATE 2 G: 40 INJECTION, SOLUTION INTRAVENOUS at 12:52

## 2025-01-01 RX ADMIN — ACETAMINOPHEN 975 MG: 325 TABLET, FILM COATED ORAL at 05:00

## 2025-01-01 RX ADMIN — METHOCARBAMOL 500 MG: 500 TABLET ORAL at 15:07

## 2025-01-01 RX ADMIN — ACETAMINOPHEN 975 MG: 325 TABLET, FILM COATED ORAL at 11:30

## 2025-01-01 RX ADMIN — HEPARIN SODIUM 5000 UNITS: 5000 INJECTION, SOLUTION INTRAVENOUS; SUBCUTANEOUS at 15:07

## 2025-01-01 RX ADMIN — GABAPENTIN 100 MG: 100 CAPSULE ORAL at 08:36

## 2025-01-01 RX ADMIN — ONDANSETRON 4 MG: 2 INJECTION INTRAMUSCULAR; INTRAVENOUS at 11:41

## 2025-01-01 RX ADMIN — HEPARIN SODIUM 5000 UNITS: 5000 INJECTION, SOLUTION INTRAVENOUS; SUBCUTANEOUS at 22:14

## 2025-01-01 RX ADMIN — ACETAMINOPHEN 975 MG: 325 TABLET, FILM COATED ORAL at 18:03

## 2025-01-01 RX ADMIN — SODIUM CHLORIDE, SODIUM LACTATE, POTASSIUM CHLORIDE, AND CALCIUM CHLORIDE 125 ML/HR: .6; .31; .03; .02 INJECTION, SOLUTION INTRAVENOUS at 22:14

## 2025-01-01 NOTE — ASSESSMENT & PLAN NOTE
53 yo male with descending colostomy from extended left hemicolectomy for adenocarcinoma of sigmoid colon s/p Bilateral ureteral stent placement, laparoscopic hand-assisted closure colostomy 12/31.    VS stable on room  Urine 1450 cc    Plan  -Advance to lo residue diet  -Await return of bowel function  -Old ostomy site dressing changes per nursing  -DC mackenzie if Cr stable  -DC fluids if Cr stable  -Monitor strict I/O's  -DC dPCA  -Multimodal PO regimen  -Anti-emetic PRN  -SQH for DVT prophylaxis  -OOB/ambulation  -IS  -PT/OT: no needs

## 2025-01-01 NOTE — UTILIZATION REVIEW
Initial Clinical Review    Elective IP surgical procedure  Age/Sex: 54 y.o. male  Surgery Date: 12/31/24  Procedure:   Urology -- Cystoscopy & bilateral ureteral catheter placement  Colorectal -- CLOSURE COLOSTOMY. LAPAROSCOPIC-HAND ASSISTED. LAPAROSCOPIC MOBILIZATION OF THE SPLENIC FLEXURE. SIGMOIDOSCOPY.   Anesthesia: General  Operative Findings:   Urology -- A 21-French rigid cystoscope was used to enter the bladder.  The bladder was inspected in its entirety and there were no lesions noted.  The ureteral orifices were identified in their normal orthotopic positions. The right ureteral orifice was identified and cannulated with a a 5 Fr open ended catheter.  This was advanced under vision to 25 cm.  No resistance was encountered.  The identical procedure was then carried out the contralateral left side.  The endoscope was removed.  A Mackenzie catheter was placed.  Each ureteral stent is then internalized into the Mackenzie catheter using a standard adapter device and gravity bag drainage.     Colorectal -- Mild scarring of the sigmoid mesentery and omentum adjacent to the splenic flexure. Visible staple line at the rectum, approximately 3 cm distal to the sacral promontory.     POD#1 Progress Note: Reports pain is controlled. No n/v, no flatus, no bowel events. Exam: hypoactive bowel sounds, abdominal tenderness, urine: brown w/ sediment. WBC 12.95. Crt 1.47. Plan: clear liquid diet, can advance toast and crackers if tolerating, incentive spirometry, maintain mackenzie, IVF, PCA dilaudid pump for pain control, PRN analgesics, antiemetics, SQ heparin. Continuous pulse ox, I&O, Trend labs, replete electrolytes as needed. PT/OT evals.     Admission Orders: Date/Time/Statement:   Admission Orders (From admission, onward)       Ordered        12/31/24 1132  Inpatient Admission  Once                          Orders Placed This Encounter   Procedures    Inpatient Admission     Standing Status:   Standing     Number of Occurrences:    1     Level of Care:   Med Surg [16]     Estimated length of stay:   More than 2 Midnights     Certification:   I certify that inpatient services are medically necessary for this patient for a duration of greater than two midnights. See H&P and MD Progress Notes for additional information about the patient's course of treatment.     Diet: Surgical Diet w/ clear liquids, toast, crackers, no carbonation.  Mobility: Up & OOB as tolerated, PT/OT evals.  DVT Prophylaxis: SCDs, SQ heparin.    Medications/Pain Control:   Scheduled Medications:  acetaminophen, 975 mg, Oral, Q6H VANDANA  gabapentin, 100 mg, Oral, TID  heparin (porcine), 5,000 Units, Subcutaneous, Q8H VANDANA  methocarbamol, 500 mg, Oral, Q8H VANDANA  pantoprazole, 40 mg, Oral, Early Morning    Continuous IV Infusions:  HYDROmorphone, , Intravenous, Continuous   Continuous Rate: 0 mg/hr PCA Dose: 0.2 mg PCA Lock-out: 6 Minutes One Hour Limit: 1.8 mg  lactated ringers, 125 mL/hr, Intravenous, Continuous    PRN Meds:  metroNIDAZOLE (FLAGYL) IVPB (premix) 500 mg 100 mL  Dose: 500 mg  Freq: Once Route: IV  Last Dose: Stopped (12/31/24 0845)  Start: 12/31/24 0600 End: 12/31/24 0845    Vital Signs (last 3 days)       Date/Time Temp Pulse Resp BP MAP (mmHg) SpO2 Calculated FIO2 (%) - Nasal Cannula Nasal Cannula O2 Flow Rate (L/min) O2 Device [place] Coma Scale Score Pain    01/01/25 0729 -- -- -- -- -- 92 % -- -- None (Room air) 15 3    01/01/25 07:24:56 98.6 °F (37 °C) 88 15 117/78 91 93 % -- -- -- -- --    01/01/25 0500 -- -- -- -- -- -- -- -- -- -- 4    01/01/25 03:04:19 98.4 °F (36.9 °C) 92 16 113/79 90 92 % -- -- None (Room air) -- No Pain    12/31/24 2300 -- -- -- -- -- -- -- -- -- -- 4    12/31/24 22:58:35 98.7 °F (37.1 °C) 98 18 115/81 92 92 % -- -- -- -- 4    12/31/24 2100 -- -- -- -- -- -- -- -- None (Room air) 15 No Pain    12/31/24 19:00:30 99 °F (37.2 °C) 92 16 -- -- 91 % -- -- -- -- --    12/31/24 1900 -- -- -- -- -- -- -- -- None (Room air) -- No Pain     12/31/24 18:03:54 98.8 °F (37.1 °C) 102 16 -- -- 89 % -- -- -- -- --    12/31/24 1700 97.9 °F (36.6 °C) -- -- -- -- -- -- -- -- -- No Pain    12/31/24 1651 97.8 °F (36.6 °C) 96 16 129/82 98 97 % -- -- -- -- --    12/31/24 1555 -- 89 16 121/85 97 99 % -- -- -- -- --    12/31/24 1500 98 °F (36.7 °C) -- -- -- -- -- -- -- -- -- --    12/31/24 14:52:38 97.3 °F (36.3 °C) -- 16 127/86 100 -- -- -- -- -- --    12/31/24 1450 -- -- -- -- -- -- -- -- -- -- No Pain    12/31/24 1430 98.8 °F (37.1 °C) 83 12 115/76 90 97 % 28 2 L/min Nasal cannula 15 No Pain    12/31/24 1400 -- 80 12 105/66 80 97 % 28 2 L/min None (Room air) -- --    12/31/24 1330 -- 76 24 110/60 74 97 % 28 2 L/min Nasal cannula -- No Pain    12/31/24 1300 -- 72 22 104/66 81 98 % 28 2 L/min Nasal cannula -- No Pain    12/31/24 1245 -- 70 19 110/65 81 98 % 28 2 L/min Nasal cannula -- No Pain    12/31/24 1230 -- 70 29 100/61 78 98 % 28 2 L/min Nasal cannula -- No Pain    12/31/24 1215 -- 68 35 98/64 77 97 % 28 2 L/min Nasal cannula -- No Pain    12/31/24 1205 -- 66 51 95/64 75 98 % -- -- -- -- No Pain    12/31/24 1200 -- 66 12 95/64 75 98 % -- -- None (Room air) -- No Pain    12/31/24 1145 -- 66 25 92/63 73 100 % 28 2 L/min Nasal cannula 15 No Pain    12/31/24 1130 -- 65 19 97/63 75 97 % 28 2 L/min Nasal cannula -- --    12/31/24 1129 97.3 °F (36.3 °C) 65 19 97/63 75 97 % 28 2 L/min Nasal cannula -- --    12/31/24 0621 99.6 °F (37.6 °C) 86 20 118/82 -- 96 % -- -- None (Room air) -- --    12/31/24 0613 -- -- -- -- -- -- -- -- -- -- No Pain          Weight (last 2 days)       Date/Time Weight    12/31/24 1503 83.9 (184.97)    12/31/24 0613 83.9 (185)            Pertinent Labs/Diagnostic Test Results:   Results from last 7 days   Lab Units 01/01/25  0554   WBC Thousand/uL 12.95*   HEMOGLOBIN g/dL 13.9   HEMATOCRIT % 42.3   PLATELETS Thousands/uL 263   TOTAL NEUT ABS Thousands/µL 10.53*         Results from last 7 days   Lab Units 01/01/25  0554   SODIUM mmol/L 135    POTASSIUM mmol/L 4.2   CHLORIDE mmol/L 102   CO2 mmol/L 28   ANION GAP mmol/L 5   BUN mg/dL 26*   CREATININE mg/dL 1.47*   EGFR ml/min/1.73sq m 52   CALCIUM mg/dL 8.5   MAGNESIUM mg/dL 1.9   PHOSPHORUS mg/dL 4.1         Results from last 7 days   Lab Units 12/31/24  0700   POC GLUCOSE mg/dl 160*     Results from last 7 days   Lab Units 01/01/25  0554   GLUCOSE RANDOM mg/dL 117         Network Utilization Review Department  ATTENTION: Please call with any questions or concerns to [phone] and carefully listen to the prompts so that you are directed to the right person. All voicemails are confidential.   For Discharge needs, contact Care Management DC Support Team at [phone] opt. 2  Send all requests for admission clinical reviews, approved or denied determinations and any other requests to dedicated fax number below belonging to the campus where the patient is receiving treatment. List of dedicated fax numbers for the Facilities:  FACILITY NAME UR FAX NUMBER   ADMISSION DENIALS (Administrative/Medical Necessity) [phone]   DISCHARGE SUPPORT TEAM (NETWORK) [phone]   PARENT CHILD HEALTH (Maternity/NICU/Pediatrics) [phone]   [facility] [phone]   [facility] [phone]   [facility] [phone]   [facility] [phone]   [facility] [phone]   [facility] [phone]   [facility] [phone]   [facility] [phone]   [facility] [phone]   [facility] [phone]   [facility] [phone]   [facility] [phone]

## 2025-01-01 NOTE — PLAN OF CARE
Problem: PAIN - ADULT  Goal: Verbalizes/displays adequate comfort level or baseline comfort level  Description: Interventions:  - Encourage patient to monitor pain and request assistance  - Assess pain using appropriate pain scale  - Administer analgesics based on type and severity of pain and evaluate response  - Implement non-pharmacological measures as appropriate and evaluate response  - Consider cultural and social influences on pain and pain management  - Notify physician/advanced practitioner if interventions unsuccessful or patient reports new pain  Outcome: Progressing     Problem: HEMATOLOGIC - ADULT  Goal: Maintains hematologic stability  Description: INTERVENTIONS  - Assess for signs and symptoms of bleeding or hemorrhage  - Monitor labs  - Administer supportive blood products/factors as ordered and appropriate  Outcome: Progressing     Problem: METABOLIC, FLUID AND ELECTROLYTES - ADULT  Goal: Electrolytes maintained within normal limits  Description: INTERVENTIONS:  - Monitor labs and assess patient for signs and symptoms of electrolyte imbalances  - Administer electrolyte replacement as ordered  - Monitor response to electrolyte replacements, including repeat lab results as appropriate  - Instruct patient on fluid and nutrition as appropriate  Outcome: Progressing  Goal: Fluid balance maintained  Description: INTERVENTIONS:  - Monitor labs   - Monitor I/O and WT  - Instruct patient on fluid and nutrition as appropriate  - Assess for signs & symptoms of volume excess or deficit  Outcome: Progressing

## 2025-01-01 NOTE — PROGRESS NOTES
Progress Note - Colorectal   Name: Darin Bullock 54 y.o. male I MRN: [id number]  [facility] I Date of Admission: 12/31/2024   Date of Service: 1/2/2025 I Hospital Day: 2    Assessment & Plan  Adenocarcinoma of sigmoid colon (HCC)  53 yo male with descending colostomy from extended left hemicolectomy for adenocarcinoma of sigmoid colon s/p Bilateral ureteral stent placement, laparoscopic hand-assisted closure colostomy 12/31.    VS stable on room  Urine 1450 cc    Plan  -Advance to lo residue diet  -Await return of bowel function  -Old ostomy site dressing changes per nursing  -DC mackenzie if Cr stable  -DC fluids if Cr stable  -Monitor strict I/O's  -DC dPCA  -Multimodal PO regimen  -Anti-emetic PRN  -SQH for DVT prophylaxis  -OOB/ambulation  -IS  -PT/OT: no needs    Please contact the SecureChat role for the Colorectal service with any questions/concerns.    Subjective   Patient seen at bedside, not in acute distress.  Patient reports some abdominal soreness but overall pain improving.  Patient had mild nausea yesterday which has improved.  Patient tolerated clears toast crackers.  No bowel movement or flatus but feels rumbling in abdomen.  Ambulated 3 times.  No vomiting fevers chills chest pain or shortness of breath.    Objective :  Temp:  [97.6 °F (36.4 °C)-98.6 °F (37 °C)] 98.1 °F (36.7 °C)  HR:  [80-94] 92  BP: (117-139)/(78-97) 136/88  Resp:  [14-18] 16  SpO2:  [90 %-94 %] 90 %  O2 Device: None (Room air)    I/O         12/30 0701  12/31 0700 12/31 0701  01/01 0700 01/01 0701  01/02 0700    P.O.   236    I.V. (mL/kg)  3142.3 (37.5) 1051.7 (12.5)    IV Piggyback  100     Total Intake(mL/kg)  3242.3 (38.6) 1287.7 (15.3)    Urine (mL/kg/hr)  1150 (0.6)     Total Output  1150     Net  +2092.3 +1287.7                 Lines/Drains/Airways       Active Status       Name Placement date Placement time Site Days    Urethral Catheter Latex 16 Fr. 12/31/24  0810  Latex  1                  Physical Exam  Vitals  and nursing note reviewed.   Constitutional:       General: He is not in acute distress.     Appearance: Normal appearance.   HENT:      Head: Normocephalic and atraumatic.      Right Ear: External ear normal.      Left Ear: External ear normal.   Eyes:      Conjunctiva/sclera: Conjunctivae normal.   Cardiovascular:      Rate and Rhythm: Normal rate.   Pulmonary:      Effort: Pulmonary effort is normal. No respiratory distress.   Abdominal:      Comments: Soft, appropriately tender near incisions, nondistended, mild irritation of skin near her old ostomy site, dressings were removed   Neurological:      Mental Status: He is alert.           Lab Results: I have reviewed the following results:  Recent Labs     01/01/25  0554   WBC 12.95*   HGB 13.9   HCT 42.3      SODIUM 135   K 4.2      CO2 28   BUN 26*   CREATININE 1.47*   GLUC 117   MG 1.9   PHOS 4.1       Imaging Results Review: No pertinent imaging studies reviewed.  Other Study Results Review: No additional pertinent studies reviewed.    VTE Pharmacologic Prophylaxis: VTE covered by:  heparin (porcine), Subcutaneous, 5,000 Units at 01/02/25 0607      VTE Mechanical Prophylaxis: sequential compression device

## 2025-01-01 NOTE — PROGRESS NOTES
Progress Note - Colorectal   Name: Darin Bullock 54 y.o. male I MRN: [id number]  [facility] Date of Admission: 12/31/2024   Date of Service: 1/1/2025 I Hospital Day: 1    Assessment & Plan  Adenocarcinoma of sigmoid colon (HCC)  s/p 12/31 bilateral ureteral stent placement, laparoscopic hand-assisted closure colostomy by Dr. Llanos      -Clear liquid diet, will advance toast and crackers  -Keep Mosquera for now given PAKO, continue resuscitative fluids  -PCA Dilaudid pump for pain control with additional multimodal regimen  -As needed antiemetics  -SQH for DVT prophylaxis        Subjective   Pain controlled.  No nausea vomiting.  No flatus no bowel events.    Objective :  Temp:  [97.3 °F (36.3 °C)-99 °F (37.2 °C)] 98.6 °F (37 °C)  HR:  [] 88  BP: ()/(60-86) 117/78  Resp:  [12-51] 15  SpO2:  [89 %-100 %] 92 %  O2 Device: None (Room air)  Nasal Cannula O2 Flow Rate (L/min):  [2 L/min] 2 L/min    I/O         12/30 0701  12/31 0700 12/31 0701  01/01 0700 01/01 0701  01/02 0700    I.V. (mL/kg)  3142.3 (37.5)     IV Piggyback  100     Total Intake(mL/kg)  3242.3 (38.6)     Urine (mL/kg/hr)  1150 (0.6)     Total Output  1150     Net  +2092.3                  Lines/Drains/Airways       Active Status       Name Placement date Placement time Site Days    Urethral Catheter Latex 16 Fr. 12/31/24  0810  Latex  less than 1                  Physical Exam  Vitals and nursing note reviewed.   Constitutional:       General: He is not in acute distress.     Appearance: He is well-developed.   HENT:      Head: Normocephalic and atraumatic.   Eyes:      Conjunctiva/sclera: Conjunctivae normal.   Cardiovascular:      Rate and Rhythm: Normal rate and regular rhythm.      Heart sounds: No murmur heard.  Pulmonary:      Effort: Pulmonary effort is normal. No respiratory distress.      Breath sounds: Normal breath sounds.   Abdominal:      Palpations: Abdomen is soft.      Tenderness: There is no abdominal tenderness.    Musculoskeletal:         General: No swelling.      Cervical back: Neck supple.   Skin:     General: Skin is warm and dry.      Capillary Refill: Capillary refill takes less than 2 seconds.   Neurological:      Mental Status: He is alert.   Psychiatric:         Mood and Affect: Mood normal.           Lab Results: I have reviewed the following results:  Recent Labs     01/01/25  0554   WBC 12.95*   HGB 13.9   HCT 42.3      SODIUM 135   K 4.2      CO2 28   BUN 26*   CREATININE 1.47*   GLUC 117   MG 1.9   PHOS 4.1

## 2025-01-01 NOTE — OCCUPATIONAL THERAPY NOTE
"    Occupational Therapy Evaluation     Patient Name: Darin Bullock  Today's Date: 1/1/2025  Problem List  Principal Problem:    Adenocarcinoma of sigmoid colon (HCC)    Past Medical History  Past Medical History:   Diagnosis Date    Cancer (HCC)     colon cancer    GERD (gastroesophageal reflux disease)      Past Surgical History  Past Surgical History:   Procedure Laterality Date    COLECTOMY LAPAROSCOPIC N/A 10/29/2024    Procedure: LEFT EXTENDED COLECTOMY LAPAROSCOPIC- HAND ASSISTED, CREASTION DESENDING COLOSTOMY;  Surgeon: EVERTON Llanos MD;  [facility];  Service: Colorectal    IN SIGMOIDOSCOPY FLX DX W/COLLJ SPEC BR/WA IF PFRMD N/A 10/29/2024    Procedure: SIGMOIDOSCOPY FLEXIBLE;  Surgeon: EVERTON Llanos MD;  [facility];  Service: Colorectal         01/01/25 1005   OT Last Visit   OT Visit Date 01/01/25   Note Type   Note type Evaluation   Pain Assessment   Pain Assessment Tool 0-10   Pain Score 8   Pain Location/Orientation Location: Abdomen   Hospital Pain Intervention(s) Repositioned;Ambulation/increased activity;Emotional support  (worse w/ movement)   Restrictions/Precautions   Weight Bearing Precautions Per Order No   Other Precautions Multiple lines;Telemetry;Pain  (PCA pump, cathether)   Home Living   Type of Home House   Home Layout Two level   Bathroom Shower/Tub Tub/shower unit   Bathroom Toilet Standard   Prior Function   Level of [place] Independent with ADLs   Lives With Significant other   Receives Help From Family   IADLs Independent with driving   Falls in the last 6 months 0   Vocational Part time employment   Lifestyle   Autonomy pta pt reports I in ADLs/IADLS/functional mobility   Reciprocal Relationships supportive SO   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   Intrinsic Gratification playing music   Subjective   Subjective \"It will hurt worse once I get up\"   ADL   Where Assessed Chair   Eating Assistance 5  Supervision/Setup   Grooming Assistance 5  " Supervision/Setup   UB Bathing Assistance 5  Supervision/Setup   LB Bathing Assistance 5  Supervision/Setup   UB Dressing Assistance 5  Supervision/Setup   LB Dressing Assistance 5  Supervision/Setup   Toileting Assistance  5  Supervision/Setup   Transfers   Sit to Stand 5  Supervision   Stand to Sit 5  Supervision   Functional Mobility   Functional Mobility 5  Supervision   Additional items Rolling walker   Balance   Static Sitting Good   Dynamic Sitting Fair +   Static Standing Fair   Dynamic Standing Fair   Ambulatory Fair -   Activity Tolerance   Activity Tolerance Patient limited by pain   Medical Staff Made Aware PT shanae 2* medical complexity/comorbidities   Nurse Made Aware okay to see per RN   RUE Assessment   RUE Assessment WFL   LUE Assessment   LUE Assessment WFL   Hand Function   Gross Motor Coordination Functional   Fine Motor Coordination Functional   Psychosocial   Psychosocial (WDL) WDL   Cognition   Overall Cognitive Status WFL   Arousal/Participation Cooperative   Attention Within functional limits   Orientation Level Oriented X4   Memory Within functional limits   Following Commands Follows all commands and directions without difficulty   Comments pt pleasant and cooperative   Assessment   Assessment Pt is a 54 y.o. YO  male admitted to [facility] on 12/31/2024 w/ b/l ureteral stent placement and colostomy reversal. Pt  has a past medical history of Cancer (HCC) and GERD (gastroesophageal reflux disease). Pt with active OT orders . Pt resides in a house with significant other. Pt was I w/  ADLS and IADLS, (+) drove, & required no use of DME PTA. Currently pt is supervision for ADLs/functional mobility. Pt is limited at this time 2*: endurance, activity tolerance, functional mobility, and decreased I w/ ADLS/IADLS.The following Occupational Performance Areas to address include: functional mobility, community mobility, household maintenance, and job performance/volunteering. Based on the aforementioned OT  evaluation, functional performance deficits, and assessments, pt has been identified as a moderate complexity evaluation. From OT standpoint, anticipate d/c home with family support. Recommend continued participation in ADLs and functional mobility w/ staff. No further acute OT needs, d/c OT. Please re-consult if necessary.   Goals   Patient Goals to have less pain   Discharge Recommendation   Rehab Resource Intensity Level, OT No post-acute rehabilitation needs   AM-PAC Daily Activity Inpatient   Lower Body Dressing 3   Bathing 3   Toileting 4   Upper Body Dressing 4   Grooming 4   Eating 4   Daily Activity Raw Score 22   Daily Activity Standardized Score (Calc for Raw Score >=11) 47.1   AM-PAC Applied Cognition Inpatient   Following a Speech/Presentation 4   Understanding Ordinary Conversation 4   Taking Medications 4   Remembering Where Things Are Placed or Put Away 4   Remembering List of 4-5 Errands 4   Taking Care of Complicated Tasks 4   Applied Cognition Raw Score 24   Applied Cognition Standardized Score 62.21       Jossie Lee MS, OTR/L

## 2025-01-01 NOTE — ASSESSMENT & PLAN NOTE
s/p 12/31 bilateral ureteral stent placement, laparoscopic hand-assisted closure colostomy by Dr. Llanos      -Clear liquid diet, will advance toast and crackers  -Keep Mosquera for now given PAKO, continue resuscitative fluids  -PCA Dilaudid pump for pain control with additional multimodal regimen  -As needed antiemetics  -SQH for DVT prophylaxis

## 2025-01-01 NOTE — PHYSICAL THERAPY NOTE
Physical Therapy Evaluation    Patient's Name: Darin Bullock    Admitting Diagnosis  Adenocarcinoma of sigmoid colon (HCC) [C18.7]    Problem List  Patient Active Problem List   Diagnosis    Constipation    SIRS (systemic inflammatory response syndrome) (HCC)    Elevated serum creatinine    Nausea and vomiting    Prolonged Q-T interval on ECG    GERD (gastroesophageal reflux disease)    Bowel obstruction (HCC)    Adenocarcinoma of sigmoid colon (HCC)       Past Medical History  Past Medical History:   Diagnosis Date    Cancer (HCC)     colon cancer    GERD (gastroesophageal reflux disease)        Past Surgical History  Past Surgical History:   Procedure Laterality Date    COLECTOMY LAPAROSCOPIC N/A 10/29/2024    Procedure: LEFT EXTENDED COLECTOMY LAPAROSCOPIC- HAND ASSISTED, CREASTION DESENDING COLOSTOMY;  Surgeon: EVERTON Llanos MD;  [facility];  Service: Colorectal    CO SIGMOIDOSCOPY FLX DX W/COLLJ SPEC BR/WA IF PFRMD N/A 10/29/2024    Procedure: SIGMOIDOSCOPY FLEXIBLE;  Surgeon: EVERTON Llanos MD;  [facility];  Service: Colorectal        01/01/25 1006   PT Last Visit   PT Visit Date 01/01/25   Note Type   Note type Evaluation   Pain Assessment   Pain Assessment Tool 0-10   Pain Score 8   Pain Location/Orientation Location: Abdomen  (+ B shoulders)   Hospital Pain Intervention(s) Repositioned;Ambulation/increased activity;Emotional support;Cold applied   Restrictions/Precautions   Weight Bearing Precautions Per Order No   Other Precautions Pain;Multiple lines  (PCEA)   Home Living   Type of Home House   Home Layout Two level  (0 CAPRICE)   Bathroom Shower/Tub Tub/shower unit   Bathroom Toilet Standard   Prior Function   Level of [place] Independent with ADLs;Independent with functional mobility;Independent with IADLS  (I w/ ambulation w/o AD)   Lives With Significant other   Receives Help From Family   IADLs Independent with driving   Falls in the last 6 months 0   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   RLE Assessment   RLE Assessment WFL   LLE Assessment   LLE Assessment WFL   Coordination   Movements are Fluid and Coordinated 1   Bed Mobility   Supine to Sit 5  Supervision   Additional items HOB elevated   Additional Comments Pt greeted in supine. Instructed pt in log roll technique.   Transfers   Sit to Stand 5  Supervision   Stand to Sit 5  Supervision   Additional Comments RW   Ambulation/Elevation   Gait pattern Excessively slow;Short stride   Gait Assistance 5  Supervision   Assistive Device Rolling walker   Distance 75'x2   Stair Management Assistance 5  Supervision   Stair Management Technique One rail L;Reciprocal   Number of Stairs 4  (limited by length of lines)   Balance   Static Sitting Good   Dynamic Sitting Fair +   Static Standing Fair   Dynamic Standing Fair   Ambulatory Fair -  (RW)   Endurance Deficit   Endurance Deficit Yes   Endurance Deficit Description pain   Activity Tolerance   Activity Tolerance Patient tolerated treatment well;Patient limited by pain   Medical Staff Made Aware OT oJssie   Nurse Made Aware yes - cleared for therapy   Assessment   Assessment Pt is 54 y.o. male seen for a PT evaluation s/p admit to [facility] on 12/31/2024. Pt presenting w/ adenocarcinoma of sigmoid colon; pt is s/p B insertion ureteral catheters, closure colostomy. Please see above for other active problem list / PMH.     PT now consulted to assess functional mobility and needs for safe d/c planning. Prior to admission, pt was I w/ ambulation w/o AD, lives w/ s/o in a house w/ stairs.     Currently pt is S for bed skills; S for functional transfers; S for ambulation w/ RW; S for stair negotiation. Pt presents near functional baseline.      Encouraged pt to mobilize at least 3x/day while in-house to prevent functional decline. No further skilled acute PT needs. Will d/c from caseload.   Barriers to Discharge Inaccessible home environment   Goals   Patient Goals less pain   Plan   PT Frequency   (d/c PT)   Discharge Recommendation   Rehab Resource Intensity Level, PT No post-acute rehabilitation needs   AM-PAC Basic Mobility Inpatient   Turning in Flat Bed Without Bedrails 4   Lying on Back to Sitting on Edge of Flat Bed Without Bedrails 3   Moving Bed to Chair 3   Standing Up From Chair Using Arms 3   Walk in Room 3   Climb 3-5 Stairs With Railing 3   Basic Mobility Inpatient Raw Score 19   Basic Mobility Standardized Score 42.48   [facility] Highest Level Of Mobility   -HLM Goal 6: Walk 10 steps or more   -HLM Achieved 7: Walk 25 feet or more   End of Consult   Patient Position at End of Consult Bedside chair;All needs within reach  (on waffle cushion, all lines in tact, CP to B shoulders)     Kisha Herman, PT, DPT

## 2025-01-02 LAB
ANION GAP SERPL CALCULATED.3IONS-SCNC: 7 MMOL/L (ref 4–13)
BASOPHILS # BLD AUTO: 0.01 THOUSANDS/ΜL (ref 0–0.1)
BASOPHILS NFR BLD AUTO: 0 % (ref 0–1)
BUN SERPL-MCNC: 19 MG/DL (ref 5–25)
CALCIUM SERPL-MCNC: 8.6 MG/DL (ref 8.4–10.2)
CHLORIDE SERPL-SCNC: 102 MMOL/L (ref 96–108)
CO2 SERPL-SCNC: 29 MMOL/L (ref 21–32)
CREAT SERPL-MCNC: 1.1 MG/DL (ref 0.6–1.3)
EOSINOPHIL # BLD AUTO: 0.01 THOUSAND/ΜL (ref 0–0.61)
EOSINOPHIL NFR BLD AUTO: 0 % (ref 0–6)
ERYTHROCYTE [DISTWIDTH] IN BLOOD BY AUTOMATED COUNT: 12.9 % (ref 11.6–15.1)
GFR SERPL CREATININE-BSD FRML MDRD: 74 ML/MIN/1.73SQ M
GLUCOSE SERPL-MCNC: 98 MG/DL (ref 65–140)
HCT VFR BLD AUTO: 37.5 % (ref 36.5–49.3)
HGB BLD-MCNC: 12.2 G/DL (ref 12–17)
IMM GRANULOCYTES # BLD AUTO: 0.03 THOUSAND/UL (ref 0–0.2)
IMM GRANULOCYTES NFR BLD AUTO: 0 % (ref 0–2)
LYMPHOCYTES # BLD AUTO: 0.95 THOUSANDS/ΜL (ref 0.6–4.47)
LYMPHOCYTES NFR BLD AUTO: 11 % (ref 14–44)
MAGNESIUM SERPL-MCNC: 2.3 MG/DL (ref 1.9–2.7)
MCH RBC QN AUTO: 30.4 PG (ref 26.8–34.3)
MCHC RBC AUTO-ENTMCNC: 32.5 G/DL (ref 31.4–37.4)
MCV RBC AUTO: 94 FL (ref 82–98)
MONOCYTES # BLD AUTO: 1.01 THOUSAND/ΜL (ref 0.17–1.22)
MONOCYTES NFR BLD AUTO: 11 % (ref 4–12)
NEUTROPHILS # BLD AUTO: 6.94 THOUSANDS/ΜL (ref 1.85–7.62)
NEUTS SEG NFR BLD AUTO: 78 % (ref 43–75)
NRBC BLD AUTO-RTO: 0 /100 WBCS
PLATELET # BLD AUTO: 184 THOUSANDS/UL (ref 149–390)
PMV BLD AUTO: 9.6 FL (ref 8.9–12.7)
POTASSIUM SERPL-SCNC: 3.8 MMOL/L (ref 3.5–5.3)
RBC # BLD AUTO: 4.01 MILLION/UL (ref 3.88–5.62)
SODIUM SERPL-SCNC: 138 MMOL/L (ref 135–147)
WBC # BLD AUTO: 8.95 THOUSAND/UL (ref 4.31–10.16)

## 2025-01-02 PROCEDURE — 80048 BASIC METABOLIC PNL TOTAL CA: CPT

## 2025-01-02 PROCEDURE — 99024 POSTOP FOLLOW-UP VISIT: CPT | Performed by: COLON & RECTAL SURGERY

## 2025-01-02 PROCEDURE — 85025 COMPLETE CBC W/AUTO DIFF WBC: CPT

## 2025-01-02 PROCEDURE — 83735 ASSAY OF MAGNESIUM: CPT

## 2025-01-02 RX ORDER — SODIUM CHLORIDE, SODIUM LACTATE, POTASSIUM CHLORIDE, CALCIUM CHLORIDE 600; 310; 30; 20 MG/100ML; MG/100ML; MG/100ML; MG/100ML
50 INJECTION, SOLUTION INTRAVENOUS CONTINUOUS
Status: DISCONTINUED | OUTPATIENT
Start: 2025-01-02 | End: 2025-01-03

## 2025-01-02 RX ORDER — OXYCODONE HYDROCHLORIDE 10 MG/1
10 TABLET ORAL EVERY 4 HOURS PRN
Refills: 0 | Status: DISCONTINUED | OUTPATIENT
Start: 2025-01-02 | End: 2025-01-03 | Stop reason: HOSPADM

## 2025-01-02 RX ORDER — ENOXAPARIN SODIUM 100 MG/ML
40 INJECTION SUBCUTANEOUS
Status: DISCONTINUED | OUTPATIENT
Start: 2025-01-02 | End: 2025-01-03 | Stop reason: HOSPADM

## 2025-01-02 RX ORDER — OXYCODONE HYDROCHLORIDE 5 MG/1
5 TABLET ORAL EVERY 4 HOURS PRN
Refills: 0 | Status: DISCONTINUED | OUTPATIENT
Start: 2025-01-02 | End: 2025-01-03 | Stop reason: HOSPADM

## 2025-01-02 RX ORDER — HYDROMORPHONE HCL/PF 1 MG/ML
0.5 SYRINGE (ML) INJECTION
Refills: 0 | Status: DISCONTINUED | OUTPATIENT
Start: 2025-01-02 | End: 2025-01-03 | Stop reason: HOSPADM

## 2025-01-02 RX ORDER — POTASSIUM CHLORIDE 1500 MG/1
20 TABLET, EXTENDED RELEASE ORAL ONCE
Status: COMPLETED | OUTPATIENT
Start: 2025-01-02 | End: 2025-01-02

## 2025-01-02 RX ADMIN — POTASSIUM CHLORIDE 20 MEQ: 1500 TABLET, EXTENDED RELEASE ORAL at 09:21

## 2025-01-02 RX ADMIN — SODIUM CHLORIDE, SODIUM LACTATE, POTASSIUM CHLORIDE, AND CALCIUM CHLORIDE 50 ML/HR: .6; .31; .03; .02 INJECTION, SOLUTION INTRAVENOUS at 18:31

## 2025-01-02 RX ADMIN — SODIUM CHLORIDE, SODIUM LACTATE, POTASSIUM CHLORIDE, AND CALCIUM CHLORIDE 125 ML/HR: .6; .31; .03; .02 INJECTION, SOLUTION INTRAVENOUS at 06:11

## 2025-01-02 RX ADMIN — METHOCARBAMOL 500 MG: 500 TABLET ORAL at 21:01

## 2025-01-02 RX ADMIN — HEPARIN SODIUM 5000 UNITS: 5000 INJECTION, SOLUTION INTRAVENOUS; SUBCUTANEOUS at 06:07

## 2025-01-02 RX ADMIN — ACETAMINOPHEN 975 MG: 325 TABLET, FILM COATED ORAL at 23:04

## 2025-01-02 RX ADMIN — METHOCARBAMOL 500 MG: 500 TABLET ORAL at 15:07

## 2025-01-02 RX ADMIN — GABAPENTIN 100 MG: 100 CAPSULE ORAL at 15:07

## 2025-01-02 RX ADMIN — ENOXAPARIN SODIUM 40 MG: 40 INJECTION SUBCUTANEOUS at 09:11

## 2025-01-02 RX ADMIN — OXYCODONE HYDROCHLORIDE 10 MG: 10 TABLET ORAL at 09:20

## 2025-01-02 RX ADMIN — ACETAMINOPHEN 975 MG: 325 TABLET, FILM COATED ORAL at 06:07

## 2025-01-02 RX ADMIN — PANTOPRAZOLE SODIUM 40 MG: 40 TABLET, DELAYED RELEASE ORAL at 06:07

## 2025-01-02 RX ADMIN — ACETAMINOPHEN 975 MG: 325 TABLET, FILM COATED ORAL at 17:22

## 2025-01-02 RX ADMIN — GABAPENTIN 100 MG: 100 CAPSULE ORAL at 20:57

## 2025-01-02 RX ADMIN — ACETAMINOPHEN 975 MG: 325 TABLET, FILM COATED ORAL at 11:52

## 2025-01-02 RX ADMIN — METHOCARBAMOL 500 MG: 500 TABLET ORAL at 06:07

## 2025-01-02 RX ADMIN — GABAPENTIN 100 MG: 100 CAPSULE ORAL at 09:12

## 2025-01-02 NOTE — CASE MANAGEMENT
Case Management Assessment & Discharge Planning Note    Patient name Darin Bullock  Location [facility] 830/[facility] 830-01 MRN [id number]  : 1970 Date 2025       Current Admission Date: 2024  Current Admission Diagnosis:Adenocarcinoma of sigmoid colon (HCC)   Patient Active Problem List    Diagnosis Date Noted Date Diagnosed    Adenocarcinoma of sigmoid colon (HCC) 2024     Bowel obstruction (HCC) 10/25/2024     GERD (gastroesophageal reflux disease) 10/23/2024     Constipation 10/21/2024     SIRS (systemic inflammatory response syndrome) (HCC) 10/21/2024     Elevated serum creatinine 10/21/2024     Nausea and vomiting 10/21/2024     Prolonged Q-T interval on ECG 10/21/2024       LOS (days): 2  Geometric Mean LOS (GMLOS) (days):   Days to GMLOS:     OBJECTIVE:    Risk of Unplanned Readmission Score: 16.47         Current admission status: Inpatient       Preferred Pharmacy:   [id number] - [place], [place] - [address] [place] [address]  Phone: [phone] Fax: [phone]    [facility] - [place], [place] - [address] [facility]  [address] [facility]  [address]  [place] [address]  Phone: [phone] Fax: [phone]    Primary Care Provider: No primary care provider on file.    Primary Insurance: [employer]  Secondary Insurance:     ASSESSMENT:  Active Health Care Proxies    There are no active Health Care Proxies on file.                 Readmission Root Cause  30 Day Readmission: No    Patient Information  Admitted from:: Home  Mental Status: Alert  During Assessment patient was accompanied by: Spouse  Assessment information provided by:: Patient  Primary Caregiver: Self  Support Systems: Self, Spouse/significant other  County of Residence: [place]  What city do you live in?: [place]  Home entry access options. Select all that apply.: No steps to enter home  Type of Current Residence: 2 story home  Upon entering residence, is  there a bedroom on the main floor (no further steps)?: No  A bedroom is located on the following floor levels of residence (select all that apply):: 2nd Floor  Upon entering residence, is there a bathroom on the main floor (no further steps)?: No  Indicate which floors of current residence have a bathroom (select all the apply):: 2nd Floor  Number of steps to 2nd floor from main floor: One Flight  Living Arrangements: Lives w/ Spouse/significant other  Is patient a ?: No    Activities of Daily Living Prior to Admission  Functional Status: Independent  Completes ADLs independently?: Yes  Ambulates independently?: Yes  Does patient use assisted devices?: No  Does patient currently own DME?: No  Does patient have a history of Outpatient Therapy (PT/OT)?: No  Does the patient have a history of Short-Term Rehab?: No  Does patient have a history of HHC?: No  Does patient currently have HHC?: No         Patient Information Continued  Income Source: Employed  Does patient have prescription coverage?: Yes  Does patient receive dialysis treatments?: No  Does patient have a history of substance abuse?: No  Does patient have a history of Mental Health Diagnosis?: No         Means of Transportation  Means of Transport to Appts:: Drives Self          DISCHARGE DETAILS:    Discharge planning discussed with:: patient and spouse at bedside  Freedom of Choice: Yes     CM contacted family/caregiver?: No- see comments (pt AAOx3, family at bedside)  Were Treatment Team discharge recommendations reviewed with patient/caregiver?: Yes  Did patient/caregiver verbalize understanding of patient care needs?: Yes  Were patient/caregiver advised of the risks associated with not following Treatment Team discharge recommendations?: Yes    Contacts  Patient Contacts: Mary Ann Lizarraga  Relationship to Patient:: Family  Contact Method: Phone  Phone Number: [phone]  Reason/Outcome: Continuity of Care, Emergency Contact, Discharge  Planning                    CM introduced self and role to patient and spouse at bedside  Patient familiar to this CM from previous admission  Resides with spouse in 2SH ( FFOS to 2nd floor bedroom/bathroom)  IADLs and ambulation  No prior hx of HHC, STR, or OPPT  Drives, but spouse will assist with transport if needed  CM to follow- anticipate no dc needs    Patient/caregiver received discharge checklist.  Content reviewed.  Patient/caregiver encouraged to participate in discharge plan of care prior to discharge home.  CM reviewed d/c planning process including the following: identifying help at home, patient preference for d/c planning needs, Discharge Lounge, [facility] Meds to Bed program, availability of treatment team to discuss questions or concerns patient and/or family may have regarding understanding medications and recognizing signs and symptoms once discharged.  CM also encouraged patient to follow up with all recommended appointments after discharge. Patient advised of importance for patient and family to participate in managing patient’s medical well being.

## 2025-01-02 NOTE — PLAN OF CARE
Problem: PAIN - ADULT  Goal: Verbalizes/displays adequate comfort level or baseline comfort level  Description: Interventions:  - Encourage patient to monitor pain and request assistance  - Assess pain using appropriate pain scale  - Administer analgesics based on type and severity of pain and evaluate response  - Implement non-pharmacological measures as appropriate and evaluate response  - Consider cultural and social influences on pain and pain management  - Notify physician/advanced practitioner if interventions unsuccessful or patient reports new pain  Outcome: Progressing     Problem: INFECTION - ADULT  Goal: Absence or prevention of progression during hospitalization  Description: INTERVENTIONS:  - Assess and monitor for signs and symptoms of infection  - Monitor lab/diagnostic results  - Monitor all insertion sites, i.e. indwelling lines, tubes, and drains  - Monitor endotracheal if appropriate and nasal secretions for changes in amount and color  - [place] appropriate cooling/warming therapies per order  - Administer medications as ordered  - Instruct and encourage patient and family to use good hand hygiene technique  - Identify and instruct in appropriate isolation precautions for identified infection/condition  Outcome: Progressing     Problem: SAFETY ADULT  Goal: Patient will remain free of falls  Description: INTERVENTIONS:  - Educate patient/family on patient safety including physical limitations  - Instruct patient to call for assistance with activity   - Consult OT/PT to assist with strengthening/mobility   - Keep Call bell within reach  - Keep bed low and locked with side rails adjusted as appropriate  - Keep care items and personal belongings within reach  - Initiate and maintain comfort rounds  - Make Fall Risk Sign visible to staff  - Apply yellow socks and bracelet for high fall risk patients  - Consider moving patient to room near nurses station  Outcome: Progressing     Problem: Knowledge  Deficit  Goal: Patient/family/caregiver demonstrates understanding of disease process, treatment plan, medications, and discharge instructions  Description: Complete learning assessment and assess knowledge base.  Interventions:  - Provide teaching at level of understanding  - Provide teaching via preferred learning methods  Outcome: Progressing

## 2025-01-02 NOTE — PLAN OF CARE
Problem: PAIN - ADULT  Goal: Verbalizes/displays adequate comfort level or baseline comfort level  Description: Interventions:  - Encourage patient to monitor pain and request assistance  - Assess pain using appropriate pain scale  - Administer analgesics based on type and severity of pain and evaluate response  - Implement non-pharmacological measures as appropriate and evaluate response  - Consider cultural and social influences on pain and pain management  - Notify physician/advanced practitioner if interventions unsuccessful or patient reports new pain  Outcome: Progressing     Problem: INFECTION - ADULT  Goal: Absence or prevention of progression during hospitalization  Description: INTERVENTIONS:  - Assess and monitor for signs and symptoms of infection  - Monitor lab/diagnostic results  - Monitor all insertion sites, i.e. indwelling lines, tubes, and drains  - Monitor endotracheal if appropriate and nasal secretions for changes in amount and color  - [place] appropriate cooling/warming therapies per order  - Administer medications as ordered  - Instruct and encourage patient and family to use good hand hygiene technique  - Identify and instruct in appropriate isolation precautions for identified infection/condition  Outcome: Progressing     Problem: SAFETY ADULT  Goal: Patient will remain free of falls  Description: INTERVENTIONS:  - Educate patient/family on patient safety including physical limitations  - Instruct patient to call for assistance with activity   - Consult OT/PT to assist with strengthening/mobility   - Keep Call bell within reach  - Keep bed low and locked with side rails adjusted as appropriate  - Keep care items and personal belongings within reach  - Initiate and maintain comfort rounds  - Make Fall Risk Sign visible to staff  - Apply yellow socks and bracelet for high fall risk patients  - Consider moving patient to room near nurses station  Outcome: Progressing     Problem: DISCHARGE  PLANNING  Goal: Discharge to home or other facility with appropriate resources  Description: INTERVENTIONS:  - Identify barriers to discharge w/patient and caregiver  - Arrange for needed discharge resources and transportation as appropriate  - Identify discharge learning needs (meds, wound care, etc.)  - Arrange for interpretive services to assist at discharge as needed  - Refer to Case Management Department for coordinating discharge planning if the patient needs post-hospital services based on physician/advanced practitioner order or complex needs related to functional status, cognitive ability, or social support system  Outcome: Progressing     Problem: Knowledge Deficit  Goal: Patient/family/caregiver demonstrates understanding of disease process, treatment plan, medications, and discharge instructions  Description: Complete learning assessment and assess knowledge base.  Interventions:  - Provide teaching at level of understanding  - Provide teaching via preferred learning methods  Outcome: Progressing     Problem: GASTROINTESTINAL - ADULT  Goal: Minimal or absence of nausea and/or vomiting  Description: INTERVENTIONS:  - Administer IV fluids if ordered to ensure adequate hydration  - Maintain NPO status until nausea and vomiting are resolved  - Nasogastric tube if ordered  - Administer ordered antiemetic medications as needed  - Provide nonpharmacologic comfort measures as appropriate  - Advance diet as tolerated, if ordered  - Consider nutrition services referral to assist patient with adequate nutrition and appropriate food choices  Outcome: Progressing  Goal: Maintains or returns to baseline bowel function  Description: INTERVENTIONS:  - Assess bowel function  - Encourage oral fluids to ensure adequate hydration  - Administer IV fluids if ordered to ensure adequate hydration  - Administer ordered medications as needed  - Encourage mobilization and activity  - Consider nutritional services referral to  assist patient with adequate nutrition and appropriate food choices  Outcome: Progressing  Goal: Maintains adequate nutritional intake  Description: INTERVENTIONS:  - Monitor percentage of each meal consumed  - Identify factors contributing to decreased intake, treat as appropriate  - Assist with meals as needed  - Monitor I&O, weight, and lab values if indicated  - Obtain nutrition services referral as needed  Outcome: Progressing     Problem: METABOLIC, FLUID AND ELECTROLYTES - ADULT  Goal: Electrolytes maintained within normal limits  Description: INTERVENTIONS:  - Monitor labs and assess patient for signs and symptoms of electrolyte imbalances  - Administer electrolyte replacement as ordered  - Monitor response to electrolyte replacements, including repeat lab results as appropriate  - Instruct patient on fluid and nutrition as appropriate  Outcome: Progressing  Goal: Fluid balance maintained  Description: INTERVENTIONS:  - Monitor labs   - Monitor I/O and WT  - Instruct patient on fluid and nutrition as appropriate  - Assess for signs & symptoms of volume excess or deficit  Outcome: Progressing     Problem: SKIN/TISSUE INTEGRITY - ADULT  Goal: Incision(s), wounds(s) or drain site(s) healing without S/S of infection  Description: INTERVENTIONS  - Assess and document dressing, incision, wound bed, drain sites and surrounding tissue  - Provide patient and family education  - Perform skin care/dressing changes every shift  Outcome: Progressing     Problem: HEMATOLOGIC - ADULT  Goal: Maintains hematologic stability  Description: INTERVENTIONS  - Assess for signs and symptoms of bleeding or hemorrhage  - Monitor labs  - Administer supportive blood products/factors as ordered and appropriate  Outcome: Progressing

## 2025-01-03 VITALS
BODY MASS INDEX: 26.48 KG/M2 | DIASTOLIC BLOOD PRESSURE: 98 MMHG | HEIGHT: 70 IN | RESPIRATION RATE: 18 BRPM | SYSTOLIC BLOOD PRESSURE: 156 MMHG | TEMPERATURE: 98 F | OXYGEN SATURATION: 95 % | WEIGHT: 184.97 LBS | HEART RATE: 89 BPM

## 2025-01-03 LAB
ANION GAP SERPL CALCULATED.3IONS-SCNC: 8 MMOL/L (ref 4–13)
BUN SERPL-MCNC: 14 MG/DL (ref 5–25)
CALCIUM SERPL-MCNC: 9.1 MG/DL (ref 8.4–10.2)
CHLORIDE SERPL-SCNC: 101 MMOL/L (ref 96–108)
CO2 SERPL-SCNC: 30 MMOL/L (ref 21–32)
CREAT SERPL-MCNC: 1.18 MG/DL (ref 0.6–1.3)
GFR SERPL CREATININE-BSD FRML MDRD: 68 ML/MIN/1.73SQ M
GLUCOSE SERPL-MCNC: 105 MG/DL (ref 65–140)
PLATELET # BLD AUTO: 196 THOUSANDS/UL (ref 149–390)
PMV BLD AUTO: 9.5 FL (ref 8.9–12.7)
POTASSIUM SERPL-SCNC: 3.7 MMOL/L (ref 3.5–5.3)
SODIUM SERPL-SCNC: 139 MMOL/L (ref 135–147)

## 2025-01-03 PROCEDURE — 99024 POSTOP FOLLOW-UP VISIT: CPT | Performed by: COLON & RECTAL SURGERY

## 2025-01-03 PROCEDURE — 88305 TISSUE EXAM BY PATHOLOGIST: CPT | Performed by: PATHOLOGY

## 2025-01-03 PROCEDURE — 80048 BASIC METABOLIC PNL TOTAL CA: CPT | Performed by: PHYSICIAN ASSISTANT

## 2025-01-03 PROCEDURE — 85049 AUTOMATED PLATELET COUNT: CPT | Performed by: STUDENT IN AN ORGANIZED HEALTH CARE EDUCATION/TRAINING PROGRAM

## 2025-01-03 PROCEDURE — NC001 PR NO CHARGE: Performed by: COLON & RECTAL SURGERY

## 2025-01-03 PROCEDURE — 88304 TISSUE EXAM BY PATHOLOGIST: CPT | Performed by: PATHOLOGY

## 2025-01-03 RX ORDER — METOPROLOL TARTRATE 1 MG/ML
5 INJECTION, SOLUTION INTRAVENOUS EVERY 6 HOURS PRN
Status: DISCONTINUED | OUTPATIENT
Start: 2025-01-03 | End: 2025-01-03 | Stop reason: HOSPADM

## 2025-01-03 RX ORDER — LIDOCAINE 50 MG/G
1 PATCH TOPICAL DAILY
Status: DISCONTINUED | OUTPATIENT
Start: 2025-01-03 | End: 2025-01-03 | Stop reason: HOSPADM

## 2025-01-03 RX ORDER — OXYCODONE HYDROCHLORIDE 5 MG/1
5 TABLET ORAL EVERY 4 HOURS PRN
Qty: 15 TABLET | Refills: 0 | Status: SHIPPED | OUTPATIENT
Start: 2025-01-03 | End: 2025-01-08

## 2025-01-03 RX ORDER — POTASSIUM CHLORIDE 1500 MG/1
40 TABLET, EXTENDED RELEASE ORAL ONCE
Status: COMPLETED | OUTPATIENT
Start: 2025-01-03 | End: 2025-01-03

## 2025-01-03 RX ORDER — LIDOCAINE 50 MG/G
PATCH TOPICAL
Qty: 7 PATCH | Refills: 0 | Status: SHIPPED | OUTPATIENT
Start: 2025-01-03

## 2025-01-03 RX ORDER — METHOCARBAMOL 500 MG/1
500 TABLET, FILM COATED ORAL EVERY 8 HOURS SCHEDULED
Qty: 15 TABLET | Refills: 0 | Status: SHIPPED | OUTPATIENT
Start: 2025-01-03 | End: 2025-01-08

## 2025-01-03 RX ADMIN — PANTOPRAZOLE SODIUM 40 MG: 40 TABLET, DELAYED RELEASE ORAL at 05:04

## 2025-01-03 RX ADMIN — METHOCARBAMOL 500 MG: 500 TABLET ORAL at 05:04

## 2025-01-03 RX ADMIN — POTASSIUM CHLORIDE 40 MEQ: 1500 TABLET, EXTENDED RELEASE ORAL at 08:36

## 2025-01-03 RX ADMIN — ACETAMINOPHEN 975 MG: 325 TABLET, FILM COATED ORAL at 11:47

## 2025-01-03 RX ADMIN — LIDOCAINE 1 PATCH: 50 PATCH TOPICAL at 08:36

## 2025-01-03 RX ADMIN — METOPROLOL TARTRATE 5 MG: 1 INJECTION, SOLUTION INTRAVENOUS at 05:56

## 2025-01-03 RX ADMIN — ACETAMINOPHEN 975 MG: 325 TABLET, FILM COATED ORAL at 05:04

## 2025-01-03 RX ADMIN — METHOCARBAMOL 500 MG: 500 TABLET ORAL at 12:58

## 2025-01-03 RX ADMIN — ENOXAPARIN SODIUM 40 MG: 40 INJECTION SUBCUTANEOUS at 08:36

## 2025-01-03 RX ADMIN — GABAPENTIN 100 MG: 100 CAPSULE ORAL at 08:36

## 2025-01-03 NOTE — DISCHARGE SUMMARY
Discharge Summary - Surgery-General   Name: Darin Bullock 54 y.o. male I MRN: [id number]  Unit/Bed#: [facility] 830-01 I Date of Admission: 12/31/2024   Date of Service: 1/3/2025 I Hospital Day: 3      Admission Date: 12/31/2024 0549    Discharge Date: 01/03/25    Admitting Diagnosis: Adenocarcinoma of sigmoid colon (HCC) [C18.7]    Discharge Diagnosis: Sigmoid colon cancer, colostomy  Medical Problems       Resolved Problems  Date Reviewed: 10/21/2024   None         HPI: Darin is a 54-year-old gentleman who was found to have adenocarcinoma of his sigmoid colon.  In October 2024 patient underwent a laparoscopic extended left colectomy with a descending colostomy.  He has been doing well and is now here for surgical reversal of his colostomy.    Procedures Performed: 12/31/2024 laparoscopic hand-assisted colostomy reversal, preoperative ureteral stents and subsequent removal -Dr. Llanos    Summary of Hospital Course: Patient has done extremely well postoperatively.  He was able to start on clear liquids and is now tolerating a low residue diet.  Patient has been pain controlled throughout the admission.  He has been kept on Lovenox 40 mg subcu daily for DVT prophylaxis.  He is out of bed and ambulating the halls well.  He is using his incentive spirometer.  His colostomy closure site is clean and dry.  Patient may shower.  He will be discharged home today.  3 days of oxycodone was sent to [facility] pharmacy on [facility].  Along with Robaxin 500 mg every 6 hours for 5 days and no refills.  Patient is not to hesitate to call the office for fevers of 101.5 or higher, increasing abdominal pain or vomiting.  Patient will follow-up in Dr. Llanos's office in 2 weeks for staple removal.  Patient will see Dr. Llanos on 1/16/25 at 9:20 am at the [facility] office for staple removal.     Complications: None    Condition at Discharge: Good    Discharge instructions/Information to patient and family:   See After Visit Summary  (AVS) for information provided to patient and family.      Provisions for Follow-Up Care:  See after visit summary for information related to follow-up care and any pertinent home health orders.      PCP: No primary care provider on file.    Disposition: Home    Planned Readmission: No    Discharge Medications:  See after visit summary for reconciled discharge medications provided to patient and family.      Discharge Statement:  I have spent a total time of 25 minutes in caring for this patient on the day of the visit/encounter.

## 2025-01-03 NOTE — PLAN OF CARE
Problem: PAIN - ADULT  Goal: Verbalizes/displays adequate comfort level or baseline comfort level  Description: Interventions:  - Encourage patient to monitor pain and request assistance  - Assess pain using appropriate pain scale  - Administer analgesics based on type and severity of pain and evaluate response  - Implement non-pharmacological measures as appropriate and evaluate response  - Consider cultural and social influences on pain and pain management  - Notify physician/advanced practitioner if interventions unsuccessful or patient reports new pain  Outcome: Progressing     Problem: Knowledge Deficit  Goal: Patient/family/caregiver demonstrates understanding of disease process, treatment plan, medications, and discharge instructions  Description: Complete learning assessment and assess knowledge base.  Interventions:  - Provide teaching at level of understanding  - Provide teaching via preferred learning methods  Outcome: Progressing     Problem: SKIN/TISSUE INTEGRITY - ADULT  Goal: Incision(s), wounds(s) or drain site(s) healing without S/S of infection  Description: INTERVENTIONS  - Assess and document dressing, incision, wound bed, drain sites and surrounding tissue  - Provide patient and family education  Outcome: Progressing     Problem: HEMATOLOGIC - ADULT  Goal: Maintains hematologic stability  Description: INTERVENTIONS  - Assess for signs and symptoms of bleeding or hemorrhage  - Monitor labs  - Administer supportive blood products/factors as ordered and appropriate  Outcome: Progressing

## 2025-01-03 NOTE — PROGRESS NOTES
Progress Note - Colorectal   Name: Darin Bullock 54 y.o. male I MRN: [id number]  [facility] Date of Admission: 12/31/2024   Date of Service: 1/3/2025 I Hospital Day: 3    Assessment & Plan  Adenocarcinoma of sigmoid colon (HCC)  55 yo male with descending colostomy from extended left hemicolectomy for adenocarcinoma of sigmoid colon s/p Bilateral ureteral stent placement, laparoscopic hand-assisted closure colostomy 12/31.    Plan  -cont low res  -d/c IVFs  -Old ostomy site dressing changes per nursing  -Monitor strict I/O's  -Multimodal PO regimen  -Anti-emetic PRN  -SQH for DVT prophylaxis  -OOB/ambulation  -IS  -PT/OT: no needs  -Dispo planning        Subjective   No acute events overnight.  Patient has had multiple bowel movements.  Denies any nausea or vomiting.  Tolerating p.o.  Still some postop discomfort PRNs and lidocaine patch added.    Objective :  Temp:  [98 °F (36.7 °C)-98.8 °F (37.1 °C)] 98.8 °F (37.1 °C)  HR:  [88-99] 90  BP: (134-157)/() 157/101  Resp:  [17-19] 18  SpO2:  [88 %-93 %] 92 %  O2 Device: None (Room air)    I/O         01/01 0701  01/02 0700 01/02 0701  01/03 0700    P.O. 536 300    I.V. (mL/kg) 3230.8 (38.5) 529.6 (6.3)    IV Piggyback 90     Total Intake(mL/kg) 3856.8 (46) 829.6 (9.9)    Urine (mL/kg/hr) 1450 (0.7) 3160 (1.6)    Stool 0 0    Total Output 1450 3160    Net +2406.8 -2330.4          Unmeasured Urine Occurrence  2 x    Unmeasured Stool Occurrence  4 x            Physical Exam  Constitutional:       General: He is not in acute distress.     Appearance: Normal appearance. He is not toxic-appearing.   HENT:      Head: Normocephalic.      Mouth/Throat:      Mouth: Mucous membranes are moist.   Eyes:      Extraocular Movements: Extraocular movements intact.      Pupils: Pupils are equal, round, and reactive to light.   Cardiovascular:      Rate and Rhythm: Normal rate.   Pulmonary:      Effort: Pulmonary effort is normal.   Abdominal:      General: There is  no distension.      Palpations: Abdomen is soft. There is no mass.      Tenderness: There is abdominal tenderness. There is no guarding or rebound.      Comments: Post surgical tenderness, incisions c/d/i   Skin:     General: Skin is warm.   Neurological:      General: No focal deficit present.      Mental Status: He is alert and oriented to person, place, and time.           Lab Results: I have reviewed the following results:  Recent Labs     01/01/25  0554 01/02/25  0608 01/03/25  0510   WBC 12.95* 8.95  --    HGB 13.9 12.2  --    HCT 42.3 37.5  --     184 196   SODIUM 135 138 139   K 4.2 3.8 3.7    102 101   CO2 28 29 30   BUN 26* 19 14   CREATININE 1.47* 1.10 1.18   GLUC 117 98 105   MG 1.9 2.3  --    PHOS 4.1  --   --              VTE Pharmacologic Prophylaxis: VTE covered by:  enoxaparin, Subcutaneous, 40 mg at 01/02/25 0911      VTE Mechanical Prophylaxis: sequential compression device

## 2025-01-03 NOTE — ASSESSMENT & PLAN NOTE
53 yo male with descending colostomy from extended left hemicolectomy for adenocarcinoma of sigmoid colon s/p Bilateral ureteral stent placement, laparoscopic hand-assisted closure colostomy 12/31.    Plan  -cont low res  -d/c IVFs  -Old ostomy site dressing changes per nursing  -Monitor strict I/O's  -Multimodal PO regimen  -Anti-emetic PRN  -SQH for DVT prophylaxis  -OOB/ambulation  -IS  -PT/OT: no needs  -Dispo planning

## 2025-01-03 NOTE — UTILIZATION REVIEW
NOTIFICATION OF ADMISSION DISCHARGE   This is a Notification of Discharge from [facility]. Please be advised that this patient has been discharge from our facility. Below you will find the admission and discharge date and time including the patient’s disposition.   UTILIZATION REVIEW CONTACT:  Livan Willis  Utilization   Network Utilization Review Department  Phone: [phone] x carefully listen to the prompts. All voicemails are confidential.  Email: [email or web address]     ADMISSION INFORMATION  PRESENTATION DATE: 12/31/2024  5:49 AM  OBERVATION ADMISSION DATE: N/A  INPATIENT ADMISSION DATE: 12/31/24 11:32 AM   DISCHARGE DATE: 1/3/2025  1:48 PM   DISPOSITION:Home/Self Care    Network Utilization Review Department  ATTENTION: Please call with any questions or concerns to [phone] and carefully listen to the prompts so that you are directed to the right person. All voicemails are confidential.   For Discharge needs, contact Care Management DC Support Team at [phone] opt. 2  Send all requests for admission clinical reviews, approved or denied determinations and any other requests to dedicated fax number below belonging to the campus where the patient is receiving treatment. List of dedicated fax numbers for the Facilities:  FACILITY NAME UR FAX NUMBER   ADMISSION DENIALS (Administrative/Medical Necessity) [phone]   DISCHARGE SUPPORT TEAM ([facility]) [phone]   PARENT CHILD HEALTH (Maternity/NICU/Pediatrics) [phone]   General acute hospital [phone]   [facility] [phone]   [facility] [phone]   [facility] [phone]   [facility] [phone]   [facility] [phone]   [facility] [phone]   [facility] [phone]    [facility] [phone]   [facility] [phone]   [facility] [phone]   [facility] [phone]

## 2025-01-06 ENCOUNTER — RESULTS FOLLOW-UP (OUTPATIENT)
Dept: OTHER | Facility: HOSPITAL | Age: 55
End: 2025-01-06

## 2025-01-06 NOTE — TELEPHONE ENCOUNTER
Spoke with patient and advised him of the pathology results. The polyps were benign.  The surgical specimen from takedown of his colostomy had an additional lymph node that had cancer within it.  This needs to be discussed with the oncologist.  He should go on to his chemotherapy at approximately 3 weeks after his takedown of colostomy.  He needs an office visit in the next few weeks

## 2025-01-06 NOTE — TELEPHONE ENCOUNTER
Pt returning call. Transferred to [facility] in triage for further assistance.

## 2025-01-13 NOTE — PROGRESS NOTES
Colon and Rectal Surgery   Darin Bullock 54 y.o. male MRN [id number]  Encounter: [id number]  01/13/25 9:21 AM            Assessment: Darin Bullock is a 54 y.o. male who ***      Plan:   No problem-specific Assessment & Plan notes found for this encounter.      Subjective       Darin Bullock is a 54 y.o. male who is here today for a staple removal. Last office visit was 12/11/24 for Adenocarcinoma of sigmoid colon.   The patient is status post laparoscopic hand assisted left extended colectomy, mobilization of splenic flexure, creation of descending colostomy on 10/29/2024      Patient was admitted to [facility] for 3 days at that time a hand assisted laparoscopic colostomy reversal, preoperative ureteral stents and subsequent removal, on 12/31/24.   Lab Results   Component Value Date    WBC 8.95 01/02/2025    HGB 12.2 01/02/2025    HCT 37.5 01/02/2025    MCV 94 01/02/2025     01/03/2025     Lab Results   Component Value Date    SODIUM 139 01/03/2025    K 3.7 01/03/2025     01/03/2025    CO2 30 01/03/2025    BUN 14 01/03/2025    CREATININE 1.18 01/03/2025    GLUC 105 01/03/2025    CALCIUM 9.1 01/03/2025       Historical Information   Past Medical History:   Diagnosis Date    Cancer (HCC)     colon cancer    GERD (gastroesophageal reflux disease)      Past Surgical History:   Procedure Laterality Date    COLECTOMY LAPAROSCOPIC N/A 10/29/2024    Procedure: LEFT EXTENDED COLECTOMY LAPAROSCOPIC- HAND ASSISTED, CREASTION DESENDING COLOSTOMY;  Surgeon: EVERTON Llanos MD;  Location: [facility];  Service: Colorectal    [place] LAPS CLSR NTRSTM LG/SM INT W/RESCJ & ANASTOMOSIS N/A 12/31/2024    Procedure: CLOSURE COLOSTOMY, LAPAROSCOPIC;  Surgeon: EVERTON Llanos MD;  Location: [facility];  Service: Colorectal    [place] SIGMOIDOSCOPY FLX DX W/COLLJ SPEC BR/WA IF PFRMD N/A 10/29/2024    Procedure: SIGMOIDOSCOPY FLEXIBLE;  Surgeon: EVERTON Llanos MD;  Location: [facility];  Service: Colorectal        Meds/Allergies       Current Outpatient Medications:     acetaminophen (TYLENOL) 650 mg CR tablet, Take 650 mg by mouth every 8 (eight) hours as needed for mild pain Typically uses BID per pt, Disp: , Rfl:     lidocaine (LIDODERM) 5 %, Apply patch to abdomen near surgical site, NOT on site. 12 hrs on, 12 hrs off daily, Disp: 7 patch, Rfl: 0    methocarbamol (ROBAXIN) 500 mg tablet, Take 1 tablet (500 mg total) by mouth every 8 (eight) hours for 5 days, Disp: 15 tablet, Rfl: 0    omeprazole (PriLOSEC) 40 MG capsule, Take 40 mg by mouth daily, Disp: , Rfl:   Allergies   Allergen Reactions    Pollen Extract Allergic Rhinitis       Social History   Social History     Substance and Sexual Activity   Drug Use Never    Comment: Denies any drug use per pt     Social History     Tobacco Use   Smoking Status Never    Passive exposure: Never   Smokeless Tobacco Never   Tobacco Comments    Denies any use per pt          Family History   Problem Relation Age of Onset    Anesthesia problems Mother          Review of Systems    Objective   Current Vitals:  There were no vitals filed for this visit.      Physical Exam

## 2025-01-14 NOTE — PROGRESS NOTES
Colon and Rectal Surgery   Darin Bullock 54 y.o. male MRN [id number]  Encounter: [id number]  01/14/25 10:16 AM            Assessment: Darin Bullock is a 54 y.o. male who ***      Plan:   No problem-specific Assessment & Plan notes found for this encounter.      Subjective     HPI    Darin Bullock is a 54 y.o. male with a personal history of obstructing sigmoid cancer,  stage II, T4a N0 M0, who presents for a post operative evaluation.     The patient is status post laparoscopic left extended colectomy, hand assisted creation of descending colostomy on 10/29/2024, and subsequent laparoscopic colostomy closure on 12/31/2024.      Surgical pathology 10/29/2024:   A. Sigmoid colon, resection:  - Moderately differentiated adenocarcinoma (6.8 cm), arising in a tubular adenoma.  - Tumor invades the visceral peritoneum (pT4).  - Lymphovascular and perineural invasion are present.  - All margins are negative for tumor.  - Twenty one lymph nodes, negative for malignancy (0/21).  - Two additional portions of colon with no pathologic abnormality.  ~The tumor cells are positive for CDX2, SATB2, consistent with colorectal primary.     Surgical pathology 12/31/2024:   A. Large intestine, colostomy, closure:  -Metastatic adenocarcinoma morphologically compatible with colon primary, involving 1 of 1 lymph nodes (1/1).  -Tumor measures up to 6mm in greatest dimension.  -Extranodal extension not identified.  -Colonic stoma with surface erosion, adhesions and foreign body giant cell reaction, consistent with colostomy site.        Historical Information   Past Medical History:   Diagnosis Date    Cancer (HCC)     colon cancer    GERD (gastroesophageal reflux disease)      Past Surgical History:   Procedure Laterality Date    COLECTOMY LAPAROSCOPIC N/A 10/29/2024    Procedure: LEFT EXTENDED COLECTOMY LAPAROSCOPIC- HAND ASSISTED, CREASTION DESENDING COLOSTOMY;  Surgeon: EVERTON Llanos MD;  Location: [facility];  Service: Colorectal     TN LAPS CLSR NTRSTM LG/SM INT W/RESCJ & ANASTOMOSIS N/A 12/31/2024    Procedure: CLOSURE COLOSTOMY, LAPAROSCOPIC;  Surgeon: EVERTON Llanos MD;  Location: [facility];  Service: Colorectal    TN SIGMOIDOSCOPY FLX DX W/COLLJ SPEC BR/WA IF PFRMD N/A 10/29/2024    Procedure: SIGMOIDOSCOPY FLEXIBLE;  Surgeon: EVERTON Llanos MD;  Location: [facility];  Service: Colorectal       Meds/Allergies       Current Outpatient Medications:     acetaminophen (TYLENOL) 650 mg CR tablet, Take 650 mg by mouth every 8 (eight) hours as needed for mild pain Typically uses BID per pt, Disp: , Rfl:     lidocaine (LIDODERM) 5 %, Apply patch to abdomen near surgical site, NOT on site. 12 hrs on, 12 hrs off daily, Disp: 7 patch, Rfl: 0    methocarbamol (ROBAXIN) 500 mg tablet, Take 1 tablet (500 mg total) by mouth every 8 (eight) hours for 5 days, Disp: 15 tablet, Rfl: 0    omeprazole (PriLOSEC) 40 MG capsule, Take 40 mg by mouth daily, Disp: , Rfl:   Allergies   Allergen Reactions    Pollen Extract Allergic Rhinitis       Social History   Social History     Substance and Sexual Activity   Drug Use Never    Comment: Denies any drug use per pt     Social History     Tobacco Use   Smoking Status Never    Passive exposure: Never   Smokeless Tobacco Never   Tobacco Comments    Denies any use per pt          Family History   Problem Relation Age of Onset    Anesthesia problems Mother          Review of Systems    Objective   Current Vitals:  There were no vitals filed for this visit.      Physical Exam

## 2025-01-14 NOTE — PROGRESS NOTES
Colon and Rectal Surgery   Darin Bullock 54 y.o. male MRN [id number]  Encounter: [id number]  01/16/25 9:31 AM            Assessment: Darin Bullock is a 54 y.o. male who had sigmoid cancer.      Plan:   Adenocarcinoma of sigmoid colon (HCC)  The patient presents for follow-up after stoma closure.  He is doing quite well.  His bowel function is normal.  The wounds are nicely healed.  Staples were removed.    We reviewed his pathology report that showed an additional cancer bearing a lymph node in the resected proximal bowel at the colostomy area.    He will follow with oncology for surveillance from this point forward.  I will see him in 1 year for colonoscopy.      Subjective     HPI     Darin Bullock is a 54 y.o. male with a personal history of obstructing sigmoid cancer,  stage II, T4a N0 M0, who presents for a post operative evaluation. Chemotherapy will start on Monday 1/20/25.     The patient is status post laparoscopic left extended colectomy, hand assisted creation of descending colostomy on 10/29/2024, and subsequent laparoscopic colostomy closure on 12/31/2024.       Surgical pathology 10/29/2024:   A. Sigmoid colon, resection:  - Moderately differentiated adenocarcinoma (6.8 cm), arising in a tubular adenoma.  - Tumor invades the visceral peritoneum (pT4).  - Lymphovascular and perineural invasion are present.  - All margins are negative for tumor.  - Twenty one lymph nodes, negative for malignancy (0/21).  - Two additional portions of colon with no pathologic abnormality.  ~The tumor cells are positive for CDX2, SATB2, consistent with colorectal primary.      Surgical pathology 12/31/2024:   A. Large intestine, colostomy, closure:  -Metastatic adenocarcinoma morphologically compatible with colon primary, involving 1 of 1 lymph nodes (1/1).  -Tumor measures up to 6mm in greatest dimension.  -Extranodal extension not identified.  -Colonic stoma with surface erosion, adhesions and foreign body giant cell  reaction, consistent with colostomy site.        Historical Information   Past Medical History:   Diagnosis Date    Cancer (HCC)     colon cancer    GERD (gastroesophageal reflux disease)      Past Surgical History:   Procedure Laterality Date    COLECTOMY LAPAROSCOPIC N/A 10/29/2024    Procedure: LEFT EXTENDED COLECTOMY LAPAROSCOPIC- HAND ASSISTED, CREASTION DESENDING COLOSTOMY;  Surgeon: EVERTON Llanos MD;  [facility];  Service: Colorectal    ID LAPS CLSR NTRSTM LG/SM INT W/RESCJ & ANASTOMOSIS N/A 12/31/2024    Procedure: CLOSURE COLOSTOMY, LAPAROSCOPIC;  Surgeon: EVERTON Llanos MD;  [facility];  Service: Colorectal    ID SIGMOIDOSCOPY FLX DX W/COLLJ SPEC BR/WA IF PFRMD N/A 10/29/2024    Procedure: SIGMOIDOSCOPY FLEXIBLE;  Surgeon: EVERTON Llanos MD;  [facility];  Service: Colorectal       Meds/Allergies       Current Outpatient Medications:     acetaminophen (TYLENOL) 650 mg CR tablet, Take 650 mg by mouth every 8 (eight) hours as needed for mild pain Typically uses BID per pt, Disp: , Rfl:     omeprazole (PriLOSEC) 40 MG capsule, Take 40 mg by mouth daily, Disp: , Rfl:     lidocaine (LIDODERM) 5 %, Apply patch to abdomen near surgical site, NOT on site. 12 hrs on, 12 hrs off daily (Patient not taking: Reported on 1/16/2025), Disp: 7 patch, Rfl: 0    methocarbamol (ROBAXIN) 500 mg tablet, Take 1 tablet (500 mg total) by mouth every 8 (eight) hours for 5 days, Disp: 15 tablet, Rfl: 0  Allergies   Allergen Reactions    Pollen Extract Allergic Rhinitis       Social History   Social History     Substance and Sexual Activity   Drug Use Never    Comment: Denies any drug use per pt     Social History     Tobacco Use   Smoking Status Never    Passive exposure: Never   Smokeless Tobacco Never   Tobacco Comments    Denies any use per pt          Family History   Problem Relation Age of Onset    Anesthesia problems Mother          Review of Systems    Objective   Current  "Vitals:  Vitals:    01/16/25 0919   Weight: 83.5 kg (184 lb)   Height: 5' 10\" (1.778 m)         Physical Exam  Constitutional:       Appearance: Normal appearance.   Abdominal:      General: There is no distension.      Palpations: There is no mass.      Tenderness: There is no abdominal tenderness.      Comments: Wounds well. Staples removed.   Neurological:      Mental Status: He is alert.                 "

## 2025-01-15 ENCOUNTER — TELEPHONE (OUTPATIENT)
Age: 55
End: 2025-01-15

## 2025-01-16 ENCOUNTER — OFFICE VISIT (OUTPATIENT)
Age: 55
End: 2025-01-16

## 2025-01-16 VITALS — BODY MASS INDEX: 26.34 KG/M2 | WEIGHT: 184 LBS | HEIGHT: 70 IN

## 2025-01-16 DIAGNOSIS — C18.7 ADENOCARCINOMA OF SIGMOID COLON (HCC): Primary | ICD-10-CM

## 2025-01-16 PROCEDURE — 99024 POSTOP FOLLOW-UP VISIT: CPT | Performed by: COLON & RECTAL SURGERY

## 2025-01-16 NOTE — ASSESSMENT & PLAN NOTE
The patient presents for follow-up after stoma closure.  He is doing quite well.  His bowel function is normal.  The wounds are nicely healed.  Staples were removed.    We reviewed his pathology report that showed an additional cancer bearing a lymph node in the resected proximal bowel at the colostomy area.    He will follow with oncology for surveillance from this point forward.  I will see him in 1 year for colonoscopy.

## 2025-01-27 ENCOUNTER — APPOINTMENT (OUTPATIENT)
Dept: LAB | Facility: CLINIC | Age: 55
End: 2025-01-27
Payer: COMMERCIAL

## 2025-01-27 DIAGNOSIS — C18.7 ADENOCARCINOMA OF SIGMOID COLON (HCC): ICD-10-CM

## 2025-01-27 LAB
ALBUMIN SERPL BCG-MCNC: 4.6 G/DL (ref 3.5–5)
ALP SERPL-CCNC: 54 U/L (ref 34–104)
ALT SERPL W P-5'-P-CCNC: 18 U/L (ref 7–52)
ANION GAP SERPL CALCULATED.3IONS-SCNC: 10 MMOL/L (ref 4–13)
AST SERPL W P-5'-P-CCNC: 17 U/L (ref 13–39)
BASOPHILS # BLD AUTO: 0.04 THOUSANDS/ΜL (ref 0–0.1)
BASOPHILS NFR BLD AUTO: 1 % (ref 0–1)
BILIRUB SERPL-MCNC: 0.52 MG/DL (ref 0.2–1)
BUN SERPL-MCNC: 14 MG/DL (ref 5–25)
CALCIUM SERPL-MCNC: 9.6 MG/DL (ref 8.4–10.2)
CHLORIDE SERPL-SCNC: 104 MMOL/L (ref 96–108)
CO2 SERPL-SCNC: 29 MMOL/L (ref 21–32)
CREAT SERPL-MCNC: 1.01 MG/DL (ref 0.6–1.3)
EOSINOPHIL # BLD AUTO: 0.2 THOUSAND/ΜL (ref 0–0.61)
EOSINOPHIL NFR BLD AUTO: 4 % (ref 0–6)
ERYTHROCYTE [DISTWIDTH] IN BLOOD BY AUTOMATED COUNT: 12.5 % (ref 11.6–15.1)
GFR SERPL CREATININE-BSD FRML MDRD: 82 ML/MIN/1.73SQ M
GLUCOSE P FAST SERPL-MCNC: 98 MG/DL (ref 65–99)
HCT VFR BLD AUTO: 45.1 % (ref 36.5–49.3)
HGB BLD-MCNC: 14.7 G/DL (ref 12–17)
IMM GRANULOCYTES # BLD AUTO: 0.02 THOUSAND/UL (ref 0–0.2)
IMM GRANULOCYTES NFR BLD AUTO: 0 % (ref 0–2)
LYMPHOCYTES # BLD AUTO: 1.31 THOUSANDS/ΜL (ref 0.6–4.47)
LYMPHOCYTES NFR BLD AUTO: 28 % (ref 14–44)
MCH RBC QN AUTO: 29.5 PG (ref 26.8–34.3)
MCHC RBC AUTO-ENTMCNC: 32.6 G/DL (ref 31.4–37.4)
MCV RBC AUTO: 91 FL (ref 82–98)
MONOCYTES # BLD AUTO: 0.52 THOUSAND/ΜL (ref 0.17–1.22)
MONOCYTES NFR BLD AUTO: 11 % (ref 4–12)
NEUTROPHILS # BLD AUTO: 2.53 THOUSANDS/ΜL (ref 1.85–7.62)
NEUTS SEG NFR BLD AUTO: 56 % (ref 43–75)
NRBC BLD AUTO-RTO: 0 /100 WBCS
PLATELET # BLD AUTO: 320 THOUSANDS/UL (ref 149–390)
PMV BLD AUTO: 9.2 FL (ref 8.9–12.7)
POTASSIUM SERPL-SCNC: 3.6 MMOL/L (ref 3.5–5.3)
PROT SERPL-MCNC: 7.5 G/DL (ref 6.4–8.4)
RBC # BLD AUTO: 4.98 MILLION/UL (ref 3.88–5.62)
SODIUM SERPL-SCNC: 143 MMOL/L (ref 135–147)
WBC # BLD AUTO: 4.62 THOUSAND/UL (ref 4.31–10.16)

## 2025-01-27 PROCEDURE — 85025 COMPLETE CBC W/AUTO DIFF WBC: CPT

## 2025-01-27 PROCEDURE — 80053 COMPREHEN METABOLIC PANEL: CPT

## 2025-01-28 ENCOUNTER — OFFICE VISIT (OUTPATIENT)
Dept: HEMATOLOGY ONCOLOGY | Facility: CLINIC | Age: 55
End: 2025-01-28
Payer: COMMERCIAL

## 2025-01-28 VITALS
SYSTOLIC BLOOD PRESSURE: 150 MMHG | RESPIRATION RATE: 16 BRPM | DIASTOLIC BLOOD PRESSURE: 90 MMHG | BODY MASS INDEX: 26.77 KG/M2 | WEIGHT: 187 LBS | HEIGHT: 70 IN | HEART RATE: 85 BPM | OXYGEN SATURATION: 95 % | TEMPERATURE: 98.6 F

## 2025-01-28 DIAGNOSIS — C18.7 ADENOCARCINOMA OF SIGMOID COLON (HCC): Primary | ICD-10-CM

## 2025-01-28 PROCEDURE — 99213 OFFICE O/P EST LOW 20 MIN: CPT | Performed by: INTERNAL MEDICINE

## 2025-01-28 NOTE — PROGRESS NOTES
Name: Darin Bullock      : 1970      MRN: [id number]  Encounter Provider: Sarah Aguilar MD  Encounter Date: 2025   Encounter department: [facility] [place]  :  Assessment & Plan  Adenocarcinoma of sigmoid colon (HCC)  54-year-old male with pathologic T4, pathologic N0 (0 of 21 lymph nodes) clinical M0, high risk stage IIB invasive moderately differentiated adenocarcinoma of the sigmoid colon, mismatch repair protein proficient (MMR-P), consistent with microsatellite stable colon cancer, presenting with colonic obstruction.  Initial pathologic diagnosis of moderately differentiated adenocarcinoma of the sigmoid colon with established at the time of colonoscopy and biopsy of obstructing mass involving the sigmoid colon on 2024.  The patient underwent R0 laparoscopic hand-assisted extended left hemicolectomy with mobilization of the splenic flexure of the colon and creation of a descending colostomy on 2024.  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  Patient is at high risk for presence of micrometastatic disease and progression into gross metastases, particularly within the first 2 to 3 years after surgical resection of the primary sigmoid colon cancer.  I  discussed with the patient benefits and risks of adjuvant systemic therapy options for stage IIB, high risk adenocarcinoma of the sigmoid colon including:     Fluoropyrimidine chemotherapy plus oxaliplatin including regimen such as modified infusional 5-fluorouracil, bolus 5-fluorouracil, leucovorin, and oxaliplatin (modified FOLFOX6)  Capecitabine plus oxaliplatin (CapeOx).  Single agent capecitabine   Single agent infusional 5-fluorouracil      Benefits of adjuvant chemotherapy include prolongation of overall survival, prolongation of relapse free survival, and delay in the development of gross metastatic disease.  I described to the patient potential toxicity associated with fluoropyrimidine's and oxaliplatin.  Benefits of adjuvant chemotherapy outweigh risks.  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       After careful consideration of different options for adjuvant chemotherapy for high risk stage IIB moderately differentiated adenocarcinoma of the sigmoid colon, MMR-P/DAO, I made a decision with the patient to proceed with adjuvant capecitabine 2000 mg/m²/day in 2 divided doses daily 1 week on 1 week of for 3 to 6 months (single agent capecitabine).  The patient will not receive adjuvant oxaliplatin.  Benefits of adjuvant capecitabine for high risk stage IIB adenocarcinoma of the sigmoid colon include prolongation of overall survival, prolongation of relapse free survival.  Patient has a clear understanding of potential adverse events that may be associated with capecitabine including hand-foot syndrome.  Benefits of adjuvant capecitabine outweigh any potential treatment related toxicity.      Interim assessment: The patient underwent reversal of colostomy on December 31, 2024. He initiated adjuvant single agent  capecitabine on January 20 2025.  So far, Mr. Bullock is tolerating adjuvant capecitabine well, without treatment related adverse events or serious adverse events.  He refers mild fatigue.  Otherwise, he does not have new complaints.     Plan:  Continue adjuvant capecitabine.  Duration of adjuvant chemotherapy is 6 months.  Subsequently, the patient will initiate surveillance for stage IIB invasive moderately differentiated adenocarcinoma of the sigmoid colon.  CBC with differential, CMP, and serum CEA on March 17, 2025  Return to medical oncology clinic for history and physical exam and for assessment of safety and tolerability of adjuvant capecitabine on March 18, 2025  Mr. Bullock understands and agrees with my management recommendations and plan of care. I answered questions to satisfaction.      History of Present Illness   Chief Complaint   Patient presents with    Follow-up     54 y.o. 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  Initial pathologic diagnosis of moderately differentiated adenocarcinoma of the sigmoid colon with established at the time of colonoscopy and biopsy of obstructing mass involving the sigmoid colon on October 24, 2024.  The patient underwent R0 laparoscopic hand-assisted extended left hemicolectomy with mobilization of the splenic flexure of the colon and creation of a descending colostomy on October 29, 2024.      In summary, Mr. Pena presented to the emergency department with acute colonic obstruction secondary to a mass present in the left sigmoid colon the third week of October 2024.  On October 21, 2024, contrast-enhanced CT scan of the abdomen and pelvis showed distended fluid-filled loops of small bowel.  On October 23, 2024,  non contrast CT scan of the chest showed a partially imaged colonic obstruction in the upper abdomen. There were no findings of metastatic disease in the chest. 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 The same day, serum CEA was 13.4 ng/mL. On October 24, 2024, colonoscopy showed a single malignant-appearing, fungating, invasive and ulcerated mass (not traversable) in the sigmoid colon, covering the whole circumference; performed cold forceps biopsy. There was a malignant-appearing stricture (not traversable) in the sigmoid colon. Pathology showed intramucosal adenocarcinoma arising in a tubular adenoma with extensive high grade dysplasia.  On October 24, 2024, the patient underwent placement of a sigmoid colonic metallic stent for treatment of colonic obstruction.  On October 29, 2024, the patient underwent R0 laparoscopic hand-assisted extended left hemicolectomy with mobilization of the splenic flexure, and creation of a descending colostomy. Pathology showed moderately differentiated adenocarcinoma (6.8 cm), arising in a tubular adenoma. Tumor invades the visceral peritoneum (pT4). Lymphovascular and perineural invasion are present. All margins are negative for tumor. Twenty one lymph nodes, negative for malignancy (0/21), pN0. The primary tumor is MMR-P consistent with a DAO colon cancer.    Interim history: The patient underwent reversal of colostomy on December 31, 2024. He initiated adjuvant single agent capecitabine on January 20 2025.  So far, Mr. Bullock is tolerating adjuvant capecitabine well, without treatment related adverse events or serious adverse events.  He refers mild fatigue.  Otherwise, he does not have new complaints.  He does not have new systemic, gastrointestinal, cardiorespiratory, genitourinary, musculoskeletal, dermatologic, or neurologic symptoms.     Oncology History   Cancer Staging   Adenocarcinoma of sigmoid colon (HCC)  Staging form:  Colon and Rectum, AJCC 8th Edition  - Pathologic stage from 11/18/2024: Stage IIB (pT4a, pN0, cM0) - Signed by Sarah Aguilar MD on 11/18/2024  Stage prefix: Initial diagnosis  Total positive nodes: 0  Total nodes examined: 21  Histologic grading system: 4 grade system  Histologic grade (G): G2  Residual tumor (R): R0 - None  Laterality: Left  Tumor size (mm): 68  Lymph-vascular invasion (LVI): LVI present/identified, NOS  Diagnostic confirmation: Positive histology  Specimen type: Excision  Staged by: Managing physician  Carcinoembryonic antigen (CEA) (ng/mL): 13.4  Perineural invasion (PNI): Present  Microsatellite instability (MSI): Stable  Stage used in treatment planning: Yes  National guidelines used in treatment planning: Yes  Oncology History   Adenocarcinoma of sigmoid colon (HCC)   11/18/2024 Initial Diagnosis    Adenocarcinoma of sigmoid colon (HCC)     11/18/2024 -  Cancer Staged    Staging form: Colon and Rectum, AJCC 8th Edition  - Pathologic stage from 11/18/2024: Stage IIB (pT4a, pN0, cM0) - Signed by Sarah Aguilar MD on 11/18/2024  Stage prefix: Initial diagnosis  Total positive nodes: 0  Total nodes examined: 21  Histologic grading system: 4 grade system  Histologic grade (G): G2  Residual tumor (R): R0 - None  Laterality: Left  Tumor size (mm): 68  Lymph-vascular invasion (LVI): LVI present/identified, NOS  Diagnostic confirmation: Positive histology  Specimen type: Excision  Staged by: Managing physician  Carcinoembryonic antigen (CEA) (ng/mL): 13.4  Perineural invasion (PNI): Present  Microsatellite instability (MSI): Stable  Stage used in treatment planning: Yes  National guidelines used in treatment planning: Yes          Pertinent Medical History   01/28/25:   Past Medical History:   Diagnosis Date    Cancer (HCC)     colon cancer    GERD (gastroesophageal reflux disease)      Review of Systems   Constitutional:  Positive for fatigue (Mild fatigue while taking capecitabine). Negative for  "activity change, appetite change, chills, diaphoresis, fever and unexpected weight change.   HENT:  Negative for congestion, dental problem, ear discharge, ear pain, facial swelling, hearing loss, mouth sores, nosebleeds, postnasal drip, rhinorrhea, sinus pain, sneezing, sore throat, tinnitus, trouble swallowing and voice change.    Eyes:  Negative for photophobia, pain, discharge, redness, itching and visual disturbance.   Respiratory:  Negative for apnea, cough, choking, chest tightness, shortness of breath, wheezing and stridor.    Cardiovascular:  Negative for chest pain, palpitations and leg swelling.   Gastrointestinal:  Negative for abdominal distention, abdominal pain, anal bleeding, blood in stool, constipation, diarrhea, nausea, rectal pain and vomiting.   Endocrine: Negative for cold intolerance and heat intolerance.   Genitourinary:  Negative for decreased urine volume, difficulty urinating, dysuria, enuresis, flank pain, frequency, hematuria and urgency.   Musculoskeletal:  Negative for arthralgias, back pain, gait problem, joint swelling, myalgias, neck pain and neck stiffness.   Skin:  Negative for color change, pallor, rash and wound.   Neurological:  Negative for dizziness, tremors, seizures, syncope, facial asymmetry, speech difficulty, weakness, light-headedness, numbness and headaches.   Hematological:  Negative for adenopathy. Does not bruise/bleed easily.   Psychiatric/Behavioral:  Negative for behavioral problems, confusion, dysphoric mood and sleep disturbance. The patient is not nervous/anxious.            Objective   /90 (BP Location: Left arm, Patient Position: Sitting, Cuff Size: Adult)   Pulse 85   Temp 98.6 °F (37 °C) (Temporal)   Resp 16   Ht 5' 10\" (1.778 m)   Wt 84.8 kg (187 lb)   SpO2 95%   BMI 26.83 kg/m²     Pain Screening:  Pain Score: 0-No pain  ECOG   0  Physical Exam  Constitutional:       Comments: Male patient well-developed well-nourished, without acute " respiratory distress   HENT:      Head: Normocephalic and atraumatic.      Nose: Nose normal.      Mouth/Throat:      Mouth: Mucous membranes are moist.      Pharynx: Oropharynx is clear.   Eyes:      Extraocular Movements: Extraocular movements intact.      Conjunctiva/sclera: Conjunctivae normal.      Pupils: Pupils are equal, round, and reactive to light.      Comments: No scleral icterus   Neck:      Comments: No cervical, supraclavicular, axillary, epitrochlear, or inguinal lymphadenopathy.   Cardiovascular:      Rate and Rhythm: Normal rate and regular rhythm.      Pulses: Normal pulses.      Heart sounds: Normal heart sounds.   Pulmonary:      Effort: Pulmonary effort is normal.      Breath sounds: Normal breath sounds.   Abdominal:      General: Bowel sounds are normal.      Palpations: Abdomen is soft.      Comments: Abdomen soft, nontender, nonpainful.  No hepatomegaly.  No splenomegaly.  No rebound tenderness.  No lateral or shifting dullness.  Bowel sounds present, normal.    Musculoskeletal:         General: Normal range of motion.      Cervical back: Normal range of motion and neck supple.   Skin:     General: Skin is warm and dry.      Capillary Refill: Capillary refill takes less than 2 seconds.   Neurological:      General: No focal deficit present.      Mental Status: He is alert and oriented to person, place, and time. Mental status is at baseline.   Psychiatric:         Mood and Affect: Mood normal.         Behavior: Behavior normal.         Thought Content: Thought content normal.         Judgment: Judgment normal.         Labs: I have reviewed the following labs:  Lab Results   Component Value Date/Time    WBC 4.62 01/27/2025 06:57 AM    RBC 4.98 01/27/2025 06:57 AM    Hemoglobin 14.7 01/27/2025 06:57 AM    Hematocrit 45.1 01/27/2025 06:57 AM    MCV 91 01/27/2025 06:57 AM    MCH 29.5 01/27/2025 06:57 AM    RDW 12.5 01/27/2025 06:57 AM    Platelets 320 01/27/2025 06:57 AM    Segmented % 56  01/27/2025 06:57 AM    Lymphocytes % 28 01/27/2025 06:57 AM    Monocytes % 11 01/27/2025 06:57 AM    Eosinophils Relative 4 01/27/2025 06:57 AM    Basophils Relative 1 01/27/2025 06:57 AM    Immature Grans % 0 01/27/2025 06:57 AM    Absolute Neutrophils 2.53 01/27/2025 06:57 AM     Lab Results   Component Value Date/Time    Potassium 3.6 01/27/2025 06:57 AM    Chloride 104 01/27/2025 06:57 AM    CO2 29 01/27/2025 06:57 AM    BUN 14 01/27/2025 06:57 AM    Creatinine 1.01 01/27/2025 06:57 AM    Glucose, Fasting 98 01/27/2025 06:57 AM    Calcium 9.6 01/27/2025 06:57 AM    AST 17 01/27/2025 06:57 AM    ALT 18 01/27/2025 06:57 AM    Alkaline Phosphatase 54 01/27/2025 06:57 AM    Total Protein 7.5 01/27/2025 06:57 AM    Albumin 4.6 01/27/2025 06:57 AM    Total Bilirubin 0.52 01/27/2025 06:57 AM    eGFR 82 01/27/2025 06:57 AM     Serum CEA levels:    Component      Latest Ref Rng 10/23/2024 1/27/2025   CARCINOEMBRYONIC ANTIGEN      0.0 - 3.0 ng/mL 13.4 (H)  1.9

## 2025-01-28 NOTE — ASSESSMENT & PLAN NOTE
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  Initial pathologic diagnosis of moderately differentiated adenocarcinoma of the sigmoid colon with established at the time of colonoscopy and biopsy of obstructing mass involving the sigmoid colon on October 24, 2024.  The patient underwent R0 laparoscopic hand-assisted extended left hemicolectomy with mobilization of the splenic flexure of the colon and creation of a descending colostomy on October 29, 2024.  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  Patient is at high risk for presence of micrometastatic disease and progression into gross metastases, particularly within the first 2 to 3 years after surgical resection of the primary sigmoid colon cancer.  I discussed with the patient benefits and risks of adjuvant systemic therapy options for stage IIB, high risk adenocarcinoma of the sigmoid colon including:     Fluoropyrimidine chemotherapy plus oxaliplatin including regimen such as modified infusional  5-fluorouracil, bolus 5-fluorouracil, leucovorin, and oxaliplatin (modified FOLFOX6)  Capecitabine plus oxaliplatin (CapeOx).  Single agent capecitabine   Single agent infusional 5-fluorouracil      Benefits of adjuvant chemotherapy include prolongation of overall survival, prolongation of relapse free survival, and delay in the development of gross metastatic disease.  I described to the patient potential toxicity associated with fluoropyrimidine's and oxaliplatin.  Benefits of adjuvant chemotherapy outweigh risks.  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       After careful consideration of different options for adjuvant chemotherapy for high risk stage IIB moderately differentiated adenocarcinoma of the sigmoid colon, MMR-P/DAO, I made a decision with the patient to proceed with adjuvant capecitabine 2000 mg/m²/day in 2 divided doses daily 1 week on 1 week of for 3 to 6 months (single agent capecitabine).  The patient will not receive adjuvant oxaliplatin.  Benefits of adjuvant capecitabine for high risk stage IIB adenocarcinoma of the sigmoid colon include prolongation of overall survival, prolongation of relapse free survival.  Patient has a clear understanding of potential adverse events that may be associated with capecitabine including hand-foot syndrome.  Benefits of adjuvant capecitabine outweigh any potential treatment related toxicity.      Interim assessment: The patient underwent reversal of colostomy on December 31, 2024. He initiated adjuvant single agent capecitabine on January 20 2025.  So far, Mr. Bullock is tolerating adjuvant capecitabine well, without treatment related adverse events or serious adverse events.  He refers mild fatigue.  Otherwise, he does not have new complaints.     Plan:  Continue  adjuvant capecitabine.  Duration of adjuvant chemotherapy is 6 months.  Subsequently, the patient will initiate surveillance for stage IIB invasive moderately differentiated adenocarcinoma of the sigmoid colon.  CBC with differential, CMP, and serum CEA on March 17, 2025  Return to medical oncology clinic for history and physical exam and for assessment of safety and tolerability of adjuvant capecitabine on March 18, 2025

## 2025-02-06 ENCOUNTER — PATIENT OUTREACH (OUTPATIENT)
Dept: HEMATOLOGY ONCOLOGY | Facility: CLINIC | Age: 55
End: 2025-02-06

## 2025-02-06 NOTE — PROGRESS NOTES
I reached out to Darin  now that he is on Tx to reassess for any barriers to care and offer any supportive services that may be needed. I left  with reason for my call including my direct phone number

## 2025-02-07 ENCOUNTER — PATIENT OUTREACH (OUTPATIENT)
Dept: HEMATOLOGY ONCOLOGY | Facility: CLINIC | Age: 55
End: 2025-02-07

## 2025-02-07 NOTE — PROGRESS NOTES
I reached out and spoke with Darin  to follow up and to review for any new changes in barriers to care and offer supportive services as needed.     Barriers noted previously and outcome of interventions;  None     Reviewed for any new barriers as noted below.    Are you having any side effects from your treatment?No    Are you eating and drinking normally? yes    Have you been experiencing any uncontrolled pain related to your cancer diagnosis? No    If not already established, have needs changed for a palliative care referral? no    Do you have a good support system? Yes     Are you interested in any support groups?  No     How are you doing with transportation to your appointments? Pt is able to drive     Do you have any questions or concerns regarding your treatment plan? No    Any new financial concerns for your household or medical bills? No    Do you know when your upcoming appointments are? yes  Future Appointments   Date Time Provider Department Center   3/18/2025  8:20 AM Sarah Aguilar MD HEM [facility]          Based on individual needs I will follow up with them in 4 weeks. I have provided my direct contact information and welcome them to contact me if their needs as discussed above change. They were appreciative for the call.

## 2025-02-20 ENCOUNTER — TELEPHONE (OUTPATIENT)
Age: 55
End: 2025-02-20

## 2025-02-20 NOTE — TELEPHONE ENCOUNTER
Pt has a recall placed for a 6 month follow up with EUN Herrera. Called and left message with pt to return call and schedule an appointment. Will call pt one more time and send letter if still not scheduled.

## 2025-03-03 DIAGNOSIS — C18.7 ADENOCARCINOMA OF SIGMOID COLON (HCC): Primary | ICD-10-CM

## 2025-03-03 RX ORDER — ONDANSETRON 8 MG/1
8 TABLET, ORALLY DISINTEGRATING ORAL EVERY 8 HOURS PRN
Qty: 30 TABLET | Refills: 1 | Status: SHIPPED | OUTPATIENT
Start: 2025-03-03

## 2025-03-16 ENCOUNTER — APPOINTMENT (OUTPATIENT)
Dept: LAB | Facility: CLINIC | Age: 55
End: 2025-03-16
Payer: COMMERCIAL

## 2025-03-16 DIAGNOSIS — C18.7 ADENOCARCINOMA OF SIGMOID COLON (HCC): ICD-10-CM

## 2025-03-16 LAB
ALBUMIN SERPL BCG-MCNC: 4.1 G/DL (ref 3.5–5)
ALP SERPL-CCNC: 64 U/L (ref 34–104)
ALT SERPL W P-5'-P-CCNC: 17 U/L (ref 7–52)
ANION GAP SERPL CALCULATED.3IONS-SCNC: 7 MMOL/L (ref 4–13)
AST SERPL W P-5'-P-CCNC: 17 U/L (ref 13–39)
BASOPHILS # BLD AUTO: 0.07 THOUSANDS/ÂΜL (ref 0–0.1)
BASOPHILS NFR BLD AUTO: 1 % (ref 0–1)
BILIRUB SERPL-MCNC: 0.75 MG/DL (ref 0.2–1)
BUN SERPL-MCNC: 21 MG/DL (ref 5–25)
CALCIUM SERPL-MCNC: 8.9 MG/DL (ref 8.4–10.2)
CEA SERPL-MCNC: 2 NG/ML (ref 0–3)
CHLORIDE SERPL-SCNC: 109 MMOL/L (ref 96–108)
CO2 SERPL-SCNC: 25 MMOL/L (ref 21–32)
CREAT SERPL-MCNC: 1.19 MG/DL (ref 0.6–1.3)
EOSINOPHIL # BLD AUTO: 0.23 THOUSAND/ÂΜL (ref 0–0.61)
EOSINOPHIL NFR BLD AUTO: 2 % (ref 0–6)
ERYTHROCYTE [DISTWIDTH] IN BLOOD BY AUTOMATED COUNT: 17.7 % (ref 11.6–15.1)
GFR SERPL CREATININE-BSD FRML MDRD: 67 ML/MIN/1.73SQ M
GLUCOSE P FAST SERPL-MCNC: 88 MG/DL (ref 65–99)
HCT VFR BLD AUTO: 39.4 % (ref 36.5–49.3)
HGB BLD-MCNC: 13.7 G/DL (ref 12–17)
IMM GRANULOCYTES # BLD AUTO: 0.04 THOUSAND/UL (ref 0–0.2)
IMM GRANULOCYTES NFR BLD AUTO: 0 % (ref 0–2)
LYMPHOCYTES # BLD AUTO: 1.47 THOUSANDS/ÂΜL (ref 0.6–4.47)
LYMPHOCYTES NFR BLD AUTO: 14 % (ref 14–44)
MCH RBC QN AUTO: 32.1 PG (ref 26.8–34.3)
MCHC RBC AUTO-ENTMCNC: 34.8 G/DL (ref 31.4–37.4)
MCV RBC AUTO: 92 FL (ref 82–98)
MONOCYTES # BLD AUTO: 1.16 THOUSAND/ÂΜL (ref 0.17–1.22)
MONOCYTES NFR BLD AUTO: 11 % (ref 4–12)
NEUTROPHILS # BLD AUTO: 7.27 THOUSANDS/ÂΜL (ref 1.85–7.62)
NEUTS SEG NFR BLD AUTO: 72 % (ref 43–75)
NRBC BLD AUTO-RTO: 0 /100 WBCS
PLATELET # BLD AUTO: 254 THOUSANDS/UL (ref 149–390)
PMV BLD AUTO: 9 FL (ref 8.9–12.7)
POTASSIUM SERPL-SCNC: 3.1 MMOL/L (ref 3.5–5.3)
PROT SERPL-MCNC: 7 G/DL (ref 6.4–8.4)
RBC # BLD AUTO: 4.27 MILLION/UL (ref 3.88–5.62)
SODIUM SERPL-SCNC: 141 MMOL/L (ref 135–147)
WBC # BLD AUTO: 10.24 THOUSAND/UL (ref 4.31–10.16)

## 2025-03-16 PROCEDURE — 85025 COMPLETE CBC W/AUTO DIFF WBC: CPT

## 2025-03-16 PROCEDURE — 80053 COMPREHEN METABOLIC PANEL: CPT

## 2025-03-16 PROCEDURE — 36415 COLL VENOUS BLD VENIPUNCTURE: CPT

## 2025-03-16 PROCEDURE — 82378 CARCINOEMBRYONIC ANTIGEN: CPT

## 2025-03-18 ENCOUNTER — OFFICE VISIT (OUTPATIENT)
Dept: HEMATOLOGY ONCOLOGY | Facility: CLINIC | Age: 55
End: 2025-03-18
Payer: COMMERCIAL

## 2025-03-18 VITALS
SYSTOLIC BLOOD PRESSURE: 160 MMHG | BODY MASS INDEX: 27.92 KG/M2 | DIASTOLIC BLOOD PRESSURE: 90 MMHG | OXYGEN SATURATION: 98 % | WEIGHT: 195 LBS | TEMPERATURE: 98 F | RESPIRATION RATE: 18 BRPM | HEART RATE: 87 BPM | HEIGHT: 70 IN

## 2025-03-18 DIAGNOSIS — E87.6 HYPOKALEMIA: ICD-10-CM

## 2025-03-18 DIAGNOSIS — C18.7 ADENOCARCINOMA OF SIGMOID COLON (HCC): Primary | ICD-10-CM

## 2025-03-18 PROCEDURE — 99213 OFFICE O/P EST LOW 20 MIN: CPT | Performed by: INTERNAL MEDICINE

## 2025-03-18 RX ORDER — POTASSIUM CHLORIDE 1500 MG/1
20 TABLET, EXTENDED RELEASE ORAL DAILY
Qty: 14 TABLET | Refills: 0 | Status: SHIPPED | OUTPATIENT
Start: 2025-03-18 | End: 2025-04-01

## 2025-03-18 RX ORDER — CAPECITABINE 500 MG/1
TABLET, FILM COATED ORAL
COMMUNITY
Start: 2025-01-20

## 2025-03-18 NOTE — ASSESSMENT & PLAN NOTE
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  Initial pathologic diagnosis of moderately differentiated adenocarcinoma of the sigmoid colon with established at the time of colonoscopy and biopsy of obstructing mass involving the sigmoid colon on October 24, 2024.  The patient underwent R0 laparoscopic hand-assisted extended left hemicolectomy with mobilization of the splenic flexure of the colon and creation of a descending colostomy on October 29, 2024.  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  After careful consideration of different options for adjuvant chemotherapy for high risk stage IIB moderately differentiated adenocarcinoma of the sigmoid colon, MMR-P/DAO, I made a decision with the patient to proceed with adjuvant capecitabine 2000 mg/m²/day in 2 divided doses daily 1 week on 1 week of for 3 to 6 months (single agent capecitabine).  The patient will not receive adjuvant oxaliplatin.  Benefits of adjuvant capecitabine for high risk stage IIB adenocarcinoma of the sigmoid colon include prolongation of overall  survival, prolongation of relapse free survival.  Patient has a clear understanding of potential adverse events that may be associated with capecitabine including hand-foot syndrome.  Benefits of adjuvant capecitabine outweigh any potential treatment related toxicity. The patient underwent reversal of colostomy on December 31, 2024. He initiated adjuvant single agent capecitabine on January 20 2025.     INTERIM ASSESSMENT: Mr. Bullock is tolerating adjuvant capecitabine well, without treatment related adverse events or serious adverse events.  He refers mild fatigue.  Otherwise, he does not have new complaints.      Plan:  Continue adjuvant capecitabine.  Duration of adjuvant chemotherapy is 6 months.  Subsequently, the patient will initiate surveillance for stage IIB invasive moderately differentiated adenocarcinoma of the sigmoid colon.  Monthly CBC with differential, CMP  Periodic serum CEA  Return to medical oncology clinic for history and physical exam and for assessment of safety and tolerability of adjuvant capecitabine in mid June 2025

## 2025-03-18 NOTE — PROGRESS NOTES
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  Initial pathologic diagnosis of moderately differentiated adenocarcinoma of the sigmoid colon with established at the time of colonoscopy and biopsy of obstructing mass involving the sigmoid colon on 2024.  The patient underwent R0 laparoscopic hand-assisted extended left hemicolectomy with mobilization of the splenic flexure of the colon and creation of a descending colostomy on 2024.  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  After careful consideration of different options for adjuvant chemotherapy for high risk stage IIB moderately differentiated adenocarcinoma of the sigmoid colon, MMR-P/DAO, I made a decision with the patient to proceed with adjuvant capecitabine 2000 mg/m²/day in 2  divided doses daily 1 week on 1 week of for 3 to 6 months (single agent capecitabine).  The patient will not receive adjuvant oxaliplatin.  Benefits of adjuvant capecitabine for high risk stage IIB adenocarcinoma of the sigmoid colon include prolongation of overall survival, prolongation of relapse free survival.  Patient has a clear understanding of potential adverse events that may be associated with capecitabine including hand-foot syndrome.  Benefits of adjuvant capecitabine outweigh any potential treatment related toxicity. The patient underwent reversal of colostomy on December 31, 2024. He initiated adjuvant single agent capecitabine on January 20 2025.     INTERIM ASSESSMENT: Mr. Bullock is tolerating adjuvant capecitabine well, without treatment related adverse events or serious adverse events.  He refers mild fatigue.  Otherwise, he does not have new complaints.      Plan:  Continue adjuvant capecitabine.  Duration of adjuvant chemotherapy is 6 months.  Subsequently, the patient will initiate surveillance for stage IIB invasive moderately differentiated adenocarcinoma of the sigmoid colon.  Monthly CBC with differential, CMP  Periodic serum CEA  Return to medical oncology clinic for history and physical exam and for assessment of safety and tolerability of adjuvant capecitabine in mid June 2025    Hypokalemia  The patient has mild hypokalemia probably related to intermittent loose stools.  I advised him regarding a high potassium diet.  In addition, he will take potassium chloride 20 mill equivalents per mouth daily x 14 days.  I will monitor serum potassium levels periodically.      Mr. Bullock understands and agrees with my management recommendations and plan of care. I answered questions to satisfaction.        History of Present Illness   No chief complaint on file.  54 y.o. male with pathologic T4, pathologic N0 (0 of 21 lymph nodes) clinical M0, high risk stage IIB invasive moderately differentiated  adenocarcinoma of the sigmoid colon, mismatch repair protein proficient (MMR-P), consistent with microsatellite stable colon cancer, presenting with colonic obstruction.  Initial pathologic diagnosis of moderately differentiated adenocarcinoma of the sigmoid colon with established at the time of colonoscopy and biopsy of obstructing mass involving the sigmoid colon on October 24, 2024.  The patient underwent R0 laparoscopic hand-assisted extended left hemicolectomy with mobilization of the splenic flexure of the colon and creation of a descending colostomy on October 29, 2024.      In summary, Mr. Pena presented to the emergency department with acute colonic obstruction secondary to a mass present in the left sigmoid colon the third week of October 2024.  On October 21, 2024, contrast-enhanced CT scan of the abdomen and pelvis showed distended fluid-filled loops of small bowel.  On October 23, 2024, non contrast CT scan of the chest showed a partially imaged colonic obstruction in the upper abdomen. There were no findings of metastatic disease in the chest. 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 The same day, serum CEA was 13.4 ng/mL. On October 24, 2024, colonoscopy showed a single malignant-appearing, fungating, invasive and ulcerated mass (not traversable) in the sigmoid colon, covering the whole circumference; performed cold forceps biopsy. There was a malignant-appearing stricture (not traversable) in the sigmoid colon. Pathology showed intramucosal adenocarcinoma arising in a tubular adenoma with extensive high grade dysplasia.  On October 24, 2024, the patient underwent placement of a sigmoid colonic metallic stent for treatment of colonic obstruction.  On October 29, 2024, the patient underwent R0 laparoscopic hand-assisted extended left hemicolectomy with mobilization of the splenic flexure, and creation of a descending colostomy. Pathology showed moderately  differentiated adenocarcinoma (6.8 cm), arising in a tubular adenoma. Tumor invades the visceral peritoneum (pT4). Lymphovascular and perineural invasion are present. All margins are negative for tumor. Twenty one lymph nodes, negative for malignancy (0/21), pN0. The primary tumor is MMR-P consistent with a DAO colon cancer.    Interim History: Mr. Bullock is doing well. He refers mild occasional fatigue.  Otherwise, he does not have new systemic, gastrointestinal, genitourinary, cardiorespiratory, muscle skeletal, or neurologic symptoms.    Oncology History   Cancer Staging   Adenocarcinoma of sigmoid colon (HCC)  Staging form: Colon and Rectum, AJCC 8th Edition  - Pathologic stage from 11/18/2024: Stage IIB (pT4a, pN0, cM0) - Signed by Sarah Aguilar MD on 11/18/2024  Stage prefix: Initial diagnosis  Total positive nodes: 0  Total nodes examined: 21  Histologic grading system: 4 grade system  Histologic grade (G): G2  Residual tumor (R): R0 - None  Laterality: Left  Tumor size (mm): 68  Lymph-vascular invasion (LVI): LVI present/identified, NOS  Diagnostic confirmation: Positive histology  Specimen type: Excision  Staged by: Managing physician  Carcinoembryonic antigen (CEA) (ng/mL): 13.4  Perineural invasion (PNI): Present  Microsatellite instability (MSI): Stable  Stage used in treatment planning: Yes  National guidelines used in treatment planning: Yes  Oncology History   Adenocarcinoma of sigmoid colon (HCC)   11/18/2024 Initial Diagnosis    Adenocarcinoma of sigmoid colon (HCC)     11/18/2024 -  Cancer Staged    Staging form: Colon and Rectum, AJCC 8th Edition  - Pathologic stage from 11/18/2024: Stage IIB (pT4a, pN0, cM0) - Signed by Sarah Aguilar MD on 11/18/2024  Stage prefix: Initial diagnosis  Total positive nodes: 0  Total nodes examined: 21  Histologic grading system: 4 grade system  Histologic grade (G): G2  Residual tumor (R): R0 - None  Laterality: Left  Tumor size (mm): 68  Lymph-vascular invasion  (LVI): LVI present/identified, NOS  Diagnostic confirmation: Positive histology  Specimen type: Excision  Staged by: Managing physician  Carcinoembryonic antigen (CEA) (ng/mL): 13.4  Perineural invasion (PNI): Present  Microsatellite instability (MSI): Stable  Stage used in treatment planning: Yes  National guidelines used in treatment planning: Yes          Pertinent Medical History   Past Medical History:   Diagnosis Date    Cancer (HCC)     colon cancer    GERD (gastroesophageal reflux disease)         Review of Systems   Constitutional:  Positive for fatigue (Intermittent mild fatigue). Negative for activity change, appetite change, chills, diaphoresis, fever and unexpected weight change.   HENT:  Negative for congestion, dental problem, ear discharge, ear pain, facial swelling, hearing loss, mouth sores, nosebleeds, postnasal drip, rhinorrhea, sinus pain, sneezing, sore throat, tinnitus, trouble swallowing and voice change.    Eyes:  Negative for photophobia, pain, discharge, redness, itching and visual disturbance.   Respiratory:  Negative for apnea, cough, choking, chest tightness, shortness of breath, wheezing and stridor.    Cardiovascular:  Negative for chest pain, palpitations and leg swelling.   Gastrointestinal:  Negative for abdominal distention, abdominal pain, anal bleeding, blood in stool, constipation, diarrhea, nausea, rectal pain and vomiting.   Endocrine: Negative for cold intolerance and heat intolerance.   Genitourinary:  Negative for decreased urine volume, difficulty urinating, dysuria, enuresis, flank pain, frequency, hematuria and urgency.   Musculoskeletal:  Negative for arthralgias, back pain, gait problem, joint swelling, myalgias, neck pain and neck stiffness.   Skin:  Negative for color change, pallor, rash and wound.   Neurological:  Negative for dizziness, tremors, seizures, syncope, facial asymmetry, speech difficulty, weakness, light-headedness and numbness.   Hematological:   Negative for adenopathy. Does not bruise/bleed easily.   Psychiatric/Behavioral:  Negative for behavioral problems, confusion, dysphoric mood and sleep disturbance. The patient is not nervous/anxious.            Objective   There were no vitals taken for this visit.    Pain Screening:     ECOG   0  Physical Exam  Constitutional:       Comments:  male well-developed, well-nourished without acute respiratory distress   HENT:      Head: Normocephalic and atraumatic.      Nose: Nose normal.      Mouth/Throat:      Mouth: Mucous membranes are moist.      Pharynx: Oropharynx is clear.   Eyes:      Extraocular Movements: Extraocular movements intact.      Conjunctiva/sclera: Conjunctivae normal.      Pupils: Pupils are equal, round, and reactive to light.      Comments: No scleral icterus   Neck:      Comments: No cervical, supraclavicular, axillary, epitrochlear, or inguinal lymphadenopathy.   Cardiovascular:      Rate and Rhythm: Normal rate and regular rhythm.      Pulses: Normal pulses.      Heart sounds: Normal heart sounds.   Pulmonary:      Effort: Pulmonary effort is normal.      Breath sounds: Normal breath sounds.   Abdominal:      General: Bowel sounds are normal.      Palpations: Abdomen is soft.      Comments: Abdomen soft, nontender, nonpainful.  No hepatomegaly.  No splenomegaly.  No rebound tenderness.  No lateral or shifting dullness.  Bowel sounds present, normal.    Musculoskeletal:         General: Normal range of motion.      Cervical back: Normal range of motion and neck supple.   Skin:     General: Skin is warm and dry.      Capillary Refill: Capillary refill takes less than 2 seconds.   Neurological:      General: No focal deficit present.      Mental Status: He is alert and oriented to person, place, and time. Mental status is at baseline.   Psychiatric:         Mood and Affect: Mood normal.         Behavior: Behavior normal.         Thought Content: Thought content normal.          Judgment: Judgment normal.       Labs: I have reviewed the following labs:  Lab Results   Component Value Date/Time    WBC 10.24 (H) 03/16/2025 08:04 AM    RBC 4.27 03/16/2025 08:04 AM    Hemoglobin 13.7 03/16/2025 08:04 AM    Hematocrit 39.4 03/16/2025 08:04 AM    MCV 92 03/16/2025 08:04 AM    MCH 32.1 03/16/2025 08:04 AM    RDW 17.7 (H) 03/16/2025 08:04 AM    Platelets 254 03/16/2025 08:04 AM    Segmented % 72 03/16/2025 08:04 AM    Lymphocytes % 14 03/16/2025 08:04 AM    Monocytes % 11 03/16/2025 08:04 AM    Eosinophils Relative 2 03/16/2025 08:04 AM    Basophils Relative 1 03/16/2025 08:04 AM    Immature Grans % 0 03/16/2025 08:04 AM    Absolute Neutrophils 7.27 03/16/2025 08:04 AM     Lab Results   Component Value Date/Time    Potassium 3.1 (L) 03/16/2025 08:04 AM    Chloride 109 (H) 03/16/2025 08:04 AM    CO2 25 03/16/2025 08:04 AM    BUN 21 03/16/2025 08:04 AM    Creatinine 1.19 03/16/2025 08:04 AM    Glucose, Fasting 88 03/16/2025 08:04 AM    Calcium 8.9 03/16/2025 08:04 AM    AST 17 03/16/2025 08:04 AM    ALT 17 03/16/2025 08:04 AM    Alkaline Phosphatase 64 03/16/2025 08:04 AM    Total Protein 7.0 03/16/2025 08:04 AM    Albumin 4.1 03/16/2025 08:04 AM    Total Bilirubin 0.75 03/16/2025 08:04 AM    eGFR 67 03/16/2025 08:04 AM     Serum CEA Levels:    Component      Latest Ref Rng 10/23/2024 1/27/2025 3/16/2025   CARCINOEMBRYONIC ANTIGEN      0.0 - 3.0 ng/mL 13.4 (H)  1.9  2.0

## 2025-05-09 ENCOUNTER — TELEPHONE (OUTPATIENT)
Age: 55
End: 2025-05-09

## 2025-06-02 ENCOUNTER — TELEPHONE (OUTPATIENT)
Age: 55
End: 2025-06-02

## 2025-06-04 NOTE — PROGRESS NOTES
Name: Darin Bullock      : 1970      MRN: [id number]  Encounter Provider: Sarah Aguilar MD  Encounter Date: 6/10/2025   Encounter department: [facility] [place]  :  Assessment & Plan  Adenocarcinoma of sigmoid colon (HCC)    Mr. 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 He is s/p left hemicolectomy on 10/29/24 and Will colostomy on 24.  Final pathology was notable for Grade 2 histology with invasion of visceral peritoneum (pT4); both LVI and PNI were present. All margins and all involved lymph nodes (21 total lymph nodes) were negative for tumor involvement. Due to the high risk feature such as presentation with clinical obstruction, presence of LVI/PNI, patient was recommended adjuvant treatment with chemotherapy in the form of capecitabine. Adjuvant capecitabine (2000 mg/m2 total daily dose divided in BID dosing, 1 week on, 1 week off) was started on 25.    Patient has been tolerating adjuvant capecitabine fairly well, except for mild degree of neuropathy involving 2 of his right hand finger tips and intermittent episodes of nausea. Patient is expected to complete total 6 months of adjuvant capecitabine on 25, after which patient will be on active surveillance. This will be consisting of H/P and bloodwork (including CEA as baseline CEA was elevated at 13.4) every 3-6 months x 2 years, then every 6 months for a total of 5 years. CT CAP every 6-12 months for a total of 5 years, starting around end of 2025. Patient will also need a repeat colonoscopy around , which will be an year out from his initial. Patient was recommended to reach out to GI team to schedule the colonoscopy. Future frequency of colonoscopy to be determined pending findings on next  colonoscopy.     Patient and his significant other were in agreement with the plan as noted above. Patient knows to call the office with any questions or concerns.     Orders:    CEA; Future    CBC and differential; Future    Comprehensive metabolic panel; Future    CT chest abdomen pelvis w contrast; Future    CBC and differential; Future    Comprehensive metabolic panel; Future    Summary of recommendations:   Continue adjuvant capecitabine for a total of 6 months. Anticipated end date is 6/29/25.   After total 6 months of adjuvant capecitabine, patient will be on active surveillance  H/P and CEA every 3-6 months x 2 years, then every 6 months for a total of 5 years   Follow-up in office in 3 months with repeat bloodwork and CT CAP  Colonoscopy to be scheduled around December/January    History of Present Illness     Oncology History   Adenocarcinoma of sigmoid colon (HCC)   11/18/2024 Initial Diagnosis    Adenocarcinoma of sigmoid colon (HCC)     11/18/2024 -  Cancer Staged    Staging form: Colon and Rectum, AJCC 8th Edition  - Pathologic stage from 11/18/2024: Stage IIB (pT4a, pN0, cM0) - Signed by Sarah Aguilar MD on 11/18/2024  Stage prefix: Initial diagnosis  Total positive nodes: 0  Total nodes examined: 21  Histologic grading system: 4 grade system  Histologic grade (G): G2  Residual tumor (R): R0 - None  Laterality: Left  Tumor size (mm): 68  Lymph-vascular invasion (LVI): LVI present/identified, NOS  Diagnostic confirmation: Positive histology  Specimen type: Excision  Staged by: Managing physician  Carcinoembryonic antigen (CEA) (ng/mL): 13.4  Perineural invasion (PNI): Present  Microsatellite instability (MSI): Stable  Stage used in treatment planning: Yes  National guidelines used in treatment planning: Yes         Interval History:     Mr. Bullock is a 55 year old male who is currently on adjuvant capecitabine for stage IIB colon cancer with high risk features. He has 2 more weeks of active treatment  "left (this week) and the week of 6/23/25. Other than mild degree of numbness involving 2 fingertips in the right finger and intermittent episodes of nausea during days of active treatment, patient denied any other acute complaints. He is eating fairly well, staying active, and weight has been stable.     Review of Systems   Constitutional:  Negative for activity change, appetite change, fatigue, fever and unexpected weight change.   HENT:  Negative for mouth sores, nosebleeds, sore throat, trouble swallowing and voice change.    Eyes:  Negative for pain, redness and visual disturbance.   Respiratory:  Negative for cough, chest tightness, shortness of breath and wheezing.    Cardiovascular:  Negative for chest pain, palpitations and leg swelling.   Gastrointestinal:  Positive for nausea (intermittent episodes of nausea). Negative for abdominal pain, blood in stool, constipation, diarrhea and vomiting.   Genitourinary:  Negative for difficulty urinating, flank pain, frequency and hematuria.   Musculoskeletal:  Negative for arthralgias, back pain and gait problem.   Skin:  Negative for color change, pallor, rash and wound.   Neurological:  Positive for numbness (mild degree of numbness involving 2 finger tips on right hand). Negative for dizziness, tremors, facial asymmetry, weakness and headaches.   Hematological:  Negative for adenopathy. Does not bruise/bleed easily.   Psychiatric/Behavioral:  Negative for agitation, confusion and hallucinations. The patient is not nervous/anxious.           Objective   /100 (BP Location: Left arm, Patient Position: Sitting, Cuff Size: Adult)   Pulse 84   Temp (!) 97.4 °F (36.3 °C) (Temporal)   Resp 18   Ht 5' 10\" (1.778 m)   Wt 89.4 kg (197 lb)   SpO2 99%   BMI 28.27 kg/m²      Physical Exam  Vitals reviewed.   Constitutional:       General: He is not in acute distress.     Appearance: He is not toxic-appearing.   HENT:      Head: Normocephalic and atraumatic.      " Mouth/Throat:      Mouth: Mucous membranes are moist.      Pharynx: No oropharyngeal exudate or posterior oropharyngeal erythema.     Eyes:      General: No scleral icterus.     Extraocular Movements: Extraocular movements intact.      Conjunctiva/sclera: Conjunctivae normal.       Cardiovascular:      Rate and Rhythm: Normal rate and regular rhythm.      Pulses: Normal pulses.      Heart sounds: Normal heart sounds. No murmur heard.     No gallop.   Pulmonary:      Effort: Pulmonary effort is normal. No respiratory distress.      Breath sounds: Normal breath sounds. No wheezing or rales.   Abdominal:      General: There is no distension.      Palpations: Abdomen is soft.      Tenderness: There is no abdominal tenderness.     Musculoskeletal:         General: Normal range of motion.      Cervical back: Normal range of motion. No rigidity.      Right lower leg: No edema.      Left lower leg: No edema.   Lymphadenopathy:      Cervical: No cervical adenopathy.     Skin:     Capillary Refill: Capillary refill takes less than 2 seconds.      Findings: No lesion or rash.     Neurological:      General: No focal deficit present.      Mental Status: He is alert and oriented to person, place, and time.     Psychiatric:         Mood and Affect: Mood normal.         Behavior: Behavior normal.

## 2025-06-04 NOTE — ASSESSMENT & PLAN NOTE
Mr. 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 He is s/p left hemicolectomy on 10/29/24 and Will colostomy on 12/31/24.  Final pathology was notable for Grade 2 histology with invasion of visceral peritoneum (pT4); both LVI and PNI were present. All margins and all involved lymph nodes (21 total lymph nodes) were negative for tumor involvement. Due to the high risk feature such as presentation with clinical obstruction, presence of LVI/PNI, patient was recommended adjuvant treatment with chemotherapy in the form of capecitabine. Adjuvant capecitabine (2000 mg/m2 total daily dose divided in BID dosing, 1 week on, 1 week off) was started on 1/20/25.    Patient has been tolerating adjuvant capecitabine fairly well, except for mild degree of neuropathy involving 2 of his right hand finger tips and intermittent episodes of nausea. Patient is expected to complete total 6 months of adjuvant capecitabine on 6/29/25, after which patient will be on active surveillance. This will be consisting of H/P and bloodwork (including CEA as baseline CEA was elevated at 13.4) every 3-6 months x 2 years, then every 6 months for a total of 5 years. CT CAP every 6-12 months for a total of 5 years, starting around end of September 2025. Patient will also need a repeat colonoscopy around December/January, which will be an year out from his initial. Patient was recommended to reach out to GI team to schedule the colonoscopy. Future frequency of colonoscopy to be determined pending findings on next colonoscopy.     Patient and his significant other were in agreement with the plan as noted above. Patient knows to call the office with any questions or concerns.     Orders:    CEA; Future    CBC and differential; Future    Comprehensive metabolic panel; Future    CT  chest abdomen pelvis w contrast; Future    CBC and differential; Future    Comprehensive metabolic panel; Future

## 2025-06-05 DIAGNOSIS — C18.9 MALIGNANT NEOPLASM OF COLON, UNSPECIFIED PART OF COLON (HCC): Primary | ICD-10-CM

## 2025-06-05 RX ORDER — CAPECITABINE 500 MG/1
TABLET, FILM COATED ORAL
Qty: 112 TABLET | Refills: 5 | Status: SHIPPED | OUTPATIENT
Start: 2025-06-05

## 2025-06-08 ENCOUNTER — APPOINTMENT (OUTPATIENT)
Dept: LAB | Facility: CLINIC | Age: 55
End: 2025-06-08
Attending: INTERNAL MEDICINE
Payer: COMMERCIAL

## 2025-06-08 DIAGNOSIS — E87.6 HYPOKALEMIA: ICD-10-CM

## 2025-06-08 DIAGNOSIS — C18.7 ADENOCARCINOMA OF SIGMOID COLON (HCC): ICD-10-CM

## 2025-06-08 LAB
ALBUMIN SERPL BCG-MCNC: 4.3 G/DL (ref 3.5–5)
ALP SERPL-CCNC: 59 U/L (ref 34–104)
ALT SERPL W P-5'-P-CCNC: 22 U/L (ref 7–52)
ANION GAP SERPL CALCULATED.3IONS-SCNC: 10 MMOL/L (ref 4–13)
AST SERPL W P-5'-P-CCNC: 20 U/L (ref 13–39)
BASOPHILS # BLD AUTO: 0.04 THOUSANDS/ÂΜL (ref 0–0.1)
BASOPHILS NFR BLD AUTO: 1 % (ref 0–1)
BILIRUB SERPL-MCNC: 0.69 MG/DL (ref 0.2–1)
BUN SERPL-MCNC: 20 MG/DL (ref 5–25)
CALCIUM SERPL-MCNC: 8.8 MG/DL (ref 8.4–10.2)
CEA SERPL-MCNC: 2.6 NG/ML (ref 0–3)
CHLORIDE SERPL-SCNC: 108 MMOL/L (ref 96–108)
CO2 SERPL-SCNC: 23 MMOL/L (ref 21–32)
CREAT SERPL-MCNC: 1.25 MG/DL (ref 0.6–1.3)
EOSINOPHIL # BLD AUTO: 0.3 THOUSAND/ÂΜL (ref 0–0.61)
EOSINOPHIL NFR BLD AUTO: 5 % (ref 0–6)
ERYTHROCYTE [DISTWIDTH] IN BLOOD BY AUTOMATED COUNT: 16.2 % (ref 11.6–15.1)
GFR SERPL CREATININE-BSD FRML MDRD: 63 ML/MIN/1.73SQ M
GLUCOSE P FAST SERPL-MCNC: 100 MG/DL (ref 65–99)
HCT VFR BLD AUTO: 39.9 % (ref 36.5–49.3)
HGB BLD-MCNC: 14 G/DL (ref 12–17)
IMM GRANULOCYTES # BLD AUTO: 0.01 THOUSAND/UL (ref 0–0.2)
IMM GRANULOCYTES NFR BLD AUTO: 0 % (ref 0–2)
LYMPHOCYTES # BLD AUTO: 1.33 THOUSANDS/ÂΜL (ref 0.6–4.47)
LYMPHOCYTES NFR BLD AUTO: 23 % (ref 14–44)
MCH RBC QN AUTO: 34.3 PG (ref 26.8–34.3)
MCHC RBC AUTO-ENTMCNC: 35.1 G/DL (ref 31.4–37.4)
MCV RBC AUTO: 98 FL (ref 82–98)
MONOCYTES # BLD AUTO: 0.88 THOUSAND/ÂΜL (ref 0.17–1.22)
MONOCYTES NFR BLD AUTO: 15 % (ref 4–12)
NEUTROPHILS # BLD AUTO: 3.19 THOUSANDS/ÂΜL (ref 1.85–7.62)
NEUTS SEG NFR BLD AUTO: 56 % (ref 43–75)
NRBC BLD AUTO-RTO: 0 /100 WBCS
PLATELET # BLD AUTO: 234 THOUSANDS/UL (ref 149–390)
PMV BLD AUTO: 9.1 FL (ref 8.9–12.7)
POTASSIUM SERPL-SCNC: 3.5 MMOL/L (ref 3.5–5.3)
PROT SERPL-MCNC: 6.9 G/DL (ref 6.4–8.4)
RBC # BLD AUTO: 4.08 MILLION/UL (ref 3.88–5.62)
SODIUM SERPL-SCNC: 141 MMOL/L (ref 135–147)
WBC # BLD AUTO: 5.75 THOUSAND/UL (ref 4.31–10.16)

## 2025-06-08 PROCEDURE — 85025 COMPLETE CBC W/AUTO DIFF WBC: CPT

## 2025-06-08 PROCEDURE — 80053 COMPREHEN METABOLIC PANEL: CPT

## 2025-06-08 PROCEDURE — 36415 COLL VENOUS BLD VENIPUNCTURE: CPT

## 2025-06-08 PROCEDURE — 82378 CARCINOEMBRYONIC ANTIGEN: CPT

## 2025-06-10 ENCOUNTER — OFFICE VISIT (OUTPATIENT)
Dept: HEMATOLOGY ONCOLOGY | Facility: CLINIC | Age: 55
End: 2025-06-10
Payer: COMMERCIAL

## 2025-06-10 VITALS
RESPIRATION RATE: 18 BRPM | BODY MASS INDEX: 28.2 KG/M2 | SYSTOLIC BLOOD PRESSURE: 140 MMHG | WEIGHT: 197 LBS | OXYGEN SATURATION: 99 % | DIASTOLIC BLOOD PRESSURE: 100 MMHG | HEART RATE: 84 BPM | HEIGHT: 70 IN | TEMPERATURE: 97.4 F

## 2025-06-10 DIAGNOSIS — C18.7 ADENOCARCINOMA OF SIGMOID COLON (HCC): Primary | ICD-10-CM

## 2025-06-10 PROCEDURE — 99213 OFFICE O/P EST LOW 20 MIN: CPT | Performed by: INTERNAL MEDICINE

## 2025-06-10 NOTE — PATIENT INSTRUCTIONS
Please get bloodwork (CBC and CMP) the week after your last week of chemo. Please follow-up with Dr. Frye in 3 months with repeat bloodwork and CT scan prior.

## 2025-06-10 NOTE — PROGRESS NOTES
HEMATOLOGY ONCOLOGY STAFF PHYSICIAN ATTESTATION   I have personally interviewed and examined Darin Bullock with Dr. Antonio Dickson. I have reviewed and discussed the HPI, assessment, and oncologic management plan formulated by Dr. Dickson. I concur with her assessment and management plan.    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  Initial pathologic diagnosis of moderately differentiated adenocarcinoma of the sigmoid colon with established at the time of colonoscopy and biopsy of obstructing mass involving the sigmoid colon on October 24, 2024.  The patient underwent R0 laparoscopic hand-assisted extended left hemicolectomy with mobilization of the splenic flexure of the colon and creation of a descending colostomy on October 29, 2024.  The patient has multiple pathologic and clinical parameters consistent with high risk pathologic stage IIB moderately differentiated adenocarcinoma of the sigmoid colon      INTERIM ASSESSMENT: Mr. Bullock is tolerating adjuvant capecitabine well, without treatment related adverse events or serious adverse events.  He refers mild fatigue.  Otherwise, he does not have new complaints.      Plan:  Continue adjuvant capecitabine.  Duration of adjuvant chemotherapy is 6 months.  The patient will complete adjuvant capecitabine in late June 2025   Initiate surveillance for stage IIB invasive moderately differentiated adenocarcinoma of the sigmoid colon in July 2025 as per NCCN Guidelines   Periodic serum CEA  Contrast-enhanced CT scan of the chest, abdomen and pelvis in late September 2025  Surveillance colonoscopy around December 2025  Return to medical oncology clinic for history and physical exam in early October  2025    Mr. Bullock understands and agrees with our management recommendations and plan of care. We answered questions to his satisfaction.      Sarah Aguilar MD

## 2025-07-03 ENCOUNTER — APPOINTMENT (OUTPATIENT)
Dept: LAB | Facility: CLINIC | Age: 55
End: 2025-07-03
Attending: INTERNAL MEDICINE
Payer: COMMERCIAL

## 2025-07-03 DIAGNOSIS — C18.7 ADENOCARCINOMA OF SIGMOID COLON (HCC): ICD-10-CM

## 2025-07-03 LAB
ALBUMIN SERPL BCG-MCNC: 4.3 G/DL (ref 3.5–5)
ALP SERPL-CCNC: 50 U/L (ref 34–104)
ALT SERPL W P-5'-P-CCNC: 17 U/L (ref 7–52)
ANION GAP SERPL CALCULATED.3IONS-SCNC: 7 MMOL/L (ref 4–13)
AST SERPL W P-5'-P-CCNC: 17 U/L (ref 13–39)
BASOPHILS # BLD AUTO: 0.08 THOUSANDS/ÂΜL (ref 0–0.1)
BASOPHILS NFR BLD AUTO: 1 % (ref 0–1)
BILIRUB SERPL-MCNC: 0.66 MG/DL (ref 0.2–1)
BUN SERPL-MCNC: 20 MG/DL (ref 5–25)
CALCIUM SERPL-MCNC: 9.1 MG/DL (ref 8.4–10.2)
CHLORIDE SERPL-SCNC: 108 MMOL/L (ref 96–108)
CO2 SERPL-SCNC: 25 MMOL/L (ref 21–32)
CREAT SERPL-MCNC: 1.33 MG/DL (ref 0.6–1.3)
EOSINOPHIL # BLD AUTO: 0.38 THOUSAND/ÂΜL (ref 0–0.61)
EOSINOPHIL NFR BLD AUTO: 6 % (ref 0–6)
ERYTHROCYTE [DISTWIDTH] IN BLOOD BY AUTOMATED COUNT: 15.5 % (ref 11.6–15.1)
GFR SERPL CREATININE-BSD FRML MDRD: 58 ML/MIN/1.73SQ M
GLUCOSE P FAST SERPL-MCNC: 104 MG/DL (ref 65–99)
HCT VFR BLD AUTO: 39.5 % (ref 36.5–49.3)
HGB BLD-MCNC: 13.4 G/DL (ref 12–17)
IMM GRANULOCYTES # BLD AUTO: 0.04 THOUSAND/UL (ref 0–0.2)
IMM GRANULOCYTES NFR BLD AUTO: 1 % (ref 0–2)
LYMPHOCYTES # BLD AUTO: 1.67 THOUSANDS/ÂΜL (ref 0.6–4.47)
LYMPHOCYTES NFR BLD AUTO: 25 % (ref 14–44)
MCH RBC QN AUTO: 33.3 PG (ref 26.8–34.3)
MCHC RBC AUTO-ENTMCNC: 33.9 G/DL (ref 31.4–37.4)
MCV RBC AUTO: 98 FL (ref 82–98)
MONOCYTES # BLD AUTO: 0.87 THOUSAND/ÂΜL (ref 0.17–1.22)
MONOCYTES NFR BLD AUTO: 13 % (ref 4–12)
NEUTROPHILS # BLD AUTO: 3.74 THOUSANDS/ÂΜL (ref 1.85–7.62)
NEUTS SEG NFR BLD AUTO: 54 % (ref 43–75)
NRBC BLD AUTO-RTO: 0 /100 WBCS
PLATELET # BLD AUTO: 264 THOUSANDS/UL (ref 149–390)
PMV BLD AUTO: 8.9 FL (ref 8.9–12.7)
POTASSIUM SERPL-SCNC: 3 MMOL/L (ref 3.5–5.3)
PROT SERPL-MCNC: 6.9 G/DL (ref 6.4–8.4)
RBC # BLD AUTO: 4.02 MILLION/UL (ref 3.88–5.62)
SODIUM SERPL-SCNC: 140 MMOL/L (ref 135–147)
WBC # BLD AUTO: 6.78 THOUSAND/UL (ref 4.31–10.16)

## 2025-07-03 PROCEDURE — 80053 COMPREHEN METABOLIC PANEL: CPT

## 2025-07-03 PROCEDURE — 85025 COMPLETE CBC W/AUTO DIFF WBC: CPT

## 2025-07-03 PROCEDURE — 36415 COLL VENOUS BLD VENIPUNCTURE: CPT

## (undated) DEVICE — 40595 XL TRENDELENBURG POSITIONING KIT: Brand: 40595 XL TRENDELENBURG POSITIONING KIT

## (undated) DEVICE — MEDI-VAC NON-CONDUCTIVE SUCTION TUBING 6MM X 1.8M (6FT.) L: Brand: CARDINAL HEALTH

## (undated) DEVICE — CO2 AND WATER TUBING/CAP SET FOR OLYMPUS® SCOPES & UCR: Brand: ERBE

## (undated) DEVICE — ELECTRODE EZ CLEAN BLADE -0012

## (undated) DEVICE — CATH FOLEY 18FR 5ML 2WAY LUBRICATH

## (undated) DEVICE — CATH URETHERAL CONNECTOR

## (undated) DEVICE — EXOFIN PRECISION PEN HIGH VISCOSITY TOPICAL SKIN ADHESIVE: Brand: EXOFIN PRECISION PEN, 1G

## (undated) DEVICE — VISUALIZATION SYSTEM: Brand: CLEARIFY

## (undated) DEVICE — Device: Brand: DEFENDO AIR/WATER/SUCTION AND BIOPSY VALVE

## (undated) DEVICE — PACK PBDS STERILE LAP LITHOTOMY RF

## (undated) DEVICE — TUBING SUCTION 5MM X 12 FT

## (undated) DEVICE — PREMIUM DRY TRAY LF: Brand: MEDLINE INDUSTRIES, INC.

## (undated) DEVICE — SUT PROLENE 2-0 KS 30 IN 8623H

## (undated) DEVICE — CATH SECURE FOLEY

## (undated) DEVICE — FIRST STEP BEDSIDE KIT - STAND-UP POUCH, ENDOSCOPIC CLEANING PAD - 1 POUCH: Brand: FIRST STEP BEDSIDE KIT - STAND-UP POUCH, ENDOSCOPIC CLEANING PAD

## (undated) DEVICE — PAD GROUNDING DUAL ADULT

## (undated) DEVICE — GLOVE SRG BIOGEL 7.5

## (undated) DEVICE — CATH URETERAL 5FR X 70 CM FLEX TIP POLYUR BARD

## (undated) DEVICE — 3M™ IOBAN™ 2 ANTIMICROBIAL INCISE DRAPE 6650EZ: Brand: IOBAN™ 2

## (undated) DEVICE — INSUFLATION TUBING INSUFLOW (LEXION)

## (undated) DEVICE — ECHELON CIRCULAR POWERED STAPLER: Brand: ECHELON CIRCULAR

## (undated) DEVICE — ACCESS PLATFORM FOR MINIMALLY INVASIVE SURGERY.: Brand: GELPORT® LAPAROSCOPIC  SYSTEM

## (undated) DEVICE — ANTIBACTERIAL UNDYED BRAIDED (POLYGLACTIN 910), SYNTHETIC ABSORBABLE SUTURE: Brand: COATED VICRYL

## (undated) DEVICE — INVIEW CLEAR LEGGINGS: Brand: CONVERTORS

## (undated) DEVICE — ECHELON FLEX 60 ARTICULATING ENDOSCOPIC LINEAR CUTTER (NO CARTRIDGE): Brand: ECHELON FLEX ENDOPATH

## (undated) DEVICE — GAUZE SPONGES,16 PLY: Brand: CURITY

## (undated) DEVICE — TRAY FOLEY 16FR URIMETER SILICONE SURESTEP

## (undated) DEVICE — 2, DISPOSABLE SUCTION/IRRIGATOR WITHOUT DISPOSABLE TIP: Brand: STRYKEFLOW

## (undated) DEVICE — ENSEAL X1 TISSUE SEALER, CURVED JAW, 37 CM SHAFT LENGTH: Brand: ENSEAL

## (undated) DEVICE — TOWEL SET X-RAY

## (undated) DEVICE — SUT MONOCRYL 4-0 PS-2 18 IN Y496G

## (undated) DEVICE — TROCAR: Brand: KII FIOS FIRST ENTRY

## (undated) DEVICE — INTENDED FOR TISSUE SEPARATION, AND OTHER PROCEDURES THAT REQUIRE A SHARP SURGICAL BLADE TO PUNCTURE OR CUT.: Brand: BARD-PARKER SAFETY BLADES SIZE 10, STERILE

## (undated) DEVICE — STERILE CYSTO PACK: Brand: CARDINAL HEALTH

## (undated) DEVICE — GLOVE INDICATOR PI UNDERGLOVE SZ 8 BLUE

## (undated) DEVICE — LAPAROSCOPIC ACCESS SYSTEM: Brand: ALEXIS LAPAROSCOPIC SYSTEM

## (undated) DEVICE — SUT VICRYL 0 REEL 54 IN J287G

## (undated) DEVICE — SUT PDS PLUS 1 CTX 36IN PDP371T

## (undated) DEVICE — DRAPE SHEET THREE QUARTER

## (undated) DEVICE — TROCAR: Brand: KII SLEEVE

## (undated) DEVICE — BAG URINE DRAINAGE URIMETER 350ML LF

## (undated) DEVICE — PLUMEPEN PRO 10FT

## (undated) DEVICE — ENDOPATH ECHELON ENDOSCOPIC LINEAR CUTTER RELOADS, BLUE, 60MM: Brand: ECHELON ENDOPATH

## (undated) DEVICE — GLOVE SRG BIOGEL ECLIPSE 7

## (undated) DEVICE — POOLE SUCTION HANDLE: Brand: CARDINAL HEALTH

## (undated) DEVICE — UNDER BUTTOCKS DRAPE W/FLUID CONTROL POUCH: Brand: CONVERTORS

## (undated) DEVICE — CHLORHEXIDINE 4PCT 4 OZ

## (undated) DEVICE — SUT VICRYL PLUS 0 UR-6 27IN VCP603H

## (undated) DEVICE — TRAVELKIT CONTAINS FIRST STEP KIT (200ML EP-4 KIT) AND SOILED SCOPE BAG - 1 KIT: Brand: TRAVELKIT CONTAINS FIRST STEP KIT AND SOILED SCOPE BAG

## (undated) DEVICE — INTENDED FOR TISSUE SEPARATION, AND OTHER PROCEDURES THAT REQUIRE A SHARP SURGICAL BLADE TO PUNCTURE OR CUT.: Brand: BARD-PARKER SAFETY BLADES SIZE 11, STERILE

## (undated) DEVICE — ADHESIVE SKIN HIGH VISCOSITY EXOFIN 1ML

## (undated) DEVICE — ENDOPATH ECHELON ENDOSCOPIC LINEAR CUTTER RELOADS, GREEN, 60MM: Brand: ECHELON ENDOPATH